# Patient Record
Sex: FEMALE | Race: WHITE | NOT HISPANIC OR LATINO | Employment: FULL TIME | URBAN - METROPOLITAN AREA
[De-identification: names, ages, dates, MRNs, and addresses within clinical notes are randomized per-mention and may not be internally consistent; named-entity substitution may affect disease eponyms.]

---

## 2017-01-23 ENCOUNTER — LAB VISIT (OUTPATIENT)
Dept: LAB | Facility: HOSPITAL | Age: 47
End: 2017-01-23
Payer: COMMERCIAL

## 2017-01-23 ENCOUNTER — OFFICE VISIT (OUTPATIENT)
Dept: FAMILY MEDICINE | Facility: CLINIC | Age: 47
End: 2017-01-23
Payer: COMMERCIAL

## 2017-01-23 VITALS
HEART RATE: 68 BPM | SYSTOLIC BLOOD PRESSURE: 140 MMHG | BODY MASS INDEX: 42.44 KG/M2 | DIASTOLIC BLOOD PRESSURE: 86 MMHG | HEIGHT: 62 IN | OXYGEN SATURATION: 99 % | WEIGHT: 230.63 LBS

## 2017-01-23 DIAGNOSIS — R22.1 NECK MASS: Primary | ICD-10-CM

## 2017-01-23 DIAGNOSIS — R22.1 NECK MASS: ICD-10-CM

## 2017-01-23 LAB
BASOPHILS # BLD AUTO: 0.04 K/UL
BASOPHILS NFR BLD: 0.6 %
DIFFERENTIAL METHOD: NORMAL
EOSINOPHIL # BLD AUTO: 0.2 K/UL
EOSINOPHIL NFR BLD: 3.2 %
ERYTHROCYTE [DISTWIDTH] IN BLOOD BY AUTOMATED COUNT: 12.9 %
HCT VFR BLD AUTO: 40.6 %
HGB BLD-MCNC: 13.6 G/DL
LYMPHOCYTES # BLD AUTO: 1.7 K/UL
LYMPHOCYTES NFR BLD: 25.1 %
MCH RBC QN AUTO: 28.3 PG
MCHC RBC AUTO-ENTMCNC: 33.5 %
MCV RBC AUTO: 84 FL
MONOCYTES # BLD AUTO: 0.5 K/UL
MONOCYTES NFR BLD: 7.6 %
NEUTROPHILS # BLD AUTO: 4.3 K/UL
NEUTROPHILS NFR BLD: 63.4 %
PLATELET # BLD AUTO: 220 K/UL
PMV BLD AUTO: 12.5 FL
RBC # BLD AUTO: 4.81 M/UL
TSH SERPL DL<=0.005 MIU/L-ACNC: 0.8 UIU/ML
WBC # BLD AUTO: 6.8 K/UL

## 2017-01-23 PROCEDURE — 84443 ASSAY THYROID STIM HORMONE: CPT

## 2017-01-23 PROCEDURE — 36415 COLL VENOUS BLD VENIPUNCTURE: CPT | Mod: PO

## 2017-01-23 PROCEDURE — 99213 OFFICE O/P EST LOW 20 MIN: CPT | Mod: S$GLB,,, | Performed by: NURSE PRACTITIONER

## 2017-01-23 PROCEDURE — 99999 PR PBB SHADOW E&M-EST. PATIENT-LVL IV: CPT | Mod: PBBFAC,,, | Performed by: NURSE PRACTITIONER

## 2017-01-23 PROCEDURE — 1159F MED LIST DOCD IN RCRD: CPT | Mod: S$GLB,,, | Performed by: NURSE PRACTITIONER

## 2017-01-23 PROCEDURE — 85025 COMPLETE CBC W/AUTO DIFF WBC: CPT

## 2017-01-23 NOTE — PROGRESS NOTES
Subjective:       Patient ID: Felicia Drew is a 46 y.o. female.    Chief Complaint: Mass (R side of neck) and Establish Care    HPI Comments: Patient has had intermittent mass on the right side of her neck, the last 3 months it has been more constant,  she feels it is getting larger, it is mildly tender.   She is under a lot a stress. She has not had any sinus congestion, fever, or weight changes.  She has been sleeping on a very hard mattress and has noticed some neck muscle pain.  Has not taken anything for symptoms.  TETANUS VACCINE due on 1987  Pap Smear due on 1990  Mammogram due on 2009  Influenza Vaccine due on 2016    Past Medical History:  Past Medical History:    Morbid obesity                                                Varicose veins                                              Past Surgical History:    LAPAROSCOPIC GASTRIC BANDING                                    SECTION                                               CHOLECYSTECTOMY                                                HYSTERECTOMY                                                   TONSILLECTOMY                                                  ABDOMINAL SURGERY                                              VASCULAR SURGERY                                               ZACK FILTER PLACEMENT                                    GASTRIC BYPASS                                                 lap band removal                                             Review of patient's allergies indicates:   -- Erythromycin -- Rash   -- Erythromycin (bulk) -- Rash  Current Outpatient Prescriptions on File Prior to Visit:  acetaminophen (TYLENOL) 500 MG tablet, Take 1 tablet (500 mg total) by mouth every 6 (six) hours as needed for Pain., Disp: , Rfl: 0  ammonium lactate 12 % Crea, daily as needed. , Disp: , Rfl: 5  B complex with C#10-folic acid 900 mcg/5 mL Liqd, Take 1 oz by mouth once daily., Disp: , Rfl:    CALCIUM CITRATE/VITAMIN D3 (CALCIUM CITRATE + D ORAL), Take by mouth 3 (three) times daily. Pt takes 1000mg tid, Disp: , Rfl:   cyanocobalamin, vitamin B-12, 2,500 mcg Subl, Place 1 tablet under the tongue once a week., Disp: , Rfl:   docusate sodium (COLACE) 100 MG capsule, Take 1 capsule (100 mg total) by mouth 2 (two) times daily., Disp: , Rfl: 0  ergocalciferol (VITAMIN D2) 50,000 unit Cap, Take 1 capsule (50,000 Units total) by mouth every 7 days., Disp: 24 capsule, Rfl: 0  metronidazole (FLAGYL) 250 MG tablet, 1 tablet 4 times daily for C diff infection, Disp: 40 tablet, Rfl: 0  omeprazole (PRILOSEC) 40 MG capsule, Take 1 capsule (40 mg total) by mouth 2 (two) times daily before meals., Disp: 180 capsule, Rfl: 1  omeprazole (PRILOSEC) 40 MG capsule, Take 1 capsule (40 mg total) by mouth 2 (two) times daily before meals., Disp: 180 capsule, Rfl: 5  ondansetron (ZOFRAN-ODT) 4 MG TbDL, TAKE 2 TABLETS BY MOUTH EVERY 6 HOURS IF NEEDED FOR NAUSEA, Disp: 60 tablet, Rfl: 1  pediatric multivitamin chewable tablet, Take 1 tablet by mouth 2 (two) times daily., Disp: , Rfl:   polyethylene glycol (GLYCOLAX) 17 gram/dose powder, Take 17 g by mouth once daily., Disp: 1 Bottle, Rfl: 0    No current facility-administered medications on file prior to visit.     Social History    Marital status:              Spouse name:                       Years of education:                 Number of children:               Occupational History    None on file    Social History Main Topics    Smoking status: Former Smoker                                                                Packs/day: 0.00      Years: 0.00           Start date: 9/9/2009    Smokeless status: Never Used                        Alcohol use: Yes                Comment: socially    Drug use: No              Sexual activity: Yes                  Other Topics            Concern    None on file    Social History Narrative    None on file      History reviewed.  No  pertinent family history.          Review of Systems   Constitutional: Negative for chills, fatigue and fever.   HENT: Negative.  Negative for postnasal drip, rhinorrhea, sneezing, sore throat and trouble swallowing.    Respiratory: Negative.  Negative for cough, chest tightness and shortness of breath.    Cardiovascular: Negative.  Negative for chest pain, palpitations and leg swelling.   Gastrointestinal: Negative.  Negative for abdominal pain, diarrhea, nausea and vomiting.   Genitourinary: Negative.  Negative for dysuria, frequency and urgency.   Musculoskeletal: Positive for neck pain and neck stiffness. Negative for arthralgias, back pain, gait problem, joint swelling and myalgias.   Skin: Negative for color change, pallor, rash and wound.   Neurological: Negative.  Negative for dizziness, light-headedness and headaches.   Hematological: Positive for adenopathy. Does not bruise/bleed easily.       Objective:      Physical Exam   Constitutional: She is oriented to person, place, and time. No distress.   HENT:   Head: Normocephalic and atraumatic.   Eyes: Pupils are equal, round, and reactive to light.   Neck: Normal range of motion.       Cardiovascular: Normal rate and regular rhythm.  Exam reveals no friction rub.    No murmur heard.  Pulmonary/Chest: Effort normal and breath sounds normal. No respiratory distress. She has no wheezes.   Abdominal: Soft. Bowel sounds are normal. She exhibits no distension. There is no tenderness.   Musculoskeletal: She exhibits no edema.   Neurological: She is alert and oriented to person, place, and time.   Skin: She is not diaphoretic. No erythema.   Psychiatric: She has a normal mood and affect.   Vitals reviewed.      Assessment:       1. Neck mass        Plan:       1. Neck mass  Likely a normal lymph node, will evaluate.   - CBC auto differential; Future  - TSH; Future  - US Soft Tissue Head Neck Thyroid; Future

## 2017-01-23 NOTE — MR AVS SNAPSHOT
[facility]  [address]  [facility] [address]  Phone: [phone]  Fax: [phone]                  Felicia Drew   2017 5:00 PM   Office Visit    Description:  Female : 1970   Provider:  Kasie Medrano NP   Department:  [facility]           Reason for Visit     Mass     Establish Care           Diagnoses this Visit        Comments    Neck mass    -  Primary            To Do List           Future Appointments        Provider Department Dept Phone    2017 4:45 PM LAB, [place] [facility] [phone]    2017 5:00 PM Kasie Medrano NP [facility] [phone]    2017 3:15 PM [facility] [phone]    2017 2:00 PM Puma Barajas MD [facility] [phone]    2017 5:40 PM Paola Grnada MD [facility] [phone]      Goals (5 Years of Data)     None      [facility]     [facility] -  Assistance  Registered nurses in the [facility] provide clinical advisement, health education, appointment booking, and other advisory services.  Call for this free service at [phone].             Medications           Message regarding Medications     Verify the changes and/or additions to your medication regime listed below are the same as discussed with your clinician today.  If any of these changes or additions are incorrect, please notify your healthcare provider.             Verify that the below list of medications is an accurate representation of the medications you are currently taking.  If none reported, the list may be blank. If incorrect, please contact your healthcare provider. Carry this list with you in case of emergency.           Current Medications     acetaminophen (TYLENOL) 500 MG tablet Take 1 tablet (500 mg total) by mouth every 6 (six) hours as needed for Pain.     "ammonium lactate 12 % Crea daily as needed.     B complex with C#10-folic acid 900 mcg/5 mL Liqd Take 1 oz by mouth once daily.    CALCIUM CITRATE/VITAMIN D3 (CALCIUM CITRATE + D ORAL) Take by mouth 3 (three) times daily. Pt takes 1000mg tid    cyanocobalamin, vitamin B-12, 2,500 mcg Subl Place 1 tablet under the tongue once a week.    docusate sodium (COLACE) 100 MG capsule Take 1 capsule (100 mg total) by mouth 2 (two) times daily.    ergocalciferol (VITAMIN D2) 50,000 unit Cap Take 1 capsule (50,000 Units total) by mouth every 7 days.    metronidazole (FLAGYL) 250 MG tablet 1 tablet 4 times daily for C diff infection    omeprazole (PRILOSEC) 40 MG capsule Take 1 capsule (40 mg total) by mouth 2 (two) times daily before meals.    omeprazole (PRILOSEC) 40 MG capsule Take 1 capsule (40 mg total) by mouth 2 (two) times daily before meals.    ondansetron (ZOFRAN-ODT) 4 MG TbDL TAKE 2 TABLETS BY MOUTH EVERY 6 HOURS IF NEEDED FOR NAUSEA    pediatric multivitamin chewable tablet Take 1 tablet by mouth 2 (two) times daily.    polyethylene glycol (GLYCOLAX) 17 gram/dose powder Take 17 g by mouth once daily.           Clinical Reference Information           Vital Signs - Last Recorded  Most recent update: 1/23/2017  3:54 PM by Brittaney Albert LPN    BP Pulse Ht Wt SpO2 BMI    (!) 140/86 68 5' 2" (1.575 m) 104.6 kg (230 lb 9.6 oz) 99% 42.18 kg/m2      Blood Pressure          Most Recent Value    BP  (!)  140/86      Allergies as of 1/23/2017     Erythromycin    Erythromycin (Bulk)      Immunizations Administered on Date of Encounter - 1/23/2017     None      Orders Placed During Today's Visit     Future Labs/Procedures Expected by Expires    CBC auto differential  1/23/2017 3/24/2018    TSH  1/23/2017 3/24/2018    US Soft Tissue Head Neck Thyroid  1/23/2017 1/23/2018      "

## 2017-01-25 ENCOUNTER — HOSPITAL ENCOUNTER (OUTPATIENT)
Dept: RADIOLOGY | Facility: HOSPITAL | Age: 47
Discharge: HOME | End: 2017-01-25
Attending: NURSE PRACTITIONER
Payer: COMMERCIAL

## 2017-01-25 DIAGNOSIS — R22.1 NECK MASS: ICD-10-CM

## 2017-01-25 PROCEDURE — 76536 US EXAM OF HEAD AND NECK: CPT | Mod: 26,,, | Performed by: RADIOLOGY

## 2017-01-25 PROCEDURE — 76536 US EXAM OF HEAD AND NECK: CPT | Mod: TC,PO

## 2017-01-26 DIAGNOSIS — R22.1 NECK NODULE: Primary | ICD-10-CM

## 2017-01-27 ENCOUNTER — HOSPITAL ENCOUNTER (OUTPATIENT)
Dept: RADIOLOGY | Facility: HOSPITAL | Age: 47
Discharge: HOME | End: 2017-01-27
Attending: NURSE PRACTITIONER
Payer: COMMERCIAL

## 2017-01-27 DIAGNOSIS — R22.1 NECK NODULE: ICD-10-CM

## 2017-01-27 PROCEDURE — 70491 CT SOFT TISSUE NECK W/DYE: CPT | Mod: TC,PO

## 2017-01-27 PROCEDURE — 25500020 PHARM REV CODE 255: Mod: PO | Performed by: NURSE PRACTITIONER

## 2017-01-27 PROCEDURE — 70491 CT SOFT TISSUE NECK W/DYE: CPT | Mod: 26,,, | Performed by: RADIOLOGY

## 2017-01-27 RX ADMIN — IOHEXOL 75 ML: 350 INJECTION, SOLUTION INTRAVENOUS at 02:01

## 2017-02-02 ENCOUNTER — HOSPITAL ENCOUNTER (OUTPATIENT)
Dept: RADIOLOGY | Facility: CLINIC | Age: 47
Discharge: HOME | End: 2017-02-02
Attending: OBSTETRICS & GYNECOLOGY
Payer: COMMERCIAL

## 2017-02-02 ENCOUNTER — OFFICE VISIT (OUTPATIENT)
Dept: OBSTETRICS AND GYNECOLOGY | Facility: CLINIC | Age: 47
End: 2017-02-02
Payer: COMMERCIAL

## 2017-02-02 VITALS
DIASTOLIC BLOOD PRESSURE: 80 MMHG | WEIGHT: 235 LBS | SYSTOLIC BLOOD PRESSURE: 134 MMHG | HEIGHT: 62 IN | BODY MASS INDEX: 43.24 KG/M2

## 2017-02-02 DIAGNOSIS — Z12.72 PAP SMEAR OF VAGINA FOLLOWING GYNECOLOGIC SURGERY: Primary | ICD-10-CM

## 2017-02-02 DIAGNOSIS — Z90.710 H/O HYSTERECTOMY FOR BENIGN DISEASE: ICD-10-CM

## 2017-02-02 DIAGNOSIS — Z12.31 OTHER SCREENING MAMMOGRAM: ICD-10-CM

## 2017-02-02 PROCEDURE — 77063 BREAST TOMOSYNTHESIS BI: CPT | Mod: 26,,, | Performed by: RADIOLOGY

## 2017-02-02 PROCEDURE — 99386 PREV VISIT NEW AGE 40-64: CPT | Mod: S$GLB,,, | Performed by: OBSTETRICS & GYNECOLOGY

## 2017-02-02 PROCEDURE — 77067 SCR MAMMO BI INCL CAD: CPT | Mod: 26,,, | Performed by: RADIOLOGY

## 2017-02-02 PROCEDURE — 99999 PR PBB SHADOW E&M-EST. PATIENT-LVL III: CPT | Mod: PBBFAC,,, | Performed by: OBSTETRICS & GYNECOLOGY

## 2017-02-02 PROCEDURE — 77067 SCR MAMMO BI INCL CAD: CPT | Mod: TC

## 2017-02-02 PROCEDURE — 88175 CYTOPATH C/V AUTO FLUID REDO: CPT

## 2017-02-02 RX ORDER — NYSTATIN 100000 [USP'U]/G
POWDER TOPICAL
Qty: 60 G | Refills: 0 | Status: SHIPPED | OUTPATIENT
Start: 2017-02-02 | End: 2018-01-29 | Stop reason: SDUPTHER

## 2017-02-02 NOTE — PROGRESS NOTES
Chief Complaint   Patient presents with    new patient    Annual Exam       History and Physical:  No LMP recorded. Patient has had a hysterectomy.       Felicia Drew is a 46 y.o.  female who presents today for her routine annual GYN exam. The patient has no Gynecology complaints today. No bowel or bladder complaints. Reports rash with exercise, counseled.       Allergies:   Review of patient's allergies indicates:   Allergen Reactions    Erythromycin Rash    Erythromycin (bulk) Rash       Past Medical History   Diagnosis Date    Morbid obesity     Varicose veins        Past Surgical History   Procedure Laterality Date    Laparoscopic gastric banding       section      Cholecystectomy      Hysterectomy      Tonsillectomy      Abdominal surgery      Vascular surgery      Jorge A filter placement      Gastric bypass      Lap band removal          MEDS:   Current Outpatient Prescriptions on File Prior to Visit   Medication Sig Dispense Refill    acetaminophen (TYLENOL) 500 MG tablet Take 1 tablet (500 mg total) by mouth every 6 (six) hours as needed for Pain.  0    ammonium lactate 12 % Crea daily as needed.   5    B complex with C#10-folic acid 900 mcg/5 mL Liqd Take 1 oz by mouth once daily.      CALCIUM CITRATE/VITAMIN D3 (CALCIUM CITRATE + D ORAL) Take by mouth 3 (three) times daily. Pt takes 1000mg tid      cyanocobalamin, vitamin B-12, 2,500 mcg Subl Place 1 tablet under the tongue once a week.      docusate sodium (COLACE) 100 MG capsule Take 1 capsule (100 mg total) by mouth 2 (two) times daily.  0    ergocalciferol (VITAMIN D2) 50,000 unit Cap Take 1 capsule (50,000 Units total) by mouth every 7 days. 24 capsule 0    ondansetron (ZOFRAN-ODT) 4 MG TbDL TAKE 2 TABLETS BY MOUTH EVERY 6 HOURS IF NEEDED FOR NAUSEA 60 tablet 1    pediatric multivitamin chewable tablet Take 1 tablet by mouth 2 (two) times daily.      polyethylene glycol (GLYCOLAX) 17 gram/dose  powder Take 17 g by mouth once daily. 1 Bottle 0    [DISCONTINUED] omeprazole (PRILOSEC) 40 MG capsule Take 1 capsule (40 mg total) by mouth 2 (two) times daily before meals. 180 capsule 1    [DISCONTINUED] metronidazole (FLAGYL) 250 MG tablet 1 tablet 4 times daily for C diff infection 40 tablet 0    [DISCONTINUED] omeprazole (PRILOSEC) 40 MG capsule Take 1 capsule (40 mg total) by mouth 2 (two) times daily before meals. 180 capsule 5     No current facility-administered medications on file prior to visit.        OB History      Para Term  AB TAB SAB Ectopic Multiple Living    2 2                  Social History     Social History    Marital status:      Spouse name: N/A    Number of children: N/A    Years of education: N/A     Occupational History    Not on file.     Social History Main Topics    Smoking status: Former Smoker     Start date: 2009    Smokeless tobacco: Never Used    Alcohol use Yes      Comment: socially    Drug use: No    Sexual activity: Yes     Other Topics Concern    Not on file     Social History Narrative       History reviewed. No pertinent family history.      Past medical and surgical history reviewed.   I have reviewed the patient's medical history in detail and updated the computerized patient record.        Review of System:   General: no chills, fever, night sweats, weight gain or weight loss  Psychological: no depression or suicidal ideation  Breasts: no new or changing breast lumps, nipple discharge or masses.  Respiratory: no cough, shortness of breath, or wheezing  Cardiovascular: no chest pain or dyspnea on exertion  Gastrointestinal: no abdominal pain, change in bowel habits, or black or bloody stools  Genito-Urinary: no incontinence, urinary frequency/urgency or vulvar/vaginal symptoms, pelvic pain or abnormal vaginal bleeding.  Musculoskeletal: no gait disturbance or muscular weakness      Physical Exam:     Visit Vitals    /80     "Ht 5' 2" (1.575 m)    Wt 106.6 kg (235 lb 0.2 oz)    BMI 42.98 kg/m2     Constitutional: She is oriented to person, place, and time. She appears well-developed and well-nourished. No distress.   HENT:   Head: Normocephalic and atraumatic.   Eyes: Conjunctivae and EOM are normal. No scleral icterus.   Neck: Normal range of motion. Neck supple. No tracheal deviation present.   Cardiovascular: Normal rate.    Pulmonary/Chest: Effort normal. No respiratory distress. She exhibits no tenderness.  Breasts: are symmetrical.   Right breast exhibits no inverted nipple, no mass, no nipple discharge, no skin change and no tenderness.   Left breast exhibits no inverted nipple, no mass, no nipple discharge, no skin change and no tenderness.  Abdominal: Soft. She exhibits no distension and no mass. There is no tenderness. There is no rebound and no guarding.   Genitourinary:    External rectal exam shows no thrombosed external hemorrhoids.    Pelvic exam was performed with patient supine.   No labial fusion.   There is no rash, lesion or injury on the right labia.   There is no rash, lesion or injury on the left labia.   No bleeding and no signs of injury around the vaginal introitus, urethra is without lesions and well supported.    No vaginal discharge found.   No significant Cystocele, Enterocele or rectocele, and cuff well supported.   Bimanual exam:   The urethra and vagina are without palpable masses or tenderness.   Uterus and cervix are surgically absents, vaginal cuff is intact and well supported.   Right adnexum displays no mass and no tenderness.   Left adnexum displays no mass and no tenderness.  Musculoskeletal: Normal range of motion.   Lymphadenopathy: No inguinal adenopathy present.   Neurological: She is alert and oriented to person, place, and time. Coordination normal.   Skin: Skin is warm and dry. She is not diaphoretic.   Psychiatric: She has a normal mood and affect.        Assessment:   Normal annual GYN " exam  1. Pap smear of vagina following gynecologic surgery  Liquid-based pap smear, screening   2. Other screening mammogram  CANCELED: Mammo Digital Screening Bilat With CAD   tinea - use nystatin as needed    Plan:   PAP  Mammogram  Follow up in 1 year.

## 2017-02-02 NOTE — MR AVS SNAPSHOT
[facility]  [address]  [facility] [address]  Phone: [phone]                  Felicia Drew   2017 2:00 PM   Office Visit    Description:  Female : 1970   Provider:  Puma Barajas MD   Department:  [facility]           Reason for Visit     new patient     Annual Exam           Diagnoses this Visit        Comments    Other screening mammogram    -  Primary            To Do List           Future Appointments        Provider Department Dept Phone    2017 5:40 PM Paola Granda MD [facility] [phone]      Goals (5 Years of Data)     None      [facility]     [facility] -  Assistance  Registered nurses in the [facility] Center provide clinical advisement, health education, appointment booking, and other advisory services.  Call for this free service at [phone].             Medications           Message regarding Medications     Verify the changes and/or additions to your medication regime listed below are the same as discussed with your clinician today.  If any of these changes or additions are incorrect, please notify your healthcare provider.        STOP taking these medications     metronidazole (FLAGYL) 250 MG tablet 1 tablet 4 times daily for C diff infection    omeprazole (PRILOSEC) 40 MG capsule Take 1 capsule (40 mg total) by mouth 2 (two) times daily before meals.    omeprazole (PRILOSEC) 40 MG capsule Take 1 capsule (40 mg total) by mouth 2 (two) times daily before meals.           Verify that the below list of medications is an accurate representation of the medications you are currently taking.  If none reported, the list may be blank. If incorrect, please contact your healthcare provider. Carry this list with you in case of emergency.           Current Medications     acetaminophen (TYLENOL) 500 MG tablet Take 1 tablet (500 mg total) by mouth every 6 (six) hours as needed  "for Pain.    ammonium lactate 12 % Crea daily as needed.     B complex with C#10-folic acid 900 mcg/5 mL Liqd Take 1 oz by mouth once daily.    CALCIUM CITRATE/VITAMIN D3 (CALCIUM CITRATE + D ORAL) Take by mouth 3 (three) times daily. Pt takes 1000mg tid    cyanocobalamin, vitamin B-12, 2,500 mcg Subl Place 1 tablet under the tongue once a week.    docusate sodium (COLACE) 100 MG capsule Take 1 capsule (100 mg total) by mouth 2 (two) times daily.    ergocalciferol (VITAMIN D2) 50,000 unit Cap Take 1 capsule (50,000 Units total) by mouth every 7 days.    ondansetron (ZOFRAN-ODT) 4 MG TbDL TAKE 2 TABLETS BY MOUTH EVERY 6 HOURS IF NEEDED FOR NAUSEA    pediatric multivitamin chewable tablet Take 1 tablet by mouth 2 (two) times daily.    polyethylene glycol (GLYCOLAX) 17 gram/dose powder Take 17 g by mouth once daily.           Clinical Reference Information           Your Vitals Were     BP Height Weight BMI       134/80 5' 2" (1.575 m) 106.6 kg (235 lb 0.2 oz) 42.98 kg/m2       Blood Pressure          Most Recent Value    BP  134/80      Allergies as of 2/2/2017     Erythromycin    Erythromycin (Bulk)      Immunizations Administered on Date of Encounter - 2/2/2017     None      Orders Placed During Today's Visit     Future Labs/Procedures Expected by Expires    Mammo Digital Screening Bilat With CAD  2/2/2017 4/2/2018      Language Assistance Services     ATTENTION: Language assistance services are available, free of charge. Please call [phone].      ATENCIÓN: Si dannie jeong, tiene a foster disposición servicios gratuitos de asistencia lingüística. Llame al [phone].     [facility] Ý: N?u b?n nói Ti?ng Vi?t, có các d?ch v? h? tr? ngôn ng? mi?n phí dành cho b?n. G?i s? [phone].         [facility] complies with applicable Federal civil rights laws and does not discriminate on the basis of race, color, national origin, age, disability, or sex.        "

## 2017-04-05 ENCOUNTER — OFFICE VISIT (OUTPATIENT)
Dept: INTERNAL MEDICINE | Facility: CLINIC | Age: 47
End: 2017-04-05
Payer: COMMERCIAL

## 2017-04-05 ENCOUNTER — HOSPITAL ENCOUNTER (OUTPATIENT)
Dept: RADIOLOGY | Facility: HOSPITAL | Age: 47
Discharge: HOME | End: 2017-04-05
Attending: INTERNAL MEDICINE
Payer: COMMERCIAL

## 2017-04-05 VITALS
HEART RATE: 62 BPM | RESPIRATION RATE: 18 BRPM | DIASTOLIC BLOOD PRESSURE: 80 MMHG | BODY MASS INDEX: 43.41 KG/M2 | SYSTOLIC BLOOD PRESSURE: 126 MMHG | OXYGEN SATURATION: 99 % | WEIGHT: 235.88 LBS | HEIGHT: 62 IN

## 2017-04-05 DIAGNOSIS — E55.9 VITAMIN D DEFICIENCY: ICD-10-CM

## 2017-04-05 DIAGNOSIS — E66.01 MORBID OBESITY WITH BMI OF 40.0-44.9, ADULT: ICD-10-CM

## 2017-04-05 DIAGNOSIS — Z82.49 FAMILY HISTORY OF ABDOMINAL AORTIC ANEURYSM (AAA): Primary | ICD-10-CM

## 2017-04-05 DIAGNOSIS — M54.2 CERVICALGIA: ICD-10-CM

## 2017-04-05 DIAGNOSIS — E53.8 VITAMIN B12 DEFICIENCY: ICD-10-CM

## 2017-04-05 DIAGNOSIS — Z00.00 HEALTH CARE MAINTENANCE: ICD-10-CM

## 2017-04-05 DIAGNOSIS — E61.1 IRON DEFICIENCY: ICD-10-CM

## 2017-04-05 PROCEDURE — 99214 OFFICE O/P EST MOD 30 MIN: CPT | Mod: S$GLB,,, | Performed by: INTERNAL MEDICINE

## 2017-04-05 PROCEDURE — 72050 X-RAY EXAM NECK SPINE 4/5VWS: CPT | Mod: TC,PO

## 2017-04-05 PROCEDURE — 72050 X-RAY EXAM NECK SPINE 4/5VWS: CPT | Mod: 26,,, | Performed by: RADIOLOGY

## 2017-04-05 PROCEDURE — 1160F RVW MEDS BY RX/DR IN RCRD: CPT | Mod: S$GLB,,, | Performed by: INTERNAL MEDICINE

## 2017-04-05 PROCEDURE — 99999 PR PBB SHADOW E&M-EST. PATIENT-LVL III: CPT | Mod: PBBFAC,,, | Performed by: INTERNAL MEDICINE

## 2017-04-05 RX ORDER — ERGOCALCIFEROL 1.25 MG/1
CAPSULE ORAL
COMMUNITY
Start: 2016-12-22 | End: 2017-05-31 | Stop reason: SDUPTHER

## 2017-04-05 RX ORDER — DIAPER,BRIEF,INFANT-TODD,DISP
EACH MISCELLANEOUS
COMMUNITY
End: 2017-04-05

## 2017-04-05 RX ORDER — OMEPRAZOLE 40 MG/1
CAPSULE, DELAYED RELEASE ORAL
COMMUNITY
Start: 2016-12-22 | End: 2017-05-31 | Stop reason: SDUPTHER

## 2017-04-05 RX ORDER — ONDANSETRON 4 MG/1
TABLET, ORALLY DISINTEGRATING ORAL
COMMUNITY
Start: 2016-12-22 | End: 2017-04-05

## 2017-04-05 RX ORDER — VITAMIN B COMPLEX
1 TABLET ORAL
COMMUNITY
End: 2017-04-05 | Stop reason: SDUPTHER

## 2017-04-05 NOTE — MR AVS SNAPSHOT
[facility]  [address] [facility] [address]  [facility] [address]  Phone: [phone]  Fax: [phone]                  Felicia Drew   2017 5:40 PM   Office Visit    Description:  Female : 1970   Provider:  Paola Granda MD   Department:  [facility]           Reason for Visit     Establish Care           Diagnoses this Visit        Comments    Family history of abdominal aortic aneurysm (AAA)    -  Primary     Cervicalgia         Health care maintenance         Vitamin D deficiency         Vitamin B12 deficiency         Iron deficiency                To Do List           Future Appointments        Provider Department Dept Phone    2017 9:30 AM HMDC US1 [facility] [phone]    2017 10:55 AM LABORATORY, [place] [facility] [phone]    10/5/2017 4:40 PM Paola Granda MD [facility] [phone]      Goals (5 Years of Data)     None      Follow-Up and Disposition     Return in about 6 months (around 10/5/2017).      [facility]     [facility] Nurse Care [facility] -  Assistance  Unless otherwise directed by your provider, please contact [facility], our nurse care line that is available for  assistance.     Registered nurses in the [facility] provide: appointment scheduling, clinical advisement, health education, and other advisory services.  Call: [phone] (toll free)               Medications           Message regarding Medications     Verify the changes and/or additions to your medication regime listed below are the same as discussed with your clinician today.  If any of these changes or additions are incorrect, please notify your healthcare provider.        STOP taking these medications     ammonium lactate 12 % Crea daily as needed.     calcium citrate-vitamin D3 250 mg calcium- 200 unit Tab Take by mouth.           Verify that the below  "list of medications is an accurate representation of the medications you are currently taking.  If none reported, the list may be blank. If incorrect, please contact your healthcare provider. Carry this list with you in case of emergency.           Current Medications     acetaminophen (TYLENOL) 500 MG tablet Take 1 tablet (500 mg total) by mouth every 6 (six) hours as needed for Pain.    B complex with C#10-folic acid 900 mcg/5 mL Liqd Take 1 oz by mouth once daily.    CALCIUM CITRATE/VITAMIN D3 (CALCIUM CITRATE + D ORAL) Take by mouth 3 (three) times daily. Pt takes 1000mg tid    cyanocobalamin, vitamin B-12, 2,500 mcg Subl Place 1 tablet under the tongue once a week.    docusate sodium (COLACE) 100 MG capsule Take 1 capsule (100 mg total) by mouth 2 (two) times daily.    ergocalciferol (ERGOCALCIFEROL) 50,000 unit Cap TAKE 1 CAPSULE (50,000 UNITS TOTAL) BY MOUTH EVERY 7 DAYS.    omeprazole (PRILOSEC) 40 MG capsule TAKE 1 CAPSULE BY MOUTH 2 TIMES DAILY BEFORE MEALS.    ondansetron (ZOFRAN-ODT) 4 MG TbDL TAKE 2 TABLETS BY MOUTH EVERY 6 HOURS IF NEEDED FOR NAUSEA    pediatric multivitamin chewable tablet Take 1 tablet by mouth 2 (two) times daily.    polyethylene glycol (GLYCOLAX) 17 gram/dose powder Take 17 g by mouth once daily.    nystatin (MYCOSTATIN) powder Apply to affected area 3 times daily           Clinical Reference Information           Your Vitals Were     BP Pulse Resp Height Weight SpO2    126/80 62 18 5' 2" (1.575 m) 107 kg (235 lb 14.3 oz) 99%    BMI                43.15 kg/m2          Blood Pressure          Most Recent Value    BP  126/80      Allergies as of 4/5/2017     Erythromycin    Erythromycin (Bulk)      Immunizations Administered on Date of Encounter - 4/5/2017     None      Orders Placed During Today's Visit     Future Labs/Procedures Expected by Expires    Comprehensive metabolic panel  4/5/2017 7/4/2017    Ferritin  4/5/2017 4/5/2018    Lipid panel  4/5/2017 6/4/2018    US Abdominal " Aorta  4/5/2017 4/5/2018    Vitamin B12  4/5/2017 4/5/2018    Vitamin D  4/5/2017 7/4/2017    X-Ray Cervical Spine Complete 5 view  4/5/2017 4/5/2018      Language Assistance Services     ATTENTION: Language assistance services are available, free of charge. Please call [phone].      ATENCIÓN: Si habla español, tiene a foster disposición servicios gratuitos de asistencia lingüística. Llame al [phone].     CHÚ Ý: N?u b?n nói Ti?ng Vi?t, có các d?ch v? h? tr? ngôn ng? mi?n phí dành cho b?n. G?i s? [phone].         [facility] complies with applicable Federal civil rights laws and does not discriminate on the basis of race, color, national origin, age, disability, or sex.

## 2017-04-05 NOTE — PROGRESS NOTES
"HISTORY OF PRESENT ILLNESS:  Pt. is a 46 y.o. female presents to establish care from Dr. Burciaga, last seen by him in 2015.  In the interim, has had s/p Band to Sleeve with Dr. Manriquez on 4/9/15.  Has been followed by bariatrics since.  She had also seen the NP after "swelling to her neck", CT and U/S performed, felt to be a lymph node.  Is due for lipid, CMP.  She is still having pain to her right posterior neck.  For a year has had problems focusing.  She will ask  is any apnea.  Rare dreaming.  Her aunt just passed away from stomach cancer.  Her mother and brother also had AAAs.     Lab Results   Component Value Date    WBC 6.80 01/23/2017    HGB 13.6 01/23/2017    HCT 40.6 01/23/2017     01/23/2017    CHOL 161 05/06/2016    TRIG 70 05/06/2016    HDL 60 05/06/2016    ALT 16 05/06/2016    AST 15 05/06/2016     05/06/2016    K 3.7 05/06/2016     05/06/2016    CREATININE 0.7 05/06/2016    BUN 17 05/06/2016    CO2 30 (H) 05/06/2016    TSH 0.803 01/23/2017     Lab Results   Component Value Date    LDLCALC 87.0 05/06/2016             ROS:  GENERAL: No fever, chills, occ fatigability; stable weight loss.  SKIN: No rashes, itching or changes in color or texture of skin.  HEAD: No headaches or recent head trauma.  EARS: Denies ear pain, discharge or vertigo.  NOSE: No loss of smell, no epistaxis or postnasal drip.  MOUTH & THROAT: No hoarseness or change in voice. No excessive gum bleeding.  NODES: Denies swollen glands.  CHEST: Denies DONAHUE, cyanosis, wheezing, cough and sputum production.  CARDIOVASCULAR: Denies chest pain, PND, orthopnea or reduced exercise tolerance.  ABDOMEN: Appetite fine. No weight loss. Denies constipation, diarrhea, abdominal pain, hematemesis or blood in stool.  URINARY: No flank pain, dysuria or hematuria.  PERIPHERAL VASCULAR: No claudication or cyanosis. No edema.  MUSCULOSKELETAL: No joint stiffness or swelling. Denies back pain; positive " cervicalgia;  NEUROLOGIC: Denies numbness    PE:   Vitals:   Vitals:    04/05/17 1718   BP: 126/80   Pulse: 62   Resp: 18     GENERAL: no acute distress, A&Ox3, comfortable.  Female with BMI of 43   HEENT: tympanic membranes clear, nasal mucosa pink, no pharyngeal erythema or exudate  NECK: supple, no cervical lymphadenopathy, no thyromegaly; no supraclavicular nodes;   CHEST:  clear to auscultation bilaterally, no crackles or wheeze; no increased work of breathing;  CARDIOVASCULAR: regular rate and rhythm, no rubs, murmurs or gallops.  ABDOMEN: normal bowel sounds, soft non-tender, non-distended; no palpable organomegaly;   EXT: no clubbing, cyanosis or edema.     ASSESSMENT/PLAN:    Family history of abdominal aortic aneurysm (AAA)  -     US Abdominal Aorta; Future; Expected date: 4/5/17    Cervicalgia  -     X-Ray Cervical Spine Complete 5 view; Future; Expected date: 4/5/17    Vitamin D deficiency  -     Vitamin D; Future; Expected date: 4/5/17    Vitamin B12 deficiency  -     Vitamin B12; Future; Expected date: 4/5/17    Iron deficiency  -     Ferritin; Future; Expected date: 4/5/17    Morbid obesity with BMI of 40.0-44.9, adult    Health care maintenance  -     Cancel: Tdap Vaccine  -     Comprehensive metabolic panel; Future; Expected date: 4/5/17  -     Lipid panel; Future; Expected date: 4/5/17    stressed need for weight loss with pt;  Call if condition changes or worsens.

## 2017-04-08 PROBLEM — Z82.49 FAMILY HISTORY OF ABDOMINAL AORTIC ANEURYSM (AAA): Status: ACTIVE | Noted: 2017-04-08

## 2017-04-08 PROBLEM — M54.2 CERVICALGIA: Status: ACTIVE | Noted: 2017-04-08

## 2017-04-08 PROBLEM — E61.1 IRON DEFICIENCY: Status: ACTIVE | Noted: 2017-04-08

## 2017-04-08 PROBLEM — E53.8 VITAMIN B12 DEFICIENCY: Status: ACTIVE | Noted: 2017-04-08

## 2017-04-08 PROBLEM — E55.9 VITAMIN D DEFICIENCY: Status: ACTIVE | Noted: 2017-04-08

## 2017-04-11 ENCOUNTER — HOSPITAL ENCOUNTER (OUTPATIENT)
Dept: RADIOLOGY | Facility: HOSPITAL | Age: 47
Discharge: HOME | End: 2017-04-11
Attending: INTERNAL MEDICINE
Payer: COMMERCIAL

## 2017-04-11 DIAGNOSIS — Z82.49 FAMILY HISTORY OF ABDOMINAL AORTIC ANEURYSM (AAA): ICD-10-CM

## 2017-04-11 PROCEDURE — 76775 US EXAM ABDO BACK WALL LIM: CPT | Mod: 26,,, | Performed by: RADIOLOGY

## 2017-04-11 PROCEDURE — 76775 US EXAM ABDO BACK WALL LIM: CPT | Mod: TC,PO

## 2017-05-10 ENCOUNTER — TELEPHONE (OUTPATIENT)
Dept: FAMILY MEDICINE | Facility: CLINIC | Age: 47
End: 2017-05-10

## 2017-05-10 DIAGNOSIS — M54.2 CERVICALGIA: Primary | ICD-10-CM

## 2017-05-10 NOTE — TELEPHONE ENCOUNTER
----- Message from Kim Peters sent at 5/10/2017 11:06 AM CDT -----  Contact: Jennifer  Returning phone call from last week regarding physical therapy. Please call back [phone]!  Thanks!

## 2017-05-31 ENCOUNTER — PATIENT MESSAGE (OUTPATIENT)
Dept: BARIATRICS | Facility: CLINIC | Age: 47
End: 2017-05-31

## 2017-05-31 DIAGNOSIS — R11.0 NAUSEA: ICD-10-CM

## 2017-05-31 RX ORDER — OMEPRAZOLE 40 MG/1
CAPSULE, DELAYED RELEASE ORAL
Qty: 30 CAPSULE | Refills: 11 | Status: SHIPPED | OUTPATIENT
Start: 2017-05-31 | End: 2020-01-15 | Stop reason: DRUGHIGH

## 2017-05-31 RX ORDER — ONDANSETRON 4 MG/1
TABLET, ORALLY DISINTEGRATING ORAL
Qty: 60 TABLET | Refills: 1 | Status: SHIPPED | OUTPATIENT
Start: 2017-05-31 | End: 2019-12-11 | Stop reason: ALTCHOICE

## 2017-05-31 RX ORDER — ERGOCALCIFEROL 1.25 MG/1
CAPSULE ORAL
Qty: 12 CAPSULE | Refills: 0 | Status: SHIPPED | OUTPATIENT
Start: 2017-05-31 | End: 2017-11-20 | Stop reason: SDUPTHER

## 2017-10-23 ENCOUNTER — OFFICE VISIT (OUTPATIENT)
Dept: FAMILY MEDICINE | Facility: CLINIC | Age: 47
End: 2017-10-23
Payer: COMMERCIAL

## 2017-10-23 VITALS
RESPIRATION RATE: 16 BRPM | WEIGHT: 257.25 LBS | DIASTOLIC BLOOD PRESSURE: 84 MMHG | HEART RATE: 68 BPM | SYSTOLIC BLOOD PRESSURE: 118 MMHG | BODY MASS INDEX: 47.34 KG/M2 | TEMPERATURE: 98 F | OXYGEN SATURATION: 97 % | HEIGHT: 62 IN

## 2017-10-23 DIAGNOSIS — M62.830 LUMBAR PARASPINAL MUSCLE SPASM: Primary | ICD-10-CM

## 2017-10-23 DIAGNOSIS — M54.41 ACUTE MIDLINE LOW BACK PAIN WITH RIGHT-SIDED SCIATICA: ICD-10-CM

## 2017-10-23 DIAGNOSIS — M54.31 SCIATICA OF RIGHT SIDE: ICD-10-CM

## 2017-10-23 PROCEDURE — 99213 OFFICE O/P EST LOW 20 MIN: CPT | Mod: S$GLB,,, | Performed by: PHYSICIAN ASSISTANT

## 2017-10-23 PROCEDURE — 99999 PR PBB SHADOW E&M-EST. PATIENT-LVL III: CPT | Mod: PBBFAC,,, | Performed by: PHYSICIAN ASSISTANT

## 2017-10-23 RX ORDER — TIZANIDINE 2 MG/1
2 TABLET ORAL EVERY 8 HOURS PRN
Qty: 30 TABLET | Refills: 0 | Status: SHIPPED | OUTPATIENT
Start: 2017-10-23 | End: 2017-11-02

## 2017-10-23 NOTE — PROGRESS NOTES
Subjective:       Patient ID: Felicia Drew is a 47 y.o. female.    Chief Complaint: Leg Pain (R leg)    HPI   Pt had recent pain down both legs  Pt now with discomfort behind the R thigh  Stiffness in lower back  Review of Systems   Constitutional: Positive for unexpected weight change. Negative for activity change, appetite change, chills, diaphoresis, fatigue and fever.   HENT: Negative.  Negative for hearing loss, rhinorrhea and trouble swallowing.    Eyes: Negative for discharge and visual disturbance.   Respiratory: Negative.  Negative for chest tightness and wheezing.    Cardiovascular: Negative.  Negative for chest pain and palpitations.   Gastrointestinal: Positive for constipation. Negative for blood in stool, diarrhea and vomiting.   Endocrine: Negative.  Negative for polydipsia and polyuria.   Genitourinary: Negative.  Negative for difficulty urinating, dysuria, hematuria and menstrual problem.   Musculoskeletal: Positive for arthralgias. Negative for joint swelling and neck pain.   Skin: Negative.  Negative for rash.   Neurological: Negative.  Negative for weakness and headaches.   Psychiatric/Behavioral: Negative.  Negative for confusion and dysphoric mood.       Objective:      Physical Exam   Constitutional: She appears well-developed and well-nourished. No distress.   HENT:   Head: Normocephalic and atraumatic.   Eyes: Conjunctivae are normal. No scleral icterus.   Neck: Normal range of motion. Neck supple. No tracheal deviation present. No thyromegaly present.   Cardiovascular: Intact distal pulses.    Musculoskeletal: She exhibits no edema.   Tight non tender lumbar paravertebral muscles  R sciatic tenderness  No vertebral tenderness on percussion     Lymphadenopathy:     She has no cervical adenopathy.   Skin: Skin is warm and dry. No rash noted. No erythema.   Varicose veins both lower ext.  No edema   Vitals reviewed.      Assessment:       1. Lumbar paraspinal muscle spasm    2. Acute  midline low back pain with right-sided sciatica    3. Sciatica of right side        Plan:       Felicia was seen today for leg pain.    Diagnoses and all orders for this visit:    Lumbar paraspinal muscle spasm  -     tizanidine (ZANAFLEX) 2 MG tablet; Take 1 tablet (2 mg total) by mouth every 8 (eight) hours as needed.    Acute midline low back pain with right-sided sciatica  -     tizanidine (ZANAFLEX) 2 MG tablet; Take 1 tablet (2 mg total) by mouth every 8 (eight) hours as needed.    Sciatica of right side    discussed otc's  Home PT  Discussed PT referral

## 2017-11-02 ENCOUNTER — LAB VISIT (OUTPATIENT)
Dept: LAB | Facility: HOSPITAL | Age: 47
End: 2017-11-02
Attending: INTERNAL MEDICINE
Payer: COMMERCIAL

## 2017-11-02 ENCOUNTER — TELEPHONE (OUTPATIENT)
Dept: FAMILY MEDICINE | Facility: CLINIC | Age: 47
End: 2017-11-02

## 2017-11-02 ENCOUNTER — OFFICE VISIT (OUTPATIENT)
Dept: INTERNAL MEDICINE | Facility: CLINIC | Age: 47
End: 2017-11-02
Payer: COMMERCIAL

## 2017-11-02 VITALS
WEIGHT: 256.81 LBS | DIASTOLIC BLOOD PRESSURE: 82 MMHG | SYSTOLIC BLOOD PRESSURE: 110 MMHG | HEART RATE: 66 BPM | BODY MASS INDEX: 47.26 KG/M2 | OXYGEN SATURATION: 98 % | HEIGHT: 62 IN

## 2017-11-02 DIAGNOSIS — M25.551 RIGHT HIP PAIN: Primary | ICD-10-CM

## 2017-11-02 DIAGNOSIS — E66.01 MORBID OBESITY: ICD-10-CM

## 2017-11-02 PROCEDURE — 84443 ASSAY THYROID STIM HORMONE: CPT

## 2017-11-02 PROCEDURE — 99999 PR PBB SHADOW E&M-EST. PATIENT-LVL III: CPT | Mod: PBBFAC,,, | Performed by: INTERNAL MEDICINE

## 2017-11-02 PROCEDURE — 36415 COLL VENOUS BLD VENIPUNCTURE: CPT | Mod: PO

## 2017-11-02 PROCEDURE — 99213 OFFICE O/P EST LOW 20 MIN: CPT | Mod: S$GLB,,, | Performed by: INTERNAL MEDICINE

## 2017-11-02 NOTE — PROGRESS NOTES
HISTORY OF PRESENT ILLNESS:  Pt. is a 47 y.o. female presents a week after seeing the PA for sciatica.  Was given tizanidine and home PT.  She is morbidly obese; is s/p Band to Sleeve with Dr. Manriquez on 4/9/15.  I last saw her 6 months ago.  She has had hysterectomy in the past.  I have reviewed her last labs, and she had elevated Vit B12 levels.  I do not see that she has had imaging of her spine.  She has gained 21 lbs in 6 months.  She states the pain is more to her lateral right leg.  She is quite tender on palpation of her right trochanter.      ROS:  GENERAL: No fever, chills, fatigability or weight loss.  SKIN: No rashes, itching or changes in color or texture of skin.  HEAD: No headaches or recent head trauma.  EARS: Denies ear pain, discharge or vertigo.  NOSE: No loss of smell, no epistaxis or postnasal drip.  MOUTH & THROAT: No hoarseness or change in voice. No excessive gum bleeding.  NODES: Denies swollen glands.  CHEST: Denies DONAHUE, cyanosis, wheezing, cough and sputum production.  CARDIOVASCULAR: Denies chest pain, PND, orthopnea or reduced exercise tolerance.  ABDOMEN: Appetite fine. No weight loss. Denies constipation, diarrhea, abdominal pain, hematemesis or blood in stool.  URINARY: No flank pain, dysuria or hematuria.  PERIPHERAL VASCULAR: No claudication or cyanosis. No edema.  MUSCULOSKELETAL: No joint stiffness or swelling. Denies back pain.  NEUROLOGIC: Denies numbness    PE:   Vitals:   Vitals:    11/02/17 1648   BP: 110/82   Pulse: 66     GENERAL: no acute distress, A&Ox3, comfortable.  Female with BMI of 46   HEENT: tympanic membranes clear, nasal mucosa pink, no pharyngeal erythema or exudate  NECK: supple, no cervical lymphadenopathy, no thyromegaly; no supraclavicular nodes;   CHEST:  clear to auscultation bilaterally, no crackles or wheeze; no increased work of breathing;  CARDIOVASCULAR: regular rate and rhythm, no rubs, murmurs or gallops.  ABDOMEN: normal bowel sounds, soft  non-tender, non-distended; no palpable organomegaly;   EXT: no clubbing, cyanosis or edema; positive pain on palpation of right trochanter;    ASSESSMENT/PLAN:    Right hip pain  -     X-Ray Hip 2 View Right; Future; Expected date: 11/02/2017  -     Ambulatory Referral to Orthopedics    Morbid obesity  -     TSH; Future; Expected date: 11/02/2017    Suggested salon pas with lidocaine for the weekend;      Call if condition changes or worsens.

## 2017-11-02 NOTE — TELEPHONE ENCOUNTER
Pt called in regards to receiving a text instructing her that there was access to an earlier appt. Pt R/S appt. From tomorrow at 9 to today at 4:40 PM. Pt voiced understanding for appt. CLC

## 2017-11-03 LAB — TSH SERPL DL<=0.005 MIU/L-ACNC: 0.86 UIU/ML

## 2017-11-04 ENCOUNTER — OFFICE VISIT (OUTPATIENT)
Dept: ORTHOPEDICS | Facility: CLINIC | Age: 47
End: 2017-11-04
Payer: COMMERCIAL

## 2017-11-04 ENCOUNTER — HOSPITAL ENCOUNTER (OUTPATIENT)
Dept: RADIOLOGY | Facility: HOSPITAL | Age: 47
Discharge: HOME | End: 2017-11-04
Attending: INTERNAL MEDICINE
Payer: COMMERCIAL

## 2017-11-04 ENCOUNTER — HOSPITAL ENCOUNTER (OUTPATIENT)
Dept: RADIOLOGY | Facility: HOSPITAL | Age: 47
Discharge: HOME | End: 2017-11-04
Attending: ORTHOPAEDIC SURGERY
Payer: COMMERCIAL

## 2017-11-04 VITALS — BODY MASS INDEX: 47.11 KG/M2 | HEIGHT: 62 IN | WEIGHT: 256 LBS

## 2017-11-04 DIAGNOSIS — M25.551 RIGHT HIP PAIN: Primary | ICD-10-CM

## 2017-11-04 DIAGNOSIS — M54.17 RADICULITIS, LUMBOSACRAL: ICD-10-CM

## 2017-11-04 DIAGNOSIS — M25.551 RIGHT HIP PAIN: ICD-10-CM

## 2017-11-04 DIAGNOSIS — M47.816 LUMBAR SPONDYLOSIS: ICD-10-CM

## 2017-11-04 PROCEDURE — 72100 X-RAY EXAM L-S SPINE 2/3 VWS: CPT | Mod: TC,PO

## 2017-11-04 PROCEDURE — 99999 PR PBB SHADOW E&M-EST. PATIENT-LVL II: CPT | Mod: PBBFAC,,, | Performed by: ORTHOPAEDIC SURGERY

## 2017-11-04 PROCEDURE — 73502 X-RAY EXAM HIP UNI 2-3 VIEWS: CPT | Mod: TC,PO,RT

## 2017-11-04 PROCEDURE — 73502 X-RAY EXAM HIP UNI 2-3 VIEWS: CPT | Mod: 26,RT,, | Performed by: RADIOLOGY

## 2017-11-04 PROCEDURE — 72100 X-RAY EXAM L-S SPINE 2/3 VWS: CPT | Mod: 26,,, | Performed by: RADIOLOGY

## 2017-11-04 PROCEDURE — 99243 OFF/OP CNSLTJ NEW/EST LOW 30: CPT | Mod: S$GLB,,, | Performed by: ORTHOPAEDIC SURGERY

## 2017-11-04 RX ORDER — METHYLPREDNISOLONE 4 MG/1
TABLET ORAL
Qty: 1 PACKAGE | Refills: 1 | Status: SHIPPED | OUTPATIENT
Start: 2017-11-04 | End: 2017-11-25

## 2017-11-04 NOTE — LETTER
November 4, 2017      Paola Granda MD  [address] [facility] [address] [place] [address]           [facility]  [address] [facility] [place] [address]  Phone: [phone]          Patient: Felicia Drew   MR Number: [id number]   YOB: 1970   Date of Visit: 11/4/2017       Dear Dr. Paola Granda:    Thank you for referring Felicia Drew to me for evaluation. Attached you will find relevant portions of my assessment and plan of care.    If you have questions, please do not hesitate to call me. I look forward to following Felicia Drew along with you.    Sincerely,    Harshad Branch MD    Enclosure  CC:  No Recipients    If you would like to receive this communication electronically, please contact [email or web address] or [phone] to request more information on [employer] access.    For providers and/or their staff who would like to refer a patient to [facility], please contact us through our one-stop-shop provider referral line, [facility], at [phone].    If you feel you have received this communication in error or would no longer like to receive these types of communications, please e-mail [email or web address]

## 2017-11-04 NOTE — PROGRESS NOTES
DATE: 2017  PATIENT: Felicia Drew  REFERRING MD: Paola Granda M.D.  CHIEF COMPLAINT:   Chief Complaint   Patient presents with    Right Hip - Pain       HISTORY:  Felicia Drew is a 47 y.o. female  who presents for initial evaluation of right hip and thigh pain.  She notes a two-week history of discomfort.  The pain started without trauma.  She notes an aching and throbbing pain which is constant.  Denies any significant back pain.  Denies any groin pain.  Pain is reported at 6/10 today.  She saw Dr. Granda's PA who thought it might be sciatica and referred her here for further evaluation and management.  She has been taking a muscle relaxer which does not help.      PAST MEDICAL/SURGICAL HISTORY:  Past Medical History:   Diagnosis Date    Morbid obesity     Varicose veins     Vitamin D deficiency      Past Surgical History:   Procedure Laterality Date    ABDOMINAL SURGERY       SECTION      CHOLECYSTECTOMY      GASTRIC BYPASS      ZACK FILTER PLACEMENT      ZACK FILTER PLACEMENT      removed ;    HYSTERECTOMY      DANNA with BSO;    lap band removal       LAPAROSCOPIC GASTRIC BANDING      TONSILLECTOMY      VASCULAR SURGERY         Current Medications:   Current Outpatient Prescriptions:     acetaminophen (TYLENOL) 500 MG tablet, Take 1 tablet (500 mg total) by mouth every 6 (six) hours as needed for Pain., Disp: , Rfl: 0    B complex with C#10-folic acid 900 mcg/5 mL Liqd, Take 1 oz by mouth once daily., Disp: , Rfl:     CALCIUM CITRATE/VITAMIN D3 (CALCIUM CITRATE + D ORAL), Take by mouth 3 (three) times daily. Pt takes 1000mg tid, Disp: , Rfl:     cyanocobalamin, vitamin B-12, 2,500 mcg Subl, Place 1 tablet under the tongue once a week., Disp: , Rfl:     docusate sodium (COLACE) 100 MG capsule, Take 1 capsule (100 mg total) by mouth 2 (two) times daily., Disp: , Rfl: 0    ergocalciferol (ERGOCALCIFEROL) 50,000 unit Cap, TAKE 1 CAPSULE (50,000  "UNITS TOTAL) BY MOUTH EVERY 7 DAYS., Disp: 12 capsule, Rfl: 0    nystatin (MYCOSTATIN) powder, Apply to affected area 3 times daily, Disp: 60 g, Rfl: 0    omeprazole (PRILOSEC) 40 MG capsule, TAKE 1 CAPSULE BY MOUTH 2 TIMES DAILY BEFORE MEALS., Disp: 30 capsule, Rfl: 11    ondansetron (ZOFRAN-ODT) 4 MG TbDL, TAKE 2 TABLETS BY MOUTH EVERY 6 HOURS IF NEEDED FOR NAUSEA, Disp: 60 tablet, Rfl: 1    pediatric multivitamin chewable tablet, Take 1 tablet by mouth 2 (two) times daily., Disp: , Rfl:     polyethylene glycol (GLYCOLAX) 17 gram/dose powder, Take 17 g by mouth once daily., Disp: 1 Bottle, Rfl: 0    Family History: Noncontributory  Social History:   Social History     Social History    Marital status:      Spouse name: N/A    Number of children: 2    Years of education: N/A     Occupational History    Not on file.     Social History Main Topics    Smoking status: Former Smoker     Start date: 9/9/2009    Smokeless tobacco: Never Used    Alcohol use Yes      Comment: socially    Drug use: No    Sexual activity: Yes     Other Topics Concern    Not on file     Social History Narrative    No narrative on file       ROS:  Constitution: Negative for chills, fever, and sweats. Negative for unexplained weight loss.  HENT: Negative for headaches and blurry vision.   Cardiovascular: Negative for chest pain, irregular heartbeat, leg swelling and palpitations.   Respiratory: Negative for cough and shortness of breath.   Gastrointestinal: Negative for abdominal pain, heartburn, nausea and vomiting.   Genitourinary: Negative for bladder incontinence and dysuria.   Musculoskeletal: Negative for systemic arthritis, muscle weakness and myalgias.   Neurological: Negative for numbness.   Psychiatric/Behavioral: Negative for depression.  Endocrine: Negative for polyuria.   Hematologic/Lymphatic: Negative for bleeding disorders.   Skin: Negative for poor wound healing.        PHYSICAL EXAM:  Ht 5' 2" (1.575 m)  "  Wt 116.1 kg (256 lb)   BMI 46.82 kg/m²   Felicia Drew is a well developed, well nourished female in no acute distress. Physical examination of the right hip and lumbar spine evaluated the following:    Gait and Alignment  Lumbar spine range of motion  Inspection for ecchymosis, swelling, atrophy, or deformity  Tenderness to palpation over the bony and soft tissue structures around the lower back/hip  Inspection for intra-articular and/or bursal effusions  Range of Motion and presence of contractures  Sensation and motor strength to the lower extremity  Pain/pop/click with logrolling or range of motion  Straight leg raise testing  Vascular exam to include skin temperature/color/capillary refill    Remarkable findings included:  Elevated BMI.  Range motion of lumbar spine is within normal limits to flexion, rotation and side bend.  Sits comfortably on exam table.  Sensation intact L2 S1.  Motor exam 5/5 quadriceps, hamstrings, tibialis anterior, gastrocnemius, extensile's on his muscles.  Reflexes symmetric in the knee and ankle jerk.  Straight leg raise testing produces mild thigh pain.              IMAGING:   X-rays of the lumbar spine and right hip are personally reviewed and compared to the radiologist's report.  Moderate degenerative changes and lumbar spine with disc space narrowing at L4-L5 noted.  Minimal degenerative changes of the hip without joint space narrowing identified.    ASSESSMENT:   Lumbar spondylosis with right lower extremity radiculitis    PLAN:  The nature of the diagnosis, using models and diagrams when appropriate, was explained to the patient in detail.Treatment option discussed included observation, physical therapy, Medrol Dosepak, MRI, and referral for lumbar cortisone injections.  As she is only had symptoms now for 2 weeks, I recommended a Medrol Dosepak.  She'll monitor her symptoms over the next 2 weeks.  She'll contact the office should she continue to remain  symptomatic.      This note was dictated using voice recognition software. Please excuse any grammatical or typographical errors.

## 2017-11-05 PROBLEM — M25.551 RIGHT HIP PAIN: Status: ACTIVE | Noted: 2017-11-05

## 2017-11-20 RX ORDER — ERGOCALCIFEROL 1.25 MG/1
CAPSULE ORAL
Qty: 12 CAPSULE | Refills: 0 | Status: SHIPPED | OUTPATIENT
Start: 2017-11-20 | End: 2019-01-02 | Stop reason: SDUPTHER

## 2017-12-04 ENCOUNTER — OFFICE VISIT (OUTPATIENT)
Dept: PAIN MEDICINE | Facility: CLINIC | Age: 47
End: 2017-12-04
Attending: ANESTHESIOLOGY
Payer: COMMERCIAL

## 2017-12-04 VITALS
DIASTOLIC BLOOD PRESSURE: 81 MMHG | SYSTOLIC BLOOD PRESSURE: 137 MMHG | BODY MASS INDEX: 46.61 KG/M2 | HEART RATE: 65 BPM | WEIGHT: 254.88 LBS

## 2017-12-04 DIAGNOSIS — M70.62 GREATER TROCHANTERIC BURSITIS OF BOTH HIPS: Primary | ICD-10-CM

## 2017-12-04 DIAGNOSIS — M70.61 GREATER TROCHANTERIC BURSITIS OF BOTH HIPS: Primary | ICD-10-CM

## 2017-12-04 DIAGNOSIS — M47.816 LUMBAR SPONDYLOSIS: ICD-10-CM

## 2017-12-04 PROCEDURE — 99204 OFFICE O/P NEW MOD 45 MIN: CPT | Mod: 25,S$GLB,, | Performed by: ANESTHESIOLOGY

## 2017-12-04 PROCEDURE — 20610 DRAIN/INJ JOINT/BURSA W/O US: CPT | Mod: RT,S$GLB,, | Performed by: ANESTHESIOLOGY

## 2017-12-04 RX ORDER — TRIAMCINOLONE ACETONIDE 40 MG/ML
40 INJECTION, SUSPENSION INTRA-ARTICULAR; INTRAMUSCULAR
Status: COMPLETED | OUTPATIENT
Start: 2017-12-04 | End: 2017-12-04

## 2017-12-04 RX ORDER — BUPIVACAINE HYDROCHLORIDE 2.5 MG/ML
6 INJECTION, SOLUTION EPIDURAL; INFILTRATION; INTRACAUDAL ONCE
Status: COMPLETED | OUTPATIENT
Start: 2017-12-04 | End: 2017-12-04

## 2017-12-04 RX ORDER — TIZANIDINE HYDROCHLORIDE 2 MG/1
2 CAPSULE, GELATIN COATED ORAL 3 TIMES DAILY PRN
COMMUNITY
Start: 2017-10-23 | End: 2018-12-18

## 2017-12-04 RX ADMIN — TRIAMCINOLONE ACETONIDE 40 MG: 40 INJECTION, SUSPENSION INTRA-ARTICULAR; INTRAMUSCULAR at 10:12

## 2017-12-04 RX ADMIN — BUPIVACAINE HYDROCHLORIDE 15 MG: 2.5 INJECTION, SOLUTION EPIDURAL; INFILTRATION; INTRACAUDAL at 10:12

## 2017-12-04 NOTE — PROGRESS NOTES
Chronic Pain - New Consult    Referring Physician: Jennifer Reynoso    Chief Complaint:   Chief Complaint   Patient presents with    Hip Pain        SUBJECTIVE:    Felicia Drew presents to the clinic for the evaluation of right hip pain. The pain started gradually approximately about 6 weeks ago. The pain was getting better until she fell in [employer] landing on her right hip about 2 weeks ago. The pain is located in the bilateral hip area (R>L) and radiates into the lateral aspect of both legs stopping at the knee.  The pain is described as pulsating and throbbing and is rated as 3/10. The pain is rated with a score of  1/10 on the BEST day and a score of 8/10 on the WORST day.  Symptoms interfere with daily activity and sleeping. The pain is exacerbated by sitting for a long period of time and lying down on the affected side.  The pain is mitigated by heat and standing. She has tried Medrol dose pack, Tizanidine, and Tylenol with no improvement. The patient reports 6-7 hours of uninterrupted sleep per night.    Patient denies urinary incontinence, bowel incontinence, significant weight loss, significant motor weakness and loss of sensations.    Physical Therapy/Home Exercise: no      Pain Disability Index Review:  Last 3 PDI Scores 12/4/2017   Pain Disability Index (PDI) 36       Pain Medications:    - Tylenol 500 mg   - Zanaflex 2 mg TID     report:  Reviewed    Imaging:     Lumbar X-ray (11/4/2017):    There is multilevel degenerative change of the lumbar spine in the form of marginal osteophyte formation and facet arthropathy.  There is disc space narrowing present at L4-L5..  No acute lumbar compression fracture.  No spondylolisthesis.  No pars defects.  The sacroiliac joints are unremarkable.    Right Hip X-ray (11/4/2017):    There is advanced degenerative change of the lower lumbar spine.  The sacroiliac joints are unremarkable.  There is, at most, mild superomedial left hip joint space  narrowing.  No radiographically evident osteonecrosis of the femoral heads on the provided images.  No radiographically evident acute fracture or osseous destructive process involving the bony pelvis or proximal appendicular skeleton.    Past Medical History:   Diagnosis Date    Morbid obesity     Varicose veins     Vitamin D deficiency      Past Surgical History:   Procedure Laterality Date    ABDOMINAL SURGERY       SECTION      CHOLECYSTECTOMY      GASTRIC BYPASS      ZACK FILTER PLACEMENT      ZACK FILTER PLACEMENT      removed ;    HYSTERECTOMY      DANNA with BSO;    lap band removal       LAPAROSCOPIC GASTRIC BANDING      TONSILLECTOMY      VASCULAR SURGERY       Social History     Social History    Marital status:      Spouse name: N/A    Number of children: 2    Years of education: N/A     Occupational History    Not on file.     Social History Main Topics    Smoking status: Former Smoker     Start date: 2009    Smokeless tobacco: Never Used    Alcohol use Yes      Comment: socially    Drug use: No    Sexual activity: Yes     Other Topics Concern    Not on file     Social History Narrative    No narrative on file     Family History   Problem Relation Age of Onset    Cancer Father      lung cancer/quit tobacco earlier;    Cancer Brother      melanoma;    COPD Brother      emphysema; quit tobacco;    Alzheimer's disease Brother     Drug abuse Brother     BAYLEE disease Sister     Hyperlipidemia Sister     Hypertension Sister     Drug abuse Brother        Review of patient's allergies indicates:   Allergen Reactions    Erythromycin Rash    Erythromycin (bulk) Rash       Current Outpatient Prescriptions   Medication Sig    acetaminophen (TYLENOL) 500 MG tablet Take 1 tablet (500 mg total) by mouth every 6 (six) hours as needed for Pain.    B complex with C#10-folic acid 900 mcg/5 mL Liqd Take 1 oz by mouth once daily.    CALCIUM  CITRATE/VITAMIN D3 (CALCIUM CITRATE + D ORAL) Take by mouth 3 (three) times daily. Pt takes 1000mg tid    cyanocobalamin, vitamin B-12, 2,500 mcg Subl Place 1 tablet under the tongue once a week.    docusate sodium (COLACE) 100 MG capsule Take 1 capsule (100 mg total) by mouth 2 (two) times daily.    ergocalciferol (ERGOCALCIFEROL) 50,000 unit Cap TAKE 1 CAPSULE (50,000 UNITS TOTAL) BY MOUTH EVERY 7 DAYS.    nystatin (MYCOSTATIN) powder Apply to affected area 3 times daily    omeprazole (PRILOSEC) 40 MG capsule TAKE 1 CAPSULE BY MOUTH 2 TIMES DAILY BEFORE MEALS.    ondansetron (ZOFRAN-ODT) 4 MG TbDL TAKE 2 TABLETS BY MOUTH EVERY 6 HOURS IF NEEDED FOR NAUSEA    pediatric multivitamin chewable tablet Take 1 tablet by mouth 2 (two) times daily.    polyethylene glycol (GLYCOLAX) 17 gram/dose powder Take 17 g by mouth once daily.    tiZANidine 2 mg Cap Take 2 mg by mouth 3 (three) times daily as needed.     No current facility-administered medications for this visit.        REVIEW OF SYSTEMS:  GENERAL: No weight loss, malaise or fevers.  HEENT: Negative for frequent or significant headaches.  NECK: Negative for lumps or significant neck swelling.  RESPIRATORY: Negative for wheezing or shortness of breath.  CARDIOVASCULAR: Negative for chest pain or palpitations.  GI: No blood in stools or black stools or change in bowel habits.  : Negative for kidney stones, urinary tract infections, or incontinence.  MUSCULOSKELETAL: See HPI  SKIN: Negative for rash or itching.  PSYCH: + sleep disturbance  HEMATOLOGY/LYMPHOLOGY Negative for prolonged bleeding, bruising easily or swollen nodes.  NEURO:  No history of syncope, seizures or tremors.    OBJECTIVE:    /81 (BP Location: Left arm, Patient Position: Sitting, BP Method: Large (Automatic))   Pulse 65   Wt 115.6 kg (254 lb 13.6 oz)   BMI 46.61 kg/m²     PHYSICAL EXAMINATION:  GENERAL: Well appearing, in no acute distress. Obese.  PSYCH:  Mood and affect is  appropriate.  Awake, alert, and oriented x 3.  SKIN: Skin color, texture, turgor normal, no rashes or lesions  HEENT: Normocephalic, atraumatic.  EOM intact.  CV: Radial pulses are 2+.  RESP:  Respirations are unlabored.  GI: Abdomen soft and non-tender.  MSK:  No atrophy or tone abnormalities are noted.      Neck: No pain with neck flexion, extension, or lateral rotation.  No obvious deformity or signs of trauma.  Normal cervical spine range of motion.    Back: Straight leg raising in the sitting and supine positions is negative for  radicular pain. Mild tenderness to palpation over the lower lumbar spine and paraspinous muscles.  Negative for pain with facet loading and back extension/rotation. Normal range of motion without pain reproduction.    Buttocks:  No pain to palpation over the PSIS. Sacroiliac joint maneuvers are negative for pain.    Extremities:  Peripheral joint ROM is full and pain free without obvious instability or laxity in all four extremities. Exquisite tenderness to palpation over the right GT bursa reproducing the patient's pain. Tenderness to palpation over the left GT bursa. No edema or skin discolorations noted.     Gait:  Gait is normal.    NEUR:  Bilateral upper and lower extremity coordination and muscle stretch reflexes are physiologic and symmetric. Strength testing is 5/5 throughout all muscle groups in the upper and lower extremities. No loss of sensation is noted.     ASSESSMENT: 47 y.o. female with bilateral hip and thigh pain (R>L), consistent with    1. Greater trochanteric bursitis of both hips    2. Lumbar spondylosis        PLAN:     - I have stressed the importance of physical activity and a home exercise plan to help with pain and improve health.  - Referral to Physical therapy.  - Rx compound pain cream to apply to painful area.  - Right hip GT bursa injection in clinic today.  - RTC when PT complete.  - Counseled patient regarding the importance of physical  therapy.    Over 25 minutes spent with the patient today with greater than 50% of the time spent in face-to-face counseling.      The above plan and management options were discussed at length with patient. Patient is in agreement with the above and verbalized understanding. It will be communicated with the referring physician via electronic record, fax, or mail.    Naun Melgoza III  12/04/2017       Procedure: Greater trochanteric bursa injection right side  Diagnosis: Greater trochanteric bursitis    Informed consent was obtained after discussing the risks.  The area over the right greater trochanteric bursa was cleaned with betadine.  A skin wheal was made with 2 mL of Lidocaine 1%. A 20-gauge 3.5 inch needle was then inserted until contacting os on the greater trochanter and then withdrawn slightly.  Aspiration was negative for blood.  A mixture of 40 mg of Kenalog and 6 mL of 0.25% Bupivacaine was then injected.  The needle was removed and a bandage was then applied.  The patient tolerated the procedure well.      Naun Melgoza III  12/04/2017

## 2018-01-18 RX ORDER — OMEPRAZOLE 40 MG/1
CAPSULE, DELAYED RELEASE ORAL
Qty: 30 CAPSULE | Refills: 11 | OUTPATIENT
Start: 2018-01-18

## 2018-01-23 RX ORDER — OMEPRAZOLE 40 MG/1
CAPSULE, DELAYED RELEASE ORAL
Qty: 30 CAPSULE | Refills: 11 | OUTPATIENT
Start: 2018-01-23

## 2018-01-29 RX ORDER — NYSTATIN 100000 [USP'U]/G
POWDER TOPICAL
Qty: 60 G | Refills: 0 | Status: SHIPPED | OUTPATIENT
Start: 2018-01-29 | End: 2018-08-10 | Stop reason: SDUPTHER

## 2018-02-20 ENCOUNTER — OFFICE VISIT (OUTPATIENT)
Dept: VASCULAR SURGERY | Facility: CLINIC | Age: 48
End: 2018-02-20
Payer: COMMERCIAL

## 2018-02-20 ENCOUNTER — HOSPITAL ENCOUNTER (OUTPATIENT)
Dept: VASCULAR SURGERY | Facility: CLINIC | Age: 48
Discharge: HOME | End: 2018-02-20
Payer: COMMERCIAL

## 2018-02-20 VITALS
HEIGHT: 62 IN | WEIGHT: 262.38 LBS | BODY MASS INDEX: 48.28 KG/M2 | HEART RATE: 75 BPM | SYSTOLIC BLOOD PRESSURE: 137 MMHG | TEMPERATURE: 99 F | DIASTOLIC BLOOD PRESSURE: 90 MMHG

## 2018-02-20 DIAGNOSIS — M79.89 PAIN AND SWELLING OF LEFT LOWER LEG: ICD-10-CM

## 2018-02-20 DIAGNOSIS — M79.662 PAIN AND SWELLING OF LEFT LOWER LEG: ICD-10-CM

## 2018-02-20 DIAGNOSIS — M79.89 PAIN AND SWELLING OF LEFT LOWER LEG: Primary | ICD-10-CM

## 2018-02-20 DIAGNOSIS — M25.551 RIGHT HIP PAIN: Primary | ICD-10-CM

## 2018-02-20 DIAGNOSIS — I87.2 VENOUS INSUFFICIENCY: ICD-10-CM

## 2018-02-20 DIAGNOSIS — M79.662 PAIN AND SWELLING OF LEFT LOWER LEG: Primary | ICD-10-CM

## 2018-02-20 PROCEDURE — 3008F BODY MASS INDEX DOCD: CPT | Mod: S$GLB,,, | Performed by: SURGERY

## 2018-02-20 PROCEDURE — 99999 PR PBB SHADOW E&M-EST. PATIENT-LVL III: CPT | Mod: PBBFAC,,, | Performed by: SURGERY

## 2018-02-20 PROCEDURE — 99213 OFFICE O/P EST LOW 20 MIN: CPT | Mod: S$GLB,,, | Performed by: SURGERY

## 2018-02-20 PROCEDURE — 93971 EXTREMITY STUDY: CPT | Mod: S$GLB,,, | Performed by: SURGERY

## 2018-02-20 NOTE — PROGRESS NOTES
HISTORY OF PRESENT ILLNESS:  A 47-year-old female who is self-referred to rule   out a DVT in her right leg.  She has a six-month history of pain in the right   leg.  She tells me she was initially diagnosed with sciatica, had an orthopedic   evaluation, which was evidently unrevealing.  She states that at one point she   got a Medrol Dosepak, which did alleviate her symptoms.    Subsequently, she has been seen by Pain Management, who she has had injections   with, which has improved things temporarily.    However, she still has burning pain in her shin and after a Google search was   worried that this could be a DVT.  She has no prior history of DVT.    PAST MEDICAL HISTORY:  1.  Morbid obesity, status post bariatric surgery in 2015.  2.  Prior prophylactic IVC filter placement prior to this surgery in 2015 with   subsequent retrieval.    PAST SURGICAL HISTORY:  1.  As above.  2.  Hysterectomy.    FAMILY HISTORY:  Positive for cancer and Alzheimer disease.    SOCIAL HISTORY:  Former smoker.    MEDICATIONS:  No anticoagulants or antiplatelet agents.    ALLERGIES:  Erythromycin.    REVIEW OF SYSTEMS:  Denies postprandial pain or DVT.  All other systems including eyes, ENT, respiratory, musculoskeletal, breast,   psychiatric, heme, lymph, allergy and immune are negative.    PHYSICAL EXAMINATION:  VITAL SIGNS:  See nursing note.  GENERAL:  She is no acute distress.  RESPIRATORY:  Normal effort.  Clear to auscultation.  CARDIOVASCULAR:  Regular rhythm, nondisplaced PMI, no murmur.  VASCULAR:  2+ radial, brachial and  DP pulses are 2+ bilaterally.  EXTREMITIES:  Without clubbing, cyanosis or edema.  There is no significant leg   swelling noted.  NEUROLOGIC:  Cranial nerves VII through XII are intact, 5/5 motor strength in   all extremities.    ASSESSMENT:  Given the chronicity of her leg symptoms, I think it unlikely that   this is a DVT.  We will rule out with an ultrasound.    She has excellent arterial flow with  easily palpable DP pulses.    I have told her that it is unlikely that this is due to a vascular etiology, but   we will rule out the DVT for sake of completeness.    PLAN:  Obtain a right lower extremity venous duplex to rule out DVT.      LIDIA/ANTHONY  dd: 02/20/2018 16:50:49 (CST)  td: 02/21/2018 13:58:14 (CST)  Doc ID   #[id number]  Job ID #[id number]    CC:

## 2018-08-03 RX ORDER — OMEPRAZOLE 40 MG/1
CAPSULE, DELAYED RELEASE ORAL
Qty: 30 CAPSULE | Refills: 11 | OUTPATIENT
Start: 2018-08-03

## 2018-08-08 RX ORDER — OMEPRAZOLE 40 MG/1
CAPSULE, DELAYED RELEASE ORAL
Qty: 30 CAPSULE | Refills: 11 | OUTPATIENT
Start: 2018-08-08

## 2018-08-10 RX ORDER — NYSTATIN 100000 [USP'U]/G
POWDER TOPICAL
Qty: 60 G | Refills: 0 | Status: SHIPPED | OUTPATIENT
Start: 2018-08-10 | End: 2019-02-26 | Stop reason: SDUPTHER

## 2018-08-14 RX ORDER — OMEPRAZOLE 40 MG/1
CAPSULE, DELAYED RELEASE ORAL
Qty: 30 CAPSULE | Refills: 11 | OUTPATIENT
Start: 2018-08-14

## 2019-01-02 PROBLEM — E61.1 IRON DEFICIENCY: Status: RESOLVED | Noted: 2017-04-08 | Resolved: 2019-01-02

## 2019-01-02 PROBLEM — Z90.710 H/O HYSTERECTOMY FOR BENIGN DISEASE: Status: RESOLVED | Noted: 2017-02-02 | Resolved: 2019-01-02

## 2019-01-03 PROBLEM — M25.551 RIGHT HIP PAIN: Status: RESOLVED | Noted: 2017-11-05 | Resolved: 2019-01-03

## 2019-01-03 PROBLEM — M54.2 CERVICALGIA: Status: RESOLVED | Noted: 2017-04-08 | Resolved: 2019-01-03

## 2019-02-05 ENCOUNTER — TELEPHONE (OUTPATIENT)
Dept: VASCULAR SURGERY | Facility: CLINIC | Age: 49
End: 2019-02-05

## 2019-02-05 NOTE — TELEPHONE ENCOUNTER
----- Message from Atif Moreno sent at 2/5/2019  2:56 PM CST -----  Contact: Pt  She is calling in regards to getting a second opinion after seeing three different doctors within the last 3 weeks in [place], [place] for a vein that is causing her severe pain and turning black & blue please advise the pt as soon as possible,please advise [phone] (home)

## 2019-02-06 ENCOUNTER — TELEPHONE (OUTPATIENT)
Dept: VASCULAR SURGERY | Facility: CLINIC | Age: 49
End: 2019-02-06

## 2019-02-06 DIAGNOSIS — R60.0 LOCALIZED EDEMA: Primary | ICD-10-CM

## 2019-02-06 NOTE — TELEPHONE ENCOUNTER
Informed of Dr Phan's ultrasound guidelines for venous symptoms.  Ultrasound of deep and superficial veins BLE ordered and scheduled per [facility], and consultation also scheduled with Dr Phan.     Voices understanding, will call back as needed.

## 2019-02-06 NOTE — TELEPHONE ENCOUNTER
----- Message from Aliya Forrester sent at 2/6/2019 10:54 AM CST -----    Patients  Is calling to make an corinne l leg  Thigh is  Swollen an red and  Hot   Believed  To  Be  Vascular   Problem  // pt  Was  Ref   From ER   To  See  A  Dermatologist  , dermatologist   Stated  Pt  Needs a  Vascular  Doctor  , for  Any  Questions [phone]

## 2019-02-26 RX ORDER — NYSTATIN 100000 [USP'U]/G
POWDER TOPICAL
Qty: 60 G | Refills: 0 | Status: SHIPPED | OUTPATIENT
Start: 2019-02-26 | End: 2019-08-26 | Stop reason: SDUPTHER

## 2019-04-02 ENCOUNTER — HOSPITAL ENCOUNTER (OUTPATIENT)
Dept: RADIOLOGY | Facility: HOSPITAL | Age: 49
Discharge: HOME | End: 2019-04-02
Attending: FAMILY MEDICINE
Payer: COMMERCIAL

## 2019-04-02 DIAGNOSIS — Z82.49 FAMILY HISTORY OF ABDOMINAL AORTIC ANEURYSM (AAA): ICD-10-CM

## 2019-04-02 PROCEDURE — 76775 US EXAM ABDO BACK WALL LIM: CPT | Mod: 26,,, | Performed by: RADIOLOGY

## 2019-04-02 PROCEDURE — 76775 US ABDOMINAL AORTA: ICD-10-PCS | Mod: 26,,, | Performed by: RADIOLOGY

## 2019-04-02 PROCEDURE — 76775 US EXAM ABDO BACK WALL LIM: CPT | Mod: TC,PO

## 2019-05-15 ENCOUNTER — OFFICE VISIT (OUTPATIENT)
Dept: OTOLARYNGOLOGY | Facility: CLINIC | Age: 49
End: 2019-05-15
Payer: COMMERCIAL

## 2019-05-15 VITALS — BODY MASS INDEX: 50.91 KG/M2 | HEIGHT: 62 IN | WEIGHT: 276.69 LBS

## 2019-05-15 DIAGNOSIS — H92.02 OTALGIA OF LEFT EAR: Primary | ICD-10-CM

## 2019-05-15 DIAGNOSIS — H61.23 HEARING LOSS DUE TO CERUMEN IMPACTION, BILATERAL: ICD-10-CM

## 2019-05-15 DIAGNOSIS — H61.23 BILATERAL IMPACTED CERUMEN: ICD-10-CM

## 2019-05-15 PROCEDURE — 3008F PR BODY MASS INDEX (BMI) DOCUMENTED: ICD-10-PCS | Mod: CPTII,S$GLB,, | Performed by: NURSE PRACTITIONER

## 2019-05-15 PROCEDURE — 3008F BODY MASS INDEX DOCD: CPT | Mod: CPTII,S$GLB,, | Performed by: NURSE PRACTITIONER

## 2019-05-15 PROCEDURE — 99999 PR PBB SHADOW E&M-EST. PATIENT-LVL III: CPT | Mod: PBBFAC,,, | Performed by: NURSE PRACTITIONER

## 2019-05-15 PROCEDURE — 69210 REMOVE IMPACTED EAR WAX UNI: CPT | Mod: S$GLB,,, | Performed by: NURSE PRACTITIONER

## 2019-05-15 PROCEDURE — 99203 PR OFFICE/OUTPT VISIT, NEW, LEVL III, 30-44 MIN: ICD-10-PCS | Mod: 25,S$GLB,, | Performed by: NURSE PRACTITIONER

## 2019-05-15 PROCEDURE — 99203 OFFICE O/P NEW LOW 30 MIN: CPT | Mod: 25,S$GLB,, | Performed by: NURSE PRACTITIONER

## 2019-05-15 PROCEDURE — 69210 PR REMOVAL IMPACTED CERUMEN REQUIRING INSTRUMENTATION, UNILATERAL: ICD-10-PCS | Mod: S$GLB,,, | Performed by: NURSE PRACTITIONER

## 2019-05-15 PROCEDURE — 99999 PR PBB SHADOW E&M-EST. PATIENT-LVL III: ICD-10-PCS | Mod: PBBFAC,,, | Performed by: NURSE PRACTITIONER

## 2019-05-15 NOTE — PROGRESS NOTES
Subjective:       Patient ID: Felicia Drew is a 48 y.o. female.    Chief Complaint: Cerumen Impaction and Ear Fullness (left ear)    HPI   Patient has been having intermittent sharp pains AS which she mentioned to her PCP back in January; given cortisporin gtts. Then in late February water stuck AS after showering, so she used Debrox for approximately one week. Left ear still feels clogged up with muffled hearing.     Review of Systems   Constitutional: Negative.    HENT: Positive for ear pain and hearing loss.    Eyes: Negative.    Respiratory: Negative.    Cardiovascular: Negative.    Gastrointestinal: Negative.    Musculoskeletal: Negative.    Skin: Negative.    Neurological: Negative.    Hematological: Negative.    Psychiatric/Behavioral: Negative.        Objective:      Physical Exam   Constitutional: She is oriented to person, place, and time. Vital signs are normal. She appears well-developed and well-nourished. She is cooperative. She does not appear ill. No distress.   obese   HENT:   Head: Normocephalic and atraumatic.   Right Ear: Hearing, tympanic membrane, external ear and ear canal normal. Tympanic membrane is not erythematous. No middle ear effusion.   Left Ear: Hearing, tympanic membrane, external ear and ear canal normal. Tympanic membrane is not erythematous.  No middle ear effusion.   Nose: Nose normal.   Mouth/Throat: Uvula is midline, oropharynx is clear and moist and mucous membranes are normal.     SEPARATE PROCEDURE IN OFFICE:   Procedure: Removal of impacted cerumen, bilateral   Pre Procedure Diagnosis: Cerumen Impaction   Post Procedure Diagnosis: Cerumen Impaction   Verbal informed consent in regards to risk of trauma to ear canal, ear drum or hearing, discomfort during procedure and/or inability to remove cerumen impaction in one session or unforeseen events or complications.   No anesthesia.     Procedure in detail:   Ear canal visualized bilateral with appropriate size ear  speculum utilizing Operating Head Binocular Otomicroscope   Utilizing the following:  Ring curet, alligator forceps, and/or suction cannula was used. The impacted cerumen of the ear canals was removed atraumatically. The TM and EAC were then inspected and found to be clear of wax. See description of TMs/EACs in PE above.   Complications: No   Condition: Improved/Good     Eyes: EOM and lids are normal. Right eye exhibits no discharge. Left eye exhibits no discharge. No scleral icterus.   Neck: Trachea normal and normal range of motion. Neck supple. No tracheal deviation present.   Cardiovascular: Normal rate.   Pulmonary/Chest: Effort normal. No stridor. No respiratory distress. She has no wheezes.   Musculoskeletal: Normal range of motion.   Neurological: She is alert and oriented to person, place, and time. She has normal strength. Coordination and gait normal.   Skin: Skin is warm, dry and intact. She is not diaphoretic. No cyanosis. No pallor.   Psychiatric: She has a normal mood and affect. Her speech is normal and behavior is normal. Judgment and thought content normal. Cognition and memory are normal.   Nursing note and vitals reviewed.      Assessment:     Bilateral cerumen impactions removed      Plan:     Pt reported immediate resolution of aural symptoms following cerumen impaction removal    Recommend Debrox weekly  Return as needed for further ENT symptoms/concerns

## 2019-08-26 RX ORDER — NYSTATIN 100000 [USP'U]/G
POWDER TOPICAL
Qty: 60 G | Refills: 0 | Status: SHIPPED | OUTPATIENT
Start: 2019-08-26 | End: 2019-11-13 | Stop reason: SDUPTHER

## 2019-11-13 RX ORDER — NYSTATIN 100000 [USP'U]/G
POWDER TOPICAL
Qty: 60 G | Refills: 0 | Status: SHIPPED | OUTPATIENT
Start: 2019-11-13 | End: 2019-12-11 | Stop reason: ALTCHOICE

## 2019-12-11 ENCOUNTER — OFFICE VISIT (OUTPATIENT)
Dept: FAMILY MEDICINE | Facility: CLINIC | Age: 49
End: 2019-12-11
Payer: COMMERCIAL

## 2019-12-11 ENCOUNTER — HOSPITAL ENCOUNTER (OUTPATIENT)
Dept: RADIOLOGY | Facility: HOSPITAL | Age: 49
Discharge: HOME | End: 2019-12-11
Attending: INTERNAL MEDICINE
Payer: COMMERCIAL

## 2019-12-11 VITALS
WEIGHT: 284 LBS | TEMPERATURE: 99 F | HEIGHT: 62 IN | BODY MASS INDEX: 52.26 KG/M2 | SYSTOLIC BLOOD PRESSURE: 127 MMHG | HEART RATE: 64 BPM | DIASTOLIC BLOOD PRESSURE: 77 MMHG

## 2019-12-11 DIAGNOSIS — Z00.00 ENCOUNTER FOR ANNUAL HEALTH EXAMINATION: ICD-10-CM

## 2019-12-11 DIAGNOSIS — Z86.718 HISTORY OF DVT (DEEP VEIN THROMBOSIS): ICD-10-CM

## 2019-12-11 DIAGNOSIS — Z12.11 COLON CANCER SCREENING: Primary | ICD-10-CM

## 2019-12-11 DIAGNOSIS — G89.29 CHRONIC LEFT SHOULDER PAIN: ICD-10-CM

## 2019-12-11 DIAGNOSIS — M25.512 CHRONIC LEFT SHOULDER PAIN: ICD-10-CM

## 2019-12-11 DIAGNOSIS — I87.2 VENOUS INSUFFICIENCY: ICD-10-CM

## 2019-12-11 DIAGNOSIS — Z13.6 ENCOUNTER FOR LIPID SCREENING FOR CARDIOVASCULAR DISEASE: ICD-10-CM

## 2019-12-11 DIAGNOSIS — Z13.220 ENCOUNTER FOR LIPID SCREENING FOR CARDIOVASCULAR DISEASE: ICD-10-CM

## 2019-12-11 DIAGNOSIS — Z98.84 S/P BARIATRIC SURGERY: ICD-10-CM

## 2019-12-11 DIAGNOSIS — J01.00 ACUTE NON-RECURRENT MAXILLARY SINUSITIS: ICD-10-CM

## 2019-12-11 PROCEDURE — 3008F PR BODY MASS INDEX (BMI) DOCUMENTED: ICD-10-PCS | Mod: CPTII,S$GLB,, | Performed by: INTERNAL MEDICINE

## 2019-12-11 PROCEDURE — 99999 PR PBB SHADOW E&M-EST. PATIENT-LVL V: CPT | Mod: PBBFAC,,, | Performed by: INTERNAL MEDICINE

## 2019-12-11 PROCEDURE — 99214 PR OFFICE/OUTPT VISIT, EST, LEVL IV, 30-39 MIN: ICD-10-PCS | Mod: S$GLB,,, | Performed by: INTERNAL MEDICINE

## 2019-12-11 PROCEDURE — 3008F BODY MASS INDEX DOCD: CPT | Mod: CPTII,S$GLB,, | Performed by: INTERNAL MEDICINE

## 2019-12-11 PROCEDURE — 99999 PR PBB SHADOW E&M-EST. PATIENT-LVL V: ICD-10-PCS | Mod: PBBFAC,,, | Performed by: INTERNAL MEDICINE

## 2019-12-11 PROCEDURE — 73030 XR SHOULDER COMPLETE 2 OR MORE VIEWS LEFT: ICD-10-PCS | Mod: 26,LT,, | Performed by: RADIOLOGY

## 2019-12-11 PROCEDURE — 73030 X-RAY EXAM OF SHOULDER: CPT | Mod: TC,PO,LT

## 2019-12-11 PROCEDURE — 73030 X-RAY EXAM OF SHOULDER: CPT | Mod: 26,LT,, | Performed by: RADIOLOGY

## 2019-12-11 PROCEDURE — 99214 OFFICE O/P EST MOD 30 MIN: CPT | Mod: S$GLB,,, | Performed by: INTERNAL MEDICINE

## 2019-12-11 RX ORDER — FLUTICASONE PROPIONATE 50 MCG
SPRAY, SUSPENSION (ML) NASAL
Qty: 16 G | Refills: 12 | Status: SHIPPED | OUTPATIENT
Start: 2019-12-11 | End: 2022-06-06

## 2019-12-11 RX ORDER — PROMETHAZINE HYDROCHLORIDE AND DEXTROMETHORPHAN HYDROBROMIDE 6.25; 15 MG/5ML; MG/5ML
5 SYRUP ORAL EVERY 6 HOURS PRN
Qty: 118 ML | Refills: 0 | Status: SHIPPED | OUTPATIENT
Start: 2019-12-11 | End: 2019-12-21

## 2019-12-11 RX ORDER — AMOXICILLIN 875 MG/1
875 TABLET, FILM COATED ORAL 2 TIMES DAILY
Qty: 20 TABLET | Refills: 0 | Status: SHIPPED | OUTPATIENT
Start: 2019-12-11 | End: 2019-12-21

## 2019-12-11 NOTE — PROGRESS NOTES
Assessment/Plan:    S/P bariatric surgery  S/p tavia en y in 2016. Has lost 170lb but gained 70lb back recently. Reports that she knows its her diet and she has been stressed. Plan to start back on diet. Due for nutritional labs. Does have a history vitamin D and vitamin B12 deficiency.  Only on multivitamin now. On prilosec for ulcer prevention. No GERD symptoms    Venous insufficiency  Chronic history.  Continue to elevate feet as much as possible.  Can use compression stockings as well.    History of DVT (deep vein thrombosis)  Occurred in 30's after a flight. No hx of recurrence. No longer requires anticoagulation    Encounter for annual health examination  Complete history and physical was completed today.  Complete and thorough medication reconciliation was performed.  Discussed risks and benefits of medications.  Advised patient on orders and health maintenance.  We discussed old records and old labs if available.  Will request any records not available through epic.  Continue current medications listed on your summary sheet.    Chronic left shoulder pain  Chronic history of left, anterior shoulder pain. Pain is progressing.  Worse with extension of shoulder.  Denies any trauma.  Plan for x-ray today.  Also refer to Ortho for further evaluation and treatment    Acute non-recurrent maxillary sinusitis  Symptoms consistent with acute bacterial sinusitis.  Symptoms have been present for 1 week.  No relief with over-the-counter medications.  Was started on antibiotic.  Also provided Flonase and cough medicine for symptoms.  Increase fluid intake and get plenty of rest.  Follow-up if no improvement    ______________________________________________________________________________________________________________________________    Orders this visit:    Colon cancer screening  -     Cancel: Case request GI: COLONOSCOPY  -     Ambulatory referral to Gastroenterology    S/P bariatric surgery  -     Copper, serum;  Future; Expected date: 12/11/2019  -     Ferritin; Future; Expected date: 12/11/2019  -     Folate; Future; Expected date: 12/11/2019  -     Iron and TIBC; Future; Expected date: 12/11/2019  -     PTH, intact; Future; Expected date: 12/11/2019  -     Vitamin B1; Future; Expected date: 12/11/2019  -     Vitamin B12; Future; Expected date: 12/11/2019  -     Vitamin D; Future; Expected date: 12/11/2019  -     Zinc; Future; Expected date: 12/11/2019    History of DVT (deep vein thrombosis)    Chronic left shoulder pain  -     Ambulatory referral/consult to Orthopedics; Future; Expected date: 12/11/2019  -     X-Ray Shoulder 2 or More Views Left; Future; Expected date: 12/11/2019    Encounter for annual health examination  -     Comprehensive metabolic panel; Future; Expected date: 12/11/2019  -     CBC auto differential; Future; Expected date: 12/11/2019  -     Hemoglobin A1c; Future; Expected date: 12/11/2019  -     Lipid panel; Future; Expected date: 12/11/2019    Encounter for lipid screening for cardiovascular disease  -     Lipid panel; Future; Expected date: 12/11/2019    Acute non-recurrent maxillary sinusitis  -     amoxicillin (AMOXIL) 875 MG tablet; Take 1 tablet (875 mg total) by mouth 2 (two) times daily. for 10 days  Dispense: 20 tablet; Refill: 0  -     fluticasone propionate (FLONASE) 50 mcg/actuation nasal spray; Use 2 sprays to each nostril daily  Dispense: 16 g; Refill: 12  -     promethazine-dextromethorphan (PROMETHAZINE-DM) 6.25-15 mg/5 mL Syrp; Take 5 mLs by mouth every 6 (six) hours as needed (cough).  Dispense: 118 mL; Refill: 0    Venous insufficiency      Follow up in about 1 year (around 12/11/2020).    Terrie Hartmann MD  ______________________________________________________________________________________________________________________________    HPI:    Patient is a new patient to me, here to establish care.  Patient is a 49-year-old female with a past medical history of bariatric surgery  and venous insufficiency.    Patient does have a history of vitamin B12 and vitamin-D deficiency after bariatric surgery.  She is no longer on supplementation but does take a daily multivitamin.  She is in need of an yearly nutritional labs.  She is no longer followed by bariatric surgery.  She has put on 70 lb over last year.  Reports that this is due to diet noncompliance.  States that she is planning on starting her diet again.    New complaint of left upper arm and shoulder pain. Pain described as a throbbing pain, first started in upper arm, now in shoulder.  Pain is worse with use.  Also notices pain when she carries her purse.  Pain 1st started 6 months ago after having the shingles vaccine.  Denies any alleviating factors.    No other complaints today.  Routine health maintenance discussed.  Routine labs ordered.  Will get flu shot at outside pharmacy.  Referral for colonoscopy placed as patient would like to have this done in [place].    Past Medical History:  Past Medical History:   Diagnosis Date    H/O hysterectomy for benign disease with BSO 2017    Morbid obesity     Varicose veins     Vitamin D deficiency      Past Surgical History:   Procedure Laterality Date     SECTION      CHOLECYSTECTOMY      GASTRIC BYPASS      ZACK FILTER PLACEMENT      zack filter removal      HYSTERECTOMY      DANNA with BSO;    lap band removal       LAPAROSCOPIC GASTRIC BANDING      OOPHORECTOMY      w/hysteretomy @ age 41    TONSILLECTOMY      VASCULAR SURGERY       Review of patient's allergies indicates:   Allergen Reactions    Erythromycin Rash    Erythromycin (bulk) Rash     Social History     Tobacco Use    Smoking status: Former Smoker     Last attempt to quit: 2009     Years since quitting: 10.9    Smokeless tobacco: Never Used   Substance Use Topics    Alcohol use: Yes     Comment: socially    Drug use: No     Family History   Problem Relation Age of Onset    Aortic  aneurysm Mother     Cancer Father         lung cancer/quit tobacco earlier;    Cancer Brother         melanoma;    COPD Brother         emphysema; quit tobacco;    Drug abuse Brother     Aortic aneurysm Brother     Dementia Brother         hx of hypoxia s/p aspiration PNA    Hyperlipidemia Sister     Hypertension Sister     BAYLEE disease Sister     Rheum arthritis Sister     Aortic aneurysm Sister     Drug abuse Brother      Current Outpatient Medications on File Prior to Visit   Medication Sig Dispense Refill    acetaminophen (TYLENOL) 500 MG tablet Take 1 tablet (500 mg total) by mouth every 6 (six) hours as needed for Pain.  0    cyanocobalamin, vitamin B-12, 2,500 mcg Subl Place 1 tablet under the tongue once a week.      docusate sodium (COLACE) 100 MG capsule Take 1 capsule (100 mg total) by mouth 2 (two) times daily.  0    omeprazole (PRILOSEC) 40 MG capsule TAKE 1 CAPSULE BY MOUTH 2 TIMES DAILY BEFORE MEALS. 30 capsule 11    CALCIUM CITRATE/VITAMIN D3 (CALCIUM CITRATE + D ORAL) Take by mouth 3 (three) times daily. Pt takes 1000mg tid      ergocalciferol (ERGOCALCIFEROL) 50,000 unit Cap Take 1 capsule (50,000 Units total) by mouth twice a week. TAKE 1 CAPSULE (50,000 UNITS TOTAL) BY MOUTH EVERY 7 DAYS. (Patient not taking: Reported on 12/11/2019) 24 capsule 0    [DISCONTINUED] NYSTOP powder APPLY TO AFFECTED AREA 3 TIMES A DAY (Patient not taking: Reported on 12/11/2019) 60 g 0    [DISCONTINUED] ondansetron (ZOFRAN-ODT) 4 MG TbDL TAKE 2 TABLETS BY MOUTH EVERY 6 HOURS IF NEEDED FOR NAUSEA (Patient not taking: Reported on 12/11/2019) 60 tablet 1    [DISCONTINUED] traMADol (ULTRAM) 50 mg tablet Take 1 tablet (50 mg total) by mouth every 6 (six) hours as needed for Pain. (Patient not taking: Reported on 12/11/2019) 12 tablet 0     No current facility-administered medications on file prior to visit.        Review of Systems   Constitutional: Negative for fever.   HENT: Positive for congestion  "and sore throat. Negative for drooling, ear discharge, ear pain and trouble swallowing.    Respiratory: Positive for cough and shortness of breath. Negative for stridor.    Cardiovascular: Negative for chest pain.   Gastrointestinal: Negative for abdominal pain, diarrhea and vomiting.   Musculoskeletal: Positive for arthralgias. Negative for joint swelling and neck pain.   Neurological: Positive for headaches.       Vitals:    12/11/19 0745   BP: 127/77   Pulse: 64   Temp: 98.8 °F (37.1 °C)   Weight: 128.8 kg (284 lb)   Height: 5' 2" (1.575 m)       Wt Readings from Last 3 Encounters:   12/11/19 128.8 kg (284 lb)   05/15/19 125.5 kg (276 lb 10.8 oz)   01/24/19 122.6 kg (270 lb 4.5 oz)       Physical Exam   Constitutional: She is oriented to person, place, and time. She appears well-developed and well-nourished. No distress.   HENT:   Head: Normocephalic and atraumatic.   Mouth/Throat: No oropharyngeal exudate.   Inflamed nasal mucosa  Mild oral pharyngeal erythema   Eyes: Conjunctivae and EOM are normal.   Neck: Normal range of motion. Neck supple.   Cardiovascular: Normal rate, regular rhythm, normal heart sounds and intact distal pulses.   No murmur heard.  Pulmonary/Chest: Effort normal and breath sounds normal. No respiratory distress.   Abdominal: Soft. Bowel sounds are normal. She exhibits no distension. There is no tenderness.   Musculoskeletal: Normal range of motion. She exhibits no edema or deformity.   Pain at anterior shoulder, worse with extension, pronation, cross-body maneuver.   Lymphadenopathy:     She has no cervical adenopathy.   Neurological: She is alert and oriented to person, place, and time.   Skin: Skin is warm and dry. No rash noted.   Psychiatric: She has a normal mood and affect.       Answers for HPI/ROS submitted by the patient on 12/10/2019   Sore throat  Chronicity: recurrent  Onset: 1 to 4 weeks ago  Progression since onset: gradually worsening  Pain worse on: neither  Fever: no " fever  Fever duration: less than 1 day  Pain - numeric: 7/10  hoarse voice: Yes  plugged ear sensation: No  swollen glands: Yes  strep: No  mono: No  Treatments tried: acetaminophen, cool liquids  Improvement on treatment: mild  Pain severity: moderate

## 2019-12-11 NOTE — ASSESSMENT & PLAN NOTE
Chronic history of left, anterior shoulder pain. Pain is progressing.  Worse with extension of shoulder.  Denies any trauma.  Plan for x-ray today.  Also refer to Ortho for further evaluation and treatment

## 2019-12-11 NOTE — ASSESSMENT & PLAN NOTE
Chronic history.  Continue to elevate feet as much as possible.  Can use compression stockings as well.

## 2019-12-11 NOTE — ASSESSMENT & PLAN NOTE
S/p tavia en y in 2016. Has lost 170lb but gained 70lb back recently. Reports that she knows its her diet and she has been stressed. Plan to start back on diet. Due for nutritional labs. Does have a history vitamin D and vitamin B12 deficiency.  Only on multivitamin now. On prilosec for ulcer prevention. No GERD symptoms

## 2019-12-11 NOTE — ASSESSMENT & PLAN NOTE
Symptoms consistent with acute bacterial sinusitis.  Symptoms have been present for 1 week.  No relief with over-the-counter medications.  Was started on antibiotic.  Also provided Flonase and cough medicine for symptoms.  Increase fluid intake and get plenty of rest.  Follow-up if no improvement

## 2019-12-12 ENCOUNTER — LAB VISIT (OUTPATIENT)
Dept: LAB | Facility: HOSPITAL | Age: 49
End: 2019-12-12
Attending: INTERNAL MEDICINE
Payer: COMMERCIAL

## 2019-12-12 DIAGNOSIS — Z00.00 ENCOUNTER FOR ANNUAL HEALTH EXAMINATION: ICD-10-CM

## 2019-12-12 DIAGNOSIS — Z98.84 S/P BARIATRIC SURGERY: ICD-10-CM

## 2019-12-12 DIAGNOSIS — Z13.6 ENCOUNTER FOR LIPID SCREENING FOR CARDIOVASCULAR DISEASE: ICD-10-CM

## 2019-12-12 DIAGNOSIS — Z13.220 ENCOUNTER FOR LIPID SCREENING FOR CARDIOVASCULAR DISEASE: ICD-10-CM

## 2019-12-12 LAB
25(OH)D3+25(OH)D2 SERPL-MCNC: 18 NG/ML (ref 30–96)
ALBUMIN SERPL BCP-MCNC: 3.8 G/DL (ref 3.5–5.2)
ALP SERPL-CCNC: 94 U/L (ref 55–135)
ALT SERPL W/O P-5'-P-CCNC: 13 U/L (ref 10–44)
ANION GAP SERPL CALC-SCNC: 8 MMOL/L (ref 8–16)
AST SERPL-CCNC: 15 U/L (ref 10–40)
BASOPHILS # BLD AUTO: 0.04 K/UL (ref 0–0.2)
BASOPHILS NFR BLD: 0.7 % (ref 0–1.9)
BILIRUB SERPL-MCNC: 0.6 MG/DL (ref 0.1–1)
BUN SERPL-MCNC: 11 MG/DL (ref 6–20)
CALCIUM SERPL-MCNC: 9.1 MG/DL (ref 8.7–10.5)
CHLORIDE SERPL-SCNC: 107 MMOL/L (ref 95–110)
CHOLEST SERPL-MCNC: 160 MG/DL (ref 120–199)
CHOLEST/HDLC SERPL: 2.7 {RATIO} (ref 2–5)
CO2 SERPL-SCNC: 28 MMOL/L (ref 23–29)
CREAT SERPL-MCNC: 0.7 MG/DL (ref 0.5–1.4)
DIFFERENTIAL METHOD: ABNORMAL
EOSINOPHIL # BLD AUTO: 0.1 K/UL (ref 0–0.5)
EOSINOPHIL NFR BLD: 1.6 % (ref 0–8)
ERYTHROCYTE [DISTWIDTH] IN BLOOD BY AUTOMATED COUNT: 13.2 % (ref 11.5–14.5)
EST. GFR  (AFRICAN AMERICAN): >60 ML/MIN/1.73 M^2
EST. GFR  (NON AFRICAN AMERICAN): >60 ML/MIN/1.73 M^2
ESTIMATED AVG GLUCOSE: 103 MG/DL (ref 68–131)
FERRITIN SERPL-MCNC: 40 NG/ML (ref 20–300)
FOLATE SERPL-MCNC: 8.7 NG/ML (ref 4–24)
GLUCOSE SERPL-MCNC: 85 MG/DL (ref 70–110)
HBA1C MFR BLD HPLC: 5.2 % (ref 4–5.6)
HCT VFR BLD AUTO: 44.1 % (ref 37–48.5)
HDLC SERPL-MCNC: 60 MG/DL (ref 40–75)
HDLC SERPL: 37.5 % (ref 20–50)
HGB BLD-MCNC: 13.8 G/DL (ref 12–16)
IMM GRANULOCYTES # BLD AUTO: 0.01 K/UL (ref 0–0.04)
IMM GRANULOCYTES NFR BLD AUTO: 0.2 % (ref 0–0.5)
IRON SERPL-MCNC: 102 UG/DL (ref 30–160)
LDLC SERPL CALC-MCNC: 78 MG/DL (ref 63–159)
LYMPHOCYTES # BLD AUTO: 0.9 K/UL (ref 1–4.8)
LYMPHOCYTES NFR BLD: 15 % (ref 18–48)
MCH RBC QN AUTO: 27.5 PG (ref 27–31)
MCHC RBC AUTO-ENTMCNC: 31.3 G/DL (ref 32–36)
MCV RBC AUTO: 88 FL (ref 82–98)
MONOCYTES # BLD AUTO: 0.4 K/UL (ref 0.3–1)
MONOCYTES NFR BLD: 6.4 % (ref 4–15)
NEUTROPHILS # BLD AUTO: 4.4 K/UL (ref 1.8–7.7)
NEUTROPHILS NFR BLD: 76.1 % (ref 38–73)
NONHDLC SERPL-MCNC: 100 MG/DL
NRBC BLD-RTO: 0 /100 WBC
PLATELET # BLD AUTO: 224 K/UL (ref 150–350)
PMV BLD AUTO: 12.6 FL (ref 9.2–12.9)
POTASSIUM SERPL-SCNC: 3.8 MMOL/L (ref 3.5–5.1)
PROT SERPL-MCNC: 7.1 G/DL (ref 6–8.4)
PTH-INTACT SERPL-MCNC: 113 PG/ML (ref 9–77)
RBC # BLD AUTO: 5.02 M/UL (ref 4–5.4)
SATURATED IRON: 23 % (ref 20–50)
SODIUM SERPL-SCNC: 143 MMOL/L (ref 136–145)
TOTAL IRON BINDING CAPACITY: 435 UG/DL (ref 250–450)
TRANSFERRIN SERPL-MCNC: 294 MG/DL (ref 200–375)
TRIGL SERPL-MCNC: 110 MG/DL (ref 30–150)
VIT B12 SERPL-MCNC: 602 PG/ML (ref 210–950)
WBC # BLD AUTO: 5.74 K/UL (ref 3.9–12.7)

## 2019-12-12 PROCEDURE — 83970 ASSAY OF PARATHORMONE: CPT

## 2019-12-12 PROCEDURE — 83540 ASSAY OF IRON: CPT

## 2019-12-12 PROCEDURE — 84630 ASSAY OF ZINC: CPT

## 2019-12-12 PROCEDURE — 82306 VITAMIN D 25 HYDROXY: CPT

## 2019-12-12 PROCEDURE — 80061 LIPID PANEL: CPT

## 2019-12-12 PROCEDURE — 80053 COMPREHEN METABOLIC PANEL: CPT

## 2019-12-12 PROCEDURE — 82746 ASSAY OF FOLIC ACID SERUM: CPT

## 2019-12-12 PROCEDURE — 82525 ASSAY OF COPPER: CPT

## 2019-12-12 PROCEDURE — 84425 ASSAY OF VITAMIN B-1: CPT

## 2019-12-12 PROCEDURE — 82607 VITAMIN B-12: CPT

## 2019-12-12 PROCEDURE — 36415 COLL VENOUS BLD VENIPUNCTURE: CPT | Mod: PO

## 2019-12-12 PROCEDURE — 83036 HEMOGLOBIN GLYCOSYLATED A1C: CPT

## 2019-12-12 PROCEDURE — 82728 ASSAY OF FERRITIN: CPT

## 2019-12-12 PROCEDURE — 85025 COMPLETE CBC W/AUTO DIFF WBC: CPT

## 2019-12-13 ENCOUNTER — TELEPHONE (OUTPATIENT)
Dept: FAMILY MEDICINE | Facility: CLINIC | Age: 49
End: 2019-12-13

## 2019-12-13 DIAGNOSIS — E55.9 VITAMIN D INSUFFICIENCY: Primary | ICD-10-CM

## 2019-12-13 NOTE — TELEPHONE ENCOUNTER
Vitamin D level low. Start daily Vit D3 800 units daily. Remainder of vitamin levels are normal. Normal blood counts, chemistry, liver and kidney function.     Will need to recheck vitamin D level in 3 months    I have signed for the following orders AND/OR meds.  Please call the patient and ask the patient to schedule the testing AND/OR inform about any medications that were sent.      Orders Placed This Encounter   Procedures    Vitamin D     Standing Status:   Future     Standing Expiration Date:   2/10/2021

## 2019-12-16 ENCOUNTER — OFFICE VISIT (OUTPATIENT)
Dept: ORTHOPEDICS | Facility: CLINIC | Age: 49
End: 2019-12-16
Payer: COMMERCIAL

## 2019-12-16 VITALS — BODY MASS INDEX: 52.26 KG/M2 | WEIGHT: 284 LBS | HEIGHT: 62 IN

## 2019-12-16 DIAGNOSIS — G89.29 CHRONIC LEFT SHOULDER PAIN: ICD-10-CM

## 2019-12-16 DIAGNOSIS — Z86.718 HISTORY OF DVT (DEEP VEIN THROMBOSIS): Primary | ICD-10-CM

## 2019-12-16 DIAGNOSIS — M25.512 CHRONIC LEFT SHOULDER PAIN: ICD-10-CM

## 2019-12-16 LAB
COPPER SERPL-MCNC: 1410 UG/L (ref 810–1990)
ZINC SERPL-MCNC: 65 UG/DL (ref 60–130)

## 2019-12-16 PROCEDURE — 99214 PR OFFICE/OUTPT VISIT, EST, LEVL IV, 30-39 MIN: ICD-10-PCS | Mod: 25,S$GLB,, | Performed by: ORTHOPAEDIC SURGERY

## 2019-12-16 PROCEDURE — 99214 OFFICE O/P EST MOD 30 MIN: CPT | Mod: 25,S$GLB,, | Performed by: ORTHOPAEDIC SURGERY

## 2019-12-16 PROCEDURE — 3008F BODY MASS INDEX DOCD: CPT | Mod: CPTII,S$GLB,, | Performed by: ORTHOPAEDIC SURGERY

## 2019-12-16 PROCEDURE — 99999 PR PBB SHADOW E&M-EST. PATIENT-LVL III: ICD-10-PCS | Mod: PBBFAC,,, | Performed by: ORTHOPAEDIC SURGERY

## 2019-12-16 PROCEDURE — 20610 LARGE JOINT ASPIRATION/INJECTION: L SUBACROMIAL BURSA: ICD-10-PCS | Mod: LT,S$GLB,, | Performed by: ORTHOPAEDIC SURGERY

## 2019-12-16 PROCEDURE — 20610 DRAIN/INJ JOINT/BURSA W/O US: CPT | Mod: LT,S$GLB,, | Performed by: ORTHOPAEDIC SURGERY

## 2019-12-16 PROCEDURE — 3008F PR BODY MASS INDEX (BMI) DOCUMENTED: ICD-10-PCS | Mod: CPTII,S$GLB,, | Performed by: ORTHOPAEDIC SURGERY

## 2019-12-16 PROCEDURE — 99999 PR PBB SHADOW E&M-EST. PATIENT-LVL III: CPT | Mod: PBBFAC,,, | Performed by: ORTHOPAEDIC SURGERY

## 2019-12-16 RX ORDER — TRIAMCINOLONE ACETONIDE 40 MG/ML
80 INJECTION, SUSPENSION INTRA-ARTICULAR; INTRAMUSCULAR
Status: DISCONTINUED | OUTPATIENT
Start: 2019-12-16 | End: 2019-12-16 | Stop reason: HOSPADM

## 2019-12-16 RX ADMIN — TRIAMCINOLONE ACETONIDE 80 MG: 40 INJECTION, SUSPENSION INTRA-ARTICULAR; INTRAMUSCULAR at 09:12

## 2019-12-16 NOTE — Clinical Note
December 16, 2019      Terrie Hartmann MD  [address] [facility] [address]           [facility]  [address] [place] [address]  Phone: [phone]          Patient: Felicia Drew   MR Number: [id number]   YOB: 1970   Date of Visit: 12/16/2019       Dear Dr. Terrie Hartmann:    Thank you for referring Felicia Drew to me for evaluation. Attached you will find relevant portions of my assessment and plan of care.    If you have questions, please do not hesitate to call me. I look forward to following Felicia Drew along with you.    Sincerely,    Christiano Belle MD    Enclosure  CC:  No Recipients    If you would like to receive this communication electronically, please contact [email or web address] or [phone] to request more information on [employer] Link access.    For providers and/or their staff who would like to refer a patient to [facility], please contact us through our one-stop-shop provider referral line, [facility], at [phone].    If you feel you have received this communication in error or would no longer like to receive these types of communications, please e-mail [email or web address]

## 2019-12-16 NOTE — PROCEDURES
Large Joint Aspiration/Injection: L subacromial bursa  Date/Time: 12/16/2019 9:00 AM  Performed by: Christiano Belle MD  Authorized by: Christiano Belle MD     Consent Done?:  Yes (Verbal)  Indications:  Pain  Procedure site marked: Yes    Timeout: Prior to procedure the correct patient, procedure, and site was verified      Location:  Shoulder  Site:  L subacromial bursa  Prep: Patient was prepped and draped in usual sterile fashion    Needle size:  21 G  Ultrasonic Guidance for needle placement: No  Approach:  Posterior  Medications:  80 mg triamcinolone acetonide 40 mg/mL  Patient tolerance:  Patient tolerated the procedure well with no immediate complications

## 2019-12-16 NOTE — PROGRESS NOTES
HISTORY OF PRESENT ILLNESS:  A 49 year old, left shoulder pain for six months'   time.  Feels that it started after she had an injection for shingles.  Pain   putting her arm up over her head, reaching behind her back.  She has waited for   several months, has not gotten better.  The injection was six months ago.    PHYSICAL EXAMINATION:  Today shows cervical motion does not reproduce her   symptoms.  She has positive Neer and Frias impingement sign.  Cuff strength is   intact, but weak and painful.  Skin is intact, well perfused distally.    X-rays show AC arthrosis.    ASSESSMENT:  Cuff tendinitis.    PLAN:  Kenalog injection into the subacromial space of the left shoulder, home   exercise program.  Follow up as needed.        PBB/HN  dd: 2019 10:41:53 (CST)  td: 2019 06:59:01 (CST)  Doc ID   #[id number]  Job ID #[id number]    CC:     Further History  Aching pain  Worse with activity  Relieved with rest  No other associated symptoms  No other radiation    Further Exam  Alert and oriented  Pleasant  Contralateral limb has appropriate range of motion for age and condition  Contralateral limb has appropriate strength for age and condition  Contralateral limb has appropriate stability  for age and condition  No adenopathy  Pulses are appropriate for current condition  Skin is intact        Chief Complaint    Chief Complaint   Patient presents with    Left Shoulder - Pain       HPI  Felicia Drew is a 49 y.o.  female who presents with       Past Medical History  Past Medical History:   Diagnosis Date    H/O hysterectomy for benign disease with BSO 2017    Morbid obesity     Varicose veins     Vitamin D deficiency        Past Surgical History  Past Surgical History:   Procedure Laterality Date     SECTION      CHOLECYSTECTOMY      GASTRIC BYPASS      ZACK FILTER PLACEMENT      zack filter removal      HYSTERECTOMY      DANNA with BSO;    lap band removal        LAPAROSCOPIC GASTRIC BANDING      OOPHORECTOMY      w/hysteretomy @ age 41    TONSILLECTOMY      VASCULAR SURGERY         Medications  Current Outpatient Medications   Medication Sig    acetaminophen (TYLENOL) 500 MG tablet Take 1 tablet (500 mg total) by mouth every 6 (six) hours as needed for Pain.    amoxicillin (AMOXIL) 875 MG tablet Take 1 tablet (875 mg total) by mouth 2 (two) times daily. for 10 days    CALCIUM CITRATE/VITAMIN D3 (CALCIUM CITRATE + D ORAL) Take by mouth 3 (three) times daily. Pt takes 1000mg tid    cyanocobalamin, vitamin B-12, 2,500 mcg Subl Place 1 tablet under the tongue once a week.    docusate sodium (COLACE) 100 MG capsule Take 1 capsule (100 mg total) by mouth 2 (two) times daily.    ergocalciferol (ERGOCALCIFEROL) 50,000 unit Cap Take 1 capsule (50,000 Units total) by mouth twice a week. TAKE 1 CAPSULE (50,000 UNITS TOTAL) BY MOUTH EVERY 7 DAYS. (Patient not taking: Reported on 12/11/2019)    fluticasone propionate (FLONASE) 50 mcg/actuation nasal spray Use 2 sprays to each nostril daily    omeprazole (PRILOSEC) 40 MG capsule TAKE 1 CAPSULE BY MOUTH 2 TIMES DAILY BEFORE MEALS.    promethazine-dextromethorphan (PROMETHAZINE-DM) 6.25-15 mg/5 mL Syrp Take 5 mLs by mouth every 6 (six) hours as needed (cough).     No current facility-administered medications for this visit.        Allergies  Review of patient's allergies indicates:   Allergen Reactions    Erythromycin Rash    Erythromycin (bulk) Rash       Family History  Family History   Problem Relation Age of Onset    Aortic aneurysm Mother     Cancer Father         lung cancer/quit tobacco earlier;    Cancer Brother         melanoma;    COPD Brother         emphysema; quit tobacco;    Drug abuse Brother     Aortic aneurysm Brother     Dementia Brother         hx of hypoxia s/p aspiration PNA    Hyperlipidemia Sister     Hypertension Sister     BAYLEE disease Sister     Rheum arthritis Sister     Aortic aneurysm  Sister     Drug abuse Brother        Social History  Social History     Socioeconomic History    Marital status:      Spouse name: Not on file    Number of children: 2    Years of education: Not on file    Highest education level: Not on file   Occupational History    Not on file   Social Needs    Financial resource strain: Not on file    Food insecurity:     Worry: Not on file     Inability: Not on file    Transportation needs:     Medical: Not on file     Non-medical: Not on file   Tobacco Use    Smoking status: Former Smoker     Last attempt to quit: 2009     Years since quitting: 10.9    Smokeless tobacco: Never Used   Substance and Sexual Activity    Alcohol use: Yes     Comment: socially    Drug use: No    Sexual activity: Yes   Lifestyle    Physical activity:     Days per week: Not on file     Minutes per session: Not on file    Stress: Not on file   Relationships    Social connections:     Talks on phone: Not on file     Gets together: Not on file     Attends Worship service: Not on file     Active member of club or organization: Not on file     Attends meetings of clubs or organizations: Not on file     Relationship status: Not on file   Other Topics Concern    Not on file   Social History Narrative    Not on file               Review of Systems     Constitutional: Negative    HENT: Negative  Eyes: Negative  Respiratory: Negative  Cardiovascular: Negative  Musculoskeletal: HPI  Skin: Negative  Neurological: Negative  Hematological: Negative  Endocrine: Negative                 Physical Exam    There were no vitals filed for this visit.  Body mass index is 51.94 kg/m².  Physical Examination:     General appearance -  well appearing, and in no distress  Mental status - awake  Neck - supple  Chest -  symmetric air entry  Heart - normal rate   Abdomen - soft      Assessment     1. Chronic left shoulder pain          Plan

## 2019-12-19 LAB — VIT B1 BLD-MCNC: 40 UG/L (ref 38–122)

## 2020-01-15 ENCOUNTER — INITIAL CONSULT (OUTPATIENT)
Dept: BARIATRICS | Facility: CLINIC | Age: 50
End: 2020-01-15
Payer: COMMERCIAL

## 2020-01-15 ENCOUNTER — TELEPHONE (OUTPATIENT)
Dept: BARIATRICS | Facility: CLINIC | Age: 50
End: 2020-01-15

## 2020-01-15 VITALS
HEIGHT: 62 IN | HEART RATE: 82 BPM | BODY MASS INDEX: 52.09 KG/M2 | SYSTOLIC BLOOD PRESSURE: 124 MMHG | WEIGHT: 283.06 LBS | DIASTOLIC BLOOD PRESSURE: 82 MMHG

## 2020-01-15 DIAGNOSIS — K91.0 VOMITING (BILIOUS) FOLLOWING GASTROINTESTINAL SURGERY: Primary | ICD-10-CM

## 2020-01-15 DIAGNOSIS — K21.9 GASTROESOPHAGEAL REFLUX DISEASE, ESOPHAGITIS PRESENCE NOT SPECIFIED: ICD-10-CM

## 2020-01-15 DIAGNOSIS — Z83.719 FAMILY HISTORY OF POLYPS IN THE COLON: ICD-10-CM

## 2020-01-15 DIAGNOSIS — Z98.84 HISTORY OF ROUX-EN-Y GASTRIC BYPASS: ICD-10-CM

## 2020-01-15 PROCEDURE — 99204 PR OFFICE/OUTPT VISIT, NEW, LEVL IV, 45-59 MIN: ICD-10-PCS | Mod: S$GLB,,, | Performed by: PHYSICIAN ASSISTANT

## 2020-01-15 PROCEDURE — 99999 PR PBB SHADOW E&M-EST. PATIENT-LVL III: CPT | Mod: PBBFAC,,, | Performed by: PHYSICIAN ASSISTANT

## 2020-01-15 PROCEDURE — 3008F PR BODY MASS INDEX (BMI) DOCUMENTED: ICD-10-PCS | Mod: CPTII,S$GLB,, | Performed by: PHYSICIAN ASSISTANT

## 2020-01-15 PROCEDURE — 99999 PR PBB SHADOW E&M-EST. PATIENT-LVL III: ICD-10-PCS | Mod: PBBFAC,,, | Performed by: PHYSICIAN ASSISTANT

## 2020-01-15 PROCEDURE — 3008F BODY MASS INDEX DOCD: CPT | Mod: CPTII,S$GLB,, | Performed by: PHYSICIAN ASSISTANT

## 2020-01-15 PROCEDURE — 99204 OFFICE O/P NEW MOD 45 MIN: CPT | Mod: S$GLB,,, | Performed by: PHYSICIAN ASSISTANT

## 2020-01-15 RX ORDER — FAMOTIDINE 20 MG/1
20 TABLET, FILM COATED ORAL 2 TIMES DAILY
Qty: 60 TABLET | Refills: 6 | Status: SHIPPED | OUTPATIENT
Start: 2020-01-15 | End: 2021-02-24 | Stop reason: ALTCHOICE

## 2020-01-15 RX ORDER — OMEPRAZOLE 40 MG/1
40 CAPSULE, DELAYED RELEASE ORAL
Qty: 180 CAPSULE | Refills: 0 | Status: SHIPPED | OUTPATIENT
Start: 2020-01-15 | End: 2020-04-21

## 2020-01-15 RX ORDER — SUCRALFATE 1 G/10ML
1 SUSPENSION ORAL
Qty: 3600 ML | Refills: 0 | Status: SHIPPED | OUTPATIENT
Start: 2020-01-15 | End: 2020-04-14

## 2020-01-15 NOTE — PROGRESS NOTES
BARIATRIC NEW PATIENT VISIT:    HISTORY OF PRESENT ILLNESS:  49 y.o. female, Body mass index is 51.77 kg/m².,  presents following a phone call this morning complaining of vomiting yellow bile at night.  She is s/p Band to Audra en Y gastric bypass with Dr. Manriquez on 4/9/15.  She has not been seen since 1 year after her revision on May 6, 2016.   Today she complains of vomiting yellow mucus, projectile in nature this morning. Coming out of the nose and throat.  Prior to this morning, no symptoms for the past 2 weeks.    Has been waking up with acid in the middle of the night for the past month.  Taking Prilosec 20 mg at night for the past month, symptoms have not improved with this regimen.      Reports some intermittent abdominal pain that does not stay in 1 place, never lasts long, never in the same place.      Sleeping on multiple pillows, however does not always stay on her pillows while sleeping.     Last meal of the day typically 6- 7pm.  Bedtime around 10-10:30pm.     States that her Vit D is low, PCP treating with 1000 daily.      She has only had 2 protein shakes and water today without any further difficulty.      She has some throat irritation and nasal symptoms with clear discharge.      Denies: NSAID use, nausea, abdominal pain, changes in bowel movement pattern, black stool, maroon stool, bloody stool, fever, chills, dysphagia, and chest pain.    Review of Systems   Constitutional: Negative for chills, fever and malaise/fatigue.   Respiratory: Negative for cough, hemoptysis and shortness of breath.    Cardiovascular: Negative for chest pain, palpitations and leg swelling.   Gastrointestinal: Negative for abdominal pain, blood in stool, constipation, diarrhea, heartburn, melena, nausea and vomiting.   Genitourinary: Negative for dysuria and hematuria.   Skin: Negative for rash.   Neurological: Negative for weakness and headaches.   Psychiatric/Behavioral: Negative for depression.     EXERCISE &  VITAMINS:  See Bariatric Assessment    MEDICATIONS/ALLERGIES:  Have been reviewed.    DIET:  Regular Bariatric Diet.  ~50-60 grams daily protein.      Physical Exam   Constitutional: She is oriented to person, place, and time. She appears well-developed and well-nourished.   Cardiovascular: Normal rate and regular rhythm.   Pulmonary/Chest: Effort normal and breath sounds normal. No respiratory distress. She has no wheezes. She has no rales. She exhibits no tenderness.   Abdominal: Soft. Bowel sounds are normal. She exhibits no distension and no mass. There is no tenderness. There is no rebound and no guarding. No hernia.   WHSS   Musculoskeletal: She exhibits no edema.   Neurological: She is alert and oriented to person, place, and time.   Skin: Skin is warm and dry. No rash noted. No erythema. No pallor.   Psychiatric: She has a normal mood and affect.   Nursing note and vitals reviewed.      ASSESSMENT:  - Morbid obesity, Body mass index is 51.77 kg/m²., s/p band to laparoscopic Audra-en-Y on 4/9/15.  - Co-morbidities: none  - Poor Weight loss, 24% EWL (48 lbs)  - No Exercise regimen  - Good Vitamin Regimen  - Fair Diet, low daily protein intake.  - Reviewed 12/2019 labs by Dr Hartmann: continue current Vit D daily 1000 IU.   - Family history of colon polyps.     PLAN:  - PPI BID, H2 QHS, and Carafate QID for at least 6-8 weeks, then may start to taper down to Omeprazole 40 QD only: Rx sent to pharmacy.   - EGD.  - Screening colonoscopy, brothers with hx of colon polyps.  - Continue daily vitamins and medications.  - Miralax daily for constipation, no fiber.    - No NSAIDs, Tylenol for pain.  - Patient to keep food diary until next appointment, she opts to use her phone to complete this.   - Advised her to ensure that her last meal before bedtime is at least 3 hours before lying flat.  - RTC after above to review results or sooner if needed.  - RD consult at next appointment with food diary.    - Call the office for  any issues.    40 minute visit, over 50% of time spent counseling patient face to face on diet, exercise, and weight loss.

## 2020-01-15 NOTE — PATIENT INSTRUCTIONS
Menu Plan: 800-1000 Calories;  grams of Protein    DAY 1     Breakfast  ½ cup 2% cottage cheese  ¼ cup fruit (no sugar added)    Snack  2% mozzarella string cheese  10 grapes    Lunch  2oz Lean hamburger or turkey israel  1 slice low-fat cheese  ¼ cup green beans    Snack  200 calorie low-carb protein drink (4 grams sugar or less)    Dinner  2oz chicken thigh  ¼ cup cooked spinach     Snack  Atkins bar (15g protein)      DAY 2    Breakfast  1 egg with 1oz shredded cheddar cheese and 2T salsa    Snack  200 calorie low-carb protein drink (4 grams sugar or less)    Lunch  Lettuce Wraps: 2oz sliced turkey, 1 slice low-fat Swiss cheese, tomato, and mustard wrapped in a Shankar lettuce leaf    Snack  ½ cup low-fat cottage cheese  Pear cup (no sugar added)    Dinner  2oz baked fish  ½ cup cooked broccoli    Snack  Sugar-free pudding cup      DAY 3    Breakfast   ½ cup low-fat ricotta cheese w/ Splenda to elgin  ½ scoop Vanilla protein powder   ¼ cup fresh fruit    Snack  2% string cheese  6 unsalted almonds    Lunch  Tuna/Chicken Salad: 2oz canned tuna/chicken, 1 egg white, and 1 tsp light callahan  Pineapple cup (no sugar added)    Snack  200 calorie low-carb protein drink (4 grams sugar or less)    Dinner  ½ baked pork chop   ¼ cup beans      DAY 4    Breakfast  200 calorie low-carb protein drink (4 grams sugar or less)    Snack  Boiled egg    Lunch  ½ cup grilled shrimp  Salad w/ 2 tbsp crumbled fat-free feta  1 tbsp light vinaigrette    Snack  200 calorie low-carb protein drink (4 grams sugar or less)    Dinner  ¾ cup red beans    Snack  Mini Babybell light      DAY 5    Breakfast  Key Lime pie: 3oz Greek yogurt, 1 tbsp Splenda, ½ individual pack Crystal Light lemonade. Top with ¼ cup chopped walnuts     Snack  3-4 lean ham or turkey slices, ¼ - ½ cup fruit    Lunch  Fiesta Chicken: 2oz canned chicken, 1oz shredded cheddar cheese, ¼ cup black beans  Top with 2 tbsp salsa and a small dollop light sour  cream    Snack  200 calorie low-carb protein drink (4 grams sugar or less)    Dinner  Omelette: ¼ cup Egg Beaters, 4 large (1oz) shrimp, 1oz shredded low-fat cheese. Add bell pepper, onion, mushrooms, green onions, or salsa, optional.      DAY 6    Breakfast  1 ashley or 2 links turkey sausage  ½ cup fruit    Snack  200 calorie low-carb protein drink (4 grams sugar or less)    Lunch  Grilled tilapia  Salad of baby spinach leaves with light dressing    Snack  200 calorie low-carb protein drink (4 grams sugar or less)    Dinner  Chicken thigh simmered in 98% fat free cream of mushroom soup  ½ cup cooked green beans

## 2020-01-15 NOTE — TELEPHONE ENCOUNTER
----- Message from Ayanna Taylor sent at 1/15/2020  9:05 AM CST -----  Contact: self  Pt is calling stating that she's having some problems going on with her stomach and would like for someone from the offices to contact her cause she is try to see if she need to talk to  or some one else pt is asking for a call back at [phone]

## 2020-01-15 NOTE — TELEPHONE ENCOUNTER
Phoned patient. Patient states she has been waking up at night for the past 1-2 weeks, approximately every other night due to yellow bile in her throat. She reports throwing up or spitting it out. Patient states it happened last night. She states she was taking prilosec in the morning, but changed it to night to try to help. She reports bright yellow projectile vomit this morning. Appointment scheduled with JOANIE this afternoon.

## 2020-01-20 DIAGNOSIS — Z12.11 SPECIAL SCREENING FOR MALIGNANT NEOPLASMS, COLON: Primary | ICD-10-CM

## 2020-01-20 RX ORDER — POLYETHYLENE GLYCOL 3350, SODIUM SULFATE ANHYDROUS, SODIUM BICARBONATE, SODIUM CHLORIDE, POTASSIUM CHLORIDE 236; 22.74; 6.74; 5.86; 2.97 G/4L; G/4L; G/4L; G/4L; G/4L
4 POWDER, FOR SOLUTION ORAL ONCE
Qty: 4000 ML | Refills: 0 | Status: SHIPPED | OUTPATIENT
Start: 2020-01-20 | End: 2020-01-20

## 2020-02-24 RX ORDER — NYSTATIN 100000 [USP'U]/G
POWDER TOPICAL
Qty: 60 G | Refills: 0 | Status: SHIPPED | OUTPATIENT
Start: 2020-02-24 | End: 2020-04-13

## 2020-03-16 PROBLEM — J01.00 ACUTE NON-RECURRENT MAXILLARY SINUSITIS: Status: RESOLVED | Noted: 2019-12-11 | Resolved: 2020-03-16

## 2020-03-16 PROBLEM — Z00.00 ENCOUNTER FOR ANNUAL HEALTH EXAMINATION: Status: RESOLVED | Noted: 2019-12-11 | Resolved: 2020-03-16

## 2020-04-09 DIAGNOSIS — K91.0 VOMITING (BILIOUS) FOLLOWING GASTROINTESTINAL SURGERY: ICD-10-CM

## 2020-04-09 DIAGNOSIS — K21.9 GASTROESOPHAGEAL REFLUX DISEASE, ESOPHAGITIS PRESENCE NOT SPECIFIED: ICD-10-CM

## 2020-04-09 DIAGNOSIS — Z98.84 HISTORY OF ROUX-EN-Y GASTRIC BYPASS: ICD-10-CM

## 2020-04-13 RX ORDER — NYSTATIN 100000 [USP'U]/G
POWDER TOPICAL
Qty: 60 G | Refills: 0 | Status: SHIPPED | OUTPATIENT
Start: 2020-04-13 | End: 2021-02-24 | Stop reason: SDUPTHER

## 2020-04-21 RX ORDER — OMEPRAZOLE 40 MG/1
CAPSULE, DELAYED RELEASE ORAL
Qty: 180 CAPSULE | Refills: 0 | Status: SHIPPED | OUTPATIENT
Start: 2020-04-21 | End: 2020-07-14

## 2020-05-06 DIAGNOSIS — Z83.719 FAMILY HISTORY OF POLYPS IN THE COLON: Primary | ICD-10-CM

## 2020-05-08 ENCOUNTER — TELEPHONE (OUTPATIENT)
Dept: ENDOSCOPY | Facility: HOSPITAL | Age: 50
End: 2020-05-08

## 2020-05-20 ENCOUNTER — TELEPHONE (OUTPATIENT)
Dept: ENDOSCOPY | Facility: HOSPITAL | Age: 50
End: 2020-05-20

## 2020-05-20 RX ORDER — NYSTATIN 100000 [USP'U]/G
POWDER TOPICAL
Qty: 60 G | Refills: 0 | OUTPATIENT
Start: 2020-05-20

## 2020-06-16 ENCOUNTER — LAB VISIT (OUTPATIENT)
Dept: PRIMARY CARE CLINIC | Facility: CLINIC | Age: 50
End: 2020-06-16
Payer: COMMERCIAL

## 2020-06-16 DIAGNOSIS — Z83.719 FAMILY HISTORY OF POLYPS IN THE COLON: ICD-10-CM

## 2020-06-16 PROCEDURE — U0003 INFECTIOUS AGENT DETECTION BY NUCLEIC ACID (DNA OR RNA); SEVERE ACUTE RESPIRATORY SYNDROME CORONAVIRUS 2 (SARS-COV-2) (CORONAVIRUS DISEASE [COVID-19]), AMPLIFIED PROBE TECHNIQUE, MAKING USE OF HIGH THROUGHPUT TECHNOLOGIES AS DESCRIBED BY CMS-2020-01-R: HCPCS

## 2020-06-17 LAB — SARS-COV-2 RNA RESP QL NAA+PROBE: NOT DETECTED

## 2020-06-18 ENCOUNTER — ANESTHESIA EVENT (OUTPATIENT)
Dept: ENDOSCOPY | Facility: HOSPITAL | Age: 50
End: 2020-06-18
Payer: COMMERCIAL

## 2020-06-18 ENCOUNTER — ANESTHESIA (OUTPATIENT)
Dept: ENDOSCOPY | Facility: HOSPITAL | Age: 50
End: 2020-06-18
Payer: COMMERCIAL

## 2020-06-18 ENCOUNTER — HOSPITAL ENCOUNTER (OUTPATIENT)
Facility: HOSPITAL | Age: 50
Discharge: HOME | End: 2020-06-18
Attending: INTERNAL MEDICINE | Admitting: INTERNAL MEDICINE
Payer: COMMERCIAL

## 2020-06-18 VITALS
SYSTOLIC BLOOD PRESSURE: 130 MMHG | OXYGEN SATURATION: 100 % | BODY MASS INDEX: 51.34 KG/M2 | HEART RATE: 64 BPM | RESPIRATION RATE: 17 BRPM | TEMPERATURE: 98 F | HEIGHT: 62 IN | WEIGHT: 279 LBS | DIASTOLIC BLOOD PRESSURE: 71 MMHG

## 2020-06-18 DIAGNOSIS — R10.10 UPPER ABDOMINAL PAIN: ICD-10-CM

## 2020-06-18 DIAGNOSIS — Z98.84 S/P BARIATRIC SURGERY: ICD-10-CM

## 2020-06-18 DIAGNOSIS — Z12.11 COLON CANCER SCREENING: Primary | ICD-10-CM

## 2020-06-18 DIAGNOSIS — K21.9 GERD (GASTROESOPHAGEAL REFLUX DISEASE): ICD-10-CM

## 2020-06-18 PROCEDURE — D9220A PRA ANESTHESIA: Mod: ANES,,, | Performed by: ANESTHESIOLOGY

## 2020-06-18 PROCEDURE — 88305 TISSUE EXAM BY PATHOLOGIST: CPT | Performed by: PATHOLOGY

## 2020-06-18 PROCEDURE — D9220A PRA ANESTHESIA: ICD-10-PCS | Mod: ANES,,, | Performed by: ANESTHESIOLOGY

## 2020-06-18 PROCEDURE — 25000003 PHARM REV CODE 250: Performed by: INTERNAL MEDICINE

## 2020-06-18 PROCEDURE — 43235 EGD DIAGNOSTIC BRUSH WASH: CPT | Performed by: INTERNAL MEDICINE

## 2020-06-18 PROCEDURE — 43235 PR EGD, FLEX, DIAGNOSTIC: ICD-10-PCS | Mod: 51,,, | Performed by: INTERNAL MEDICINE

## 2020-06-18 PROCEDURE — 45385 COLONOSCOPY W/LESION REMOVAL: CPT | Performed by: INTERNAL MEDICINE

## 2020-06-18 PROCEDURE — 37000008 HC ANESTHESIA 1ST 15 MINUTES: Performed by: INTERNAL MEDICINE

## 2020-06-18 PROCEDURE — D9220A PRA ANESTHESIA: Mod: CRNA,,, | Performed by: NURSE ANESTHETIST, CERTIFIED REGISTERED

## 2020-06-18 PROCEDURE — 43235 EGD DIAGNOSTIC BRUSH WASH: CPT | Mod: 51,,, | Performed by: INTERNAL MEDICINE

## 2020-06-18 PROCEDURE — 88305 TISSUE EXAM BY PATHOLOGIST: CPT | Mod: 26,,, | Performed by: PATHOLOGY

## 2020-06-18 PROCEDURE — 94761 N-INVAS EAR/PLS OXIMETRY MLT: CPT

## 2020-06-18 PROCEDURE — 45385 PR COLONOSCOPY,REMV LESN,SNARE: ICD-10-PCS | Mod: 33,,, | Performed by: INTERNAL MEDICINE

## 2020-06-18 PROCEDURE — 27000221 HC OXYGEN, UP TO 24 HOURS

## 2020-06-18 PROCEDURE — 37000009 HC ANESTHESIA EA ADD 15 MINS: Performed by: INTERNAL MEDICINE

## 2020-06-18 PROCEDURE — D9220A PRA ANESTHESIA: ICD-10-PCS | Mod: CRNA,,, | Performed by: NURSE ANESTHETIST, CERTIFIED REGISTERED

## 2020-06-18 PROCEDURE — 27201089 HC SNARE, DISP (ANY): Performed by: INTERNAL MEDICINE

## 2020-06-18 PROCEDURE — 45385 COLONOSCOPY W/LESION REMOVAL: CPT | Mod: 33,,, | Performed by: INTERNAL MEDICINE

## 2020-06-18 PROCEDURE — 63600175 PHARM REV CODE 636 W HCPCS: Performed by: NURSE ANESTHETIST, CERTIFIED REGISTERED

## 2020-06-18 PROCEDURE — 88305 TISSUE EXAM BY PATHOLOGIST: ICD-10-PCS | Mod: 26,,, | Performed by: PATHOLOGY

## 2020-06-18 RX ORDER — LIDOCAINE HYDROCHLORIDE 20 MG/ML
INJECTION INTRAVENOUS
Status: DISCONTINUED | OUTPATIENT
Start: 2020-06-18 | End: 2020-06-18

## 2020-06-18 RX ORDER — PROPOFOL 10 MG/ML
VIAL (ML) INTRAVENOUS CONTINUOUS PRN
Status: DISCONTINUED | OUTPATIENT
Start: 2020-06-18 | End: 2020-06-18

## 2020-06-18 RX ORDER — SODIUM CHLORIDE 9 MG/ML
INJECTION, SOLUTION INTRAVENOUS CONTINUOUS
Status: DISCONTINUED | OUTPATIENT
Start: 2020-06-18 | End: 2020-06-18 | Stop reason: HOSPADM

## 2020-06-18 RX ORDER — ONDANSETRON 2 MG/ML
INJECTION INTRAMUSCULAR; INTRAVENOUS
Status: DISCONTINUED | OUTPATIENT
Start: 2020-06-18 | End: 2020-06-18

## 2020-06-18 RX ORDER — MIDAZOLAM HYDROCHLORIDE 1 MG/ML
INJECTION, SOLUTION INTRAMUSCULAR; INTRAVENOUS
Status: DISCONTINUED | OUTPATIENT
Start: 2020-06-18 | End: 2020-06-18

## 2020-06-18 RX ORDER — FENTANYL CITRATE 50 UG/ML
INJECTION, SOLUTION INTRAMUSCULAR; INTRAVENOUS
Status: DISCONTINUED | OUTPATIENT
Start: 2020-06-18 | End: 2020-06-18

## 2020-06-18 RX ORDER — PROPOFOL 10 MG/ML
VIAL (ML) INTRAVENOUS
Status: DISCONTINUED | OUTPATIENT
Start: 2020-06-18 | End: 2020-06-18

## 2020-06-18 RX ADMIN — FENTANYL CITRATE 25 MCG: 50 INJECTION, SOLUTION INTRAMUSCULAR; INTRAVENOUS at 08:06

## 2020-06-18 RX ADMIN — SODIUM CHLORIDE: 0.9 INJECTION, SOLUTION INTRAVENOUS at 07:06

## 2020-06-18 RX ADMIN — PROPOFOL 50 MG: 10 INJECTION, EMULSION INTRAVENOUS at 08:06

## 2020-06-18 RX ADMIN — PROPOFOL 10 MG: 10 INJECTION, EMULSION INTRAVENOUS at 08:06

## 2020-06-18 RX ADMIN — ONDANSETRON 4 MG: 2 INJECTION INTRAMUSCULAR; INTRAVENOUS at 07:06

## 2020-06-18 RX ADMIN — PROPOFOL 20 MG: 10 INJECTION, EMULSION INTRAVENOUS at 07:06

## 2020-06-18 RX ADMIN — MIDAZOLAM HYDROCHLORIDE 2 MG: 1 INJECTION, SOLUTION INTRAMUSCULAR; INTRAVENOUS at 07:06

## 2020-06-18 RX ADMIN — PROPOFOL 70 MG: 10 INJECTION, EMULSION INTRAVENOUS at 07:06

## 2020-06-18 RX ADMIN — FENTANYL CITRATE 50 MCG: 50 INJECTION, SOLUTION INTRAMUSCULAR; INTRAVENOUS at 07:06

## 2020-06-18 RX ADMIN — PROPOFOL 175 MCG/KG/MIN: 10 INJECTION, EMULSION INTRAVENOUS at 07:06

## 2020-06-18 RX ADMIN — LIDOCAINE HYDROCHLORIDE 100 MG: 20 INJECTION, SOLUTION INTRAVENOUS at 07:06

## 2020-06-18 NOTE — PROVATION PATIENT INSTRUCTIONS
Discharge Summary/Instructions after an Endoscopic Procedure  Patient Name: Felicia Drew  Patient MRN: [id number]  Patient YOB: 1970 Thursday, June 18, 2020  Sean Ward MD  RESTRICTIONS:  During your procedure today, you received medications for sedation.  These   medications may affect your judgment, balance and coordination.  Therefore,   for 24 hours, you have the following restrictions:   - DO NOT drive a car, operate machinery, make legal/financial decisions,   sign important papers or drink alcohol.    ACTIVITY:  Today: no heavy lifting, straining or running due to procedural   sedation/anesthesia.  The following day: return to full activity including work.  DIET:  Eat and drink normally unless instructed otherwise.     TREATMENT FOR COMMON SIDE EFFECTS:  - Mild abdominal pain, nausea, belching, bloating or excessive gas:  rest,   eat lightly and use a heating pad.  - Sore Throat: treat with throat lozenges and/or gargle with warm salt   water.  - Because air was used during the procedure, expelling large amounts of air   from your rectum or belching is normal.  - If a bowel prep was taken, you may not have a bowel movement for 1-3 days.    This is normal.  SYMPTOMS TO WATCH FOR AND REPORT TO YOUR PHYSICIAN:  1. Abdominal pain or bloating, other than gas cramps.  2. Chest pain.  3. Back pain.  4. Signs of infection such as: chills or fever occurring within 24 hours   after the procedure.  5. Rectal bleeding, which would show as bright red, maroon, or black stools.   (A tablespoon of blood from the rectum is not serious, especially if   hemorrhoids are present.)  6. Vomiting.  7. Weakness or dizziness.  GO DIRECTLY TO THE NEAREST EMERGENCY ROOM IF YOU HAVE ANY OF THE FOLLOWING:      Difficulty breathing              Chills and/or fever over 101 F   Persistent vomiting and/or vomiting blood   Severe abdominal pain   Severe chest pain   Black, tarry stools   Bleeding- more than one  tablespoon   Any other symptom or condition that you feel may need urgent attention  Your doctor recommends these additional instructions:  If any biopsies were taken, your doctors clinic will contact you in 1 to 2   weeks with any results.  - Patient has a contact number available for emergencies.  The signs and   symptoms of potential delayed complications were discussed with the   patient.  Return to normal activities tomorrow.  Written discharge   instructions were provided to the patient.   - Discharge patient to home.   - Resume previous diet.   - Continue present medications.   For questions, problems or results please call your physician - Sean Ward MD at Work:  [phone].  [facility], EMERGENCY ROOM PHONE NUMBER: [phone]  IF A COMPLICATION OR EMERGENCY SITUATION ARISES AND YOU ARE UNABLE TO REACH   YOUR PHYSICIAN - GO DIRECTLY TO THE EMERGENCY ROOM.  Sean Ward MD  6/18/2020 8:08:44 AM  This report has been verified and signed electronically.  [employer]

## 2020-06-18 NOTE — ANESTHESIA PREPROCEDURE EVALUATION
06/18/2020  Felicia Drew is a 50 y.o., female.    Anesthesia Evaluation          Review of Systems  Anesthesia Hx:  No problems with previous Anesthesia   Social:  Former Smoker, Alcohol Use    Hematology/Oncology:        Hematology Comments: History of DVT   Cardiovascular:   Venous insufficiency   Hepatic/GI:   S/p Gastric bypass,  Family history of polyps in the colon        Physical Exam  General:  Morbid Obesity    Airway/Jaw/Neck:  Airway Findings: Mouth Opening: Normal Tongue: Normal  General Airway Assessment: Adult            Mental Status:  Mental Status Findings:  Alert and Oriented, Cooperative         Anesthesia Plan  Type of Anesthesia, risks & benefits discussed:  Anesthesia Type:  general  Patient's Preference:   Intra-op Monitoring Plan: standard ASA monitors  Intra-op Monitoring Plan Comments:   Post Op Pain Control Plan:   Post Op Pain Control Plan Comments:   Induction:   IV  Beta Blocker:  Patient is not currently on a Beta-Blocker (No further documentation required).       Informed Consent: Patient understands risks and agrees with Anesthesia plan.  Questions answered. Anesthesia consent signed with patient.  ASA Score: 3     Day of Surgery Review of History & Physical:    H&P update referred to the provider.         Ready For Surgery From Anesthesia Perspective.

## 2020-06-18 NOTE — ANESTHESIA POSTPROCEDURE EVALUATION
Anesthesia Post Evaluation    Patient: Felicia Drew    Procedure(s) Performed: Procedure(s) (LRB):  ESOPHAGOGASTRODUODENOSCOPY (EGD) (N/A)  COLONOSCOPY (N/A)    Final Anesthesia Type: general    Patient location during evaluation: PACU  Patient participation: Yes- Able to Participate  Level of consciousness: awake and alert  Post-procedure vital signs: reviewed and stable  Pain management: adequate  Airway patency: patent    PONV status at discharge: No PONV  Anesthetic complications: no      Cardiovascular status: blood pressure returned to baseline  Respiratory status: unassisted  Hydration status: euvolemic  Follow-up not needed.          Vitals Value Taken Time   /71 06/18/20 0916   Temp 36.7 °C (98.1 °F) 06/18/20 0900   Pulse 65 06/18/20 0920   Resp 53 06/18/20 0920   SpO2 100 % 06/18/20 0920   Vitals shown include unvalidated device data.      No case tracking events are documented in the log.      Pain/Prua Score: Pura Score: 10 (6/18/2020  9:15 AM)

## 2020-06-18 NOTE — PLAN OF CARE
Pt discharged to home.  Discharge instructions given, pt stated understanding.  IV removed.  Pt left via wheelchair with  to home.

## 2020-06-18 NOTE — H&P
Short Stay Endoscopy History and Physical    PCP - Terrie Hartmann MD    Procedure - EGD/Colonoscopy  Sedation: GA  ASA - per anesthesia  Mallampati - per anesthesia  History of Anesthesia problems - no  Family history Anesthesia problems -  no     HPI:  This is a 50 y.o. female here for evaluation of : s/p RYGB, GERD, Upper abdominal pain, Screening for CRC    Reflux - yes  Dysphagia - no  Abdominal pain - yes  Diarrhea - no    ROS:  Constitutional: No fevers, chills, No weight loss  ENT: No allergies  CV: No chest pain  Pulm: No cough, No shortness of breath  Ophtho: No vision changes  GI: see HPI  Medical History:  has a past medical history of H/O hysterectomy for benign disease with BSO (2017), Morbid obesity, Varicose veins, and Vitamin D deficiency.    Surgical History:  has a past surgical history that includes Laparoscopic gastric banding;  section; Cholecystectomy; Tonsillectomy; Vascular surgery; victoriano filter placement; Gastric bypass; lap band removal ; Hysterectomy; Oophorectomy; and victoriano filter removal.    Family History: family history includes Aortic aneurysm in her brother, mother, and sister; COPD in her brother; Cancer in her brother and father; Dementia in her brother; Drug abuse in her brother and brother; BAYLEE disease in her sister; Hyperlipidemia in her sister; Hypertension in her sister; Rheum arthritis in her sister.. Otherwise no colon cancer, inflammatory bowel disease, or GI malignancies.    Social History:  reports that she quit smoking about 11 years ago. She has never used smokeless tobacco. She reports current alcohol use. She reports that she does not use drugs.    Review of patient's allergies indicates:   Allergen Reactions    Erythromycin Rash    Erythromycin (bulk) Rash       Medications:   Medications Prior to Admission   Medication Sig Dispense Refill Last Dose    acetaminophen (TYLENOL) 500 MG tablet Take 1 tablet (500 mg total) by mouth every 6 (six)  hours as needed for Pain.  0     cyanocobalamin, vitamin B-12, 2,500 mcg Subl Place 1 tablet under the tongue once a week.       docusate sodium (COLACE) 100 MG capsule Take 1 capsule (100 mg total) by mouth 2 (two) times daily.  0     famotidine (PEPCID AC) 20 MG tablet Take 1 tablet (20 mg total) by mouth 2 (two) times daily. 60 tablet 6     fluticasone propionate (FLONASE) 50 mcg/actuation nasal spray Use 2 sprays to each nostril daily 16 g 12     multivitamin capsule Take 3 capsules by mouth 3 (three) times daily.       NYSTOP powder APPLY TO AFFECTED AREA 3 TIMES A DAY 60 g 0     omeprazole (PRILOSEC) 40 MG capsule TAKE 1 CAPSULE BY MOUTH 2 TIMES DAILY. (30 MINUTES BEFORE BREAKFAST& 30 MINUTES BEFORE EVENING MEAL) 180 capsule 0        Objective Findings:    Vital Signs: Per nursing notes.    Physical Exam:  General Appearance: Well appearing in no acute distress  Head:   Normocephalic, without obvious abnormality  Eyes:    No scleral icterus  Airway: Open  Neck: No restriction in mobility  Lungs: CTA bilaterally in anterior and posterior fields, no wheezes, no crackles.  Heart:  Regular rate and rhythm, S1, S2 normal, no murmurs heard  Abdomen: Soft, non tender, non distended      Labs:  Lab Results   Component Value Date    WBC 5.74 12/12/2019    HGB 13.8 12/12/2019    HCT 44.1 12/12/2019     12/12/2019    CHOL 160 12/12/2019    TRIG 110 12/12/2019    HDL 60 12/12/2019    ALT 13 12/12/2019    AST 15 12/12/2019     12/12/2019    K 3.8 12/12/2019     12/12/2019    CREATININE 0.7 12/12/2019    BUN 11 12/12/2019    CO2 28 12/12/2019    TSH 1.480 12/29/2018    HGBA1C 5.2 12/12/2019         I have explained the risks and benefits of endoscopy procedures to the patient including but not limited to bleeding, perforation, infection, and death.    Thank you so much for allowing me to participate in the care of Felicia ChayoSadi Ward MD

## 2020-06-18 NOTE — TRANSFER OF CARE
"Anesthesia Transfer of Care Note    Patient: Felicia Drew    Procedure(s) Performed: Procedure(s) (LRB):  ESOPHAGOGASTRODUODENOSCOPY (EGD) (N/A)  COLONOSCOPY (N/A)    Patient location: PACU    Anesthesia Type: general    Transport from OR: Transported from OR on 2-3 L/min O2 by NC with adequate spontaneous ventilation    Post pain: adequate analgesia    Post assessment: no apparent anesthetic complications and tolerated procedure well    Post vital signs: stable    Level of consciousness: awake    Nausea/Vomiting: no nausea/vomiting    Complications: none    Transfer of care protocol was followed      Last vitals:   Visit Vitals  /73 (BP Location: Left arm, Patient Position: Lying)   Pulse 70   Temp 36.6 °C (97.9 °F) (Temporal)   Resp 16   Ht 5' 2" (1.575 m)   Wt 126.6 kg (279 lb)   SpO2 98%   Breastfeeding No   BMI 51.03 kg/m²     "

## 2020-06-18 NOTE — PROVATION PATIENT INSTRUCTIONS
Discharge Summary/Instructions after an Endoscopic Procedure  Patient Name: Felicia Drew  Patient MRN: [id number]  Patient YOB: 1970 Thursday, June 18, 2020  Sean Ward MD  RESTRICTIONS:  During your procedure today, you received medications for sedation.  These   medications may affect your judgment, balance and coordination.  Therefore,   for 24 hours, you have the following restrictions:   - DO NOT drive a car, operate machinery, make legal/financial decisions,   sign important papers or drink alcohol.    ACTIVITY:  Today: no heavy lifting, straining or running due to procedural   sedation/anesthesia.  The following day: return to full activity including work.  DIET:  Eat and drink normally unless instructed otherwise.     TREATMENT FOR COMMON SIDE EFFECTS:  - Mild abdominal pain, nausea, belching, bloating or excessive gas:  rest,   eat lightly and use a heating pad.  - Sore Throat: treat with throat lozenges and/or gargle with warm salt   water.  - Because air was used during the procedure, expelling large amounts of air   from your rectum or belching is normal.  - If a bowel prep was taken, you may not have a bowel movement for 1-3 days.    This is normal.  SYMPTOMS TO WATCH FOR AND REPORT TO YOUR PHYSICIAN:  1. Abdominal pain or bloating, other than gas cramps.  2. Chest pain.  3. Back pain.  4. Signs of infection such as: chills or fever occurring within 24 hours   after the procedure.  5. Rectal bleeding, which would show as bright red, maroon, or black stools.   (A tablespoon of blood from the rectum is not serious, especially if   hemorrhoids are present.)  6. Vomiting.  7. Weakness or dizziness.  GO DIRECTLY TO THE NEAREST EMERGENCY ROOM IF YOU HAVE ANY OF THE FOLLOWING:      Difficulty breathing              Chills and/or fever over 101 F   Persistent vomiting and/or vomiting blood   Severe abdominal pain   Severe chest pain   Black, tarry stools   Bleeding- more than one  tablespoon   Any other symptom or condition that you feel may need urgent attention  Your doctor recommends these additional instructions:  If any biopsies were taken, your doctors clinic will contact you in 1 to 2   weeks with any results.  - Patient has a contact number available for emergencies.  The signs and   symptoms of potential delayed complications were discussed with the   patient.  Return to normal activities tomorrow.  Written discharge   instructions were provided to the patient.   - Discharge patient to home.   - Resume previous diet.   - Continue present medications.   - Await pathology results.   - Repeat colonoscopy in 5 years for surveillance.   For questions, problems or results please call your physician - Sean Ward MD at Work:  [phone].  [facility], EMERGENCY ROOM PHONE NUMBER: [phone]  IF A COMPLICATION OR EMERGENCY SITUATION ARISES AND YOU ARE UNABLE TO REACH   YOUR PHYSICIAN - GO DIRECTLY TO THE EMERGENCY ROOM.  Sean Ward MD  6/18/2020 8:53:12 AM  This report has been verified and signed electronically.  [employer]

## 2020-06-22 LAB
FINAL PATHOLOGIC DIAGNOSIS: NORMAL
GROSS: NORMAL

## 2020-09-04 DIAGNOSIS — Z12.39 BREAST CANCER SCREENING: ICD-10-CM

## 2020-10-05 ENCOUNTER — HOSPITAL ENCOUNTER (OUTPATIENT)
Dept: RADIOLOGY | Facility: HOSPITAL | Age: 50
Discharge: HOME | End: 2020-10-05
Attending: INTERNAL MEDICINE
Payer: COMMERCIAL

## 2020-10-05 VITALS — HEIGHT: 62 IN | BODY MASS INDEX: 51.37 KG/M2 | WEIGHT: 279.13 LBS

## 2020-10-05 DIAGNOSIS — Z12.39 BREAST CANCER SCREENING: ICD-10-CM

## 2020-10-05 PROCEDURE — 77063 MAMMO DIGITAL SCREENING BILAT WITH TOMOSYNTHESIS_CAD: ICD-10-PCS | Mod: 26,,, | Performed by: RADIOLOGY

## 2020-10-05 PROCEDURE — 77067 MAMMO DIGITAL SCREENING BILAT WITH TOMOSYNTHESIS_CAD: ICD-10-PCS | Mod: 26,,, | Performed by: RADIOLOGY

## 2020-10-05 PROCEDURE — 77067 SCR MAMMO BI INCL CAD: CPT | Mod: 26,,, | Performed by: RADIOLOGY

## 2020-10-05 PROCEDURE — 77063 BREAST TOMOSYNTHESIS BI: CPT | Mod: 26,,, | Performed by: RADIOLOGY

## 2020-10-05 PROCEDURE — 77067 SCR MAMMO BI INCL CAD: CPT | Mod: TC,PO

## 2020-10-06 NOTE — PROGRESS NOTES
Normal mammogram, repeat in 1 year, results released through [facility]. Please verify that patient has viewed results. If not, please call patient with interpretation below:    I have reviewed the results of your mammogram and it appears that everything was read as normal.  Based on this, the radiologist has recommended that you recheck a mammogram in 1 year.     Also please see below health maintenance items that are due:    Hepatitis C Screening due on 1970  HIV Screening due on 05/24/1984  Shingles Vaccine(2 of 2) due on 08/13/2019  Mammogram due on 01/03/2020  Influenza Vaccine(1) due on 08/01/2020

## 2020-11-03 ENCOUNTER — PATIENT MESSAGE (OUTPATIENT)
Dept: FAMILY MEDICINE | Facility: CLINIC | Age: 50
End: 2020-11-03

## 2020-11-03 DIAGNOSIS — Z82.49 FAMILY HISTORY OF ABDOMINAL AORTIC ANEURYSM (AAA): Primary | ICD-10-CM

## 2020-11-04 NOTE — TELEPHONE ENCOUNTER
I have signed for the following orders AND/OR meds.  Please call the patient and ask the patient to schedule the testing AND/OR inform about any medications that were sent. Medications have been sent to pharmacy listed below      Orders Placed This Encounter   Procedures    US Abdominal Aorta     Standing Status:   Future     Standing Expiration Date:   11/4/2021     Order Specific Question:   May the Radiologist modify the order per protocol to meet the clinical needs of the patient?     Answer:   Yes              [id number]  [place] [address] [place] [address]  Phone: [phone] Fax: [phone]    [id number] - [place] - [address]  [address]  [place] [address]  Phone: [phone] Fax: [phone]

## 2020-11-11 ENCOUNTER — PATIENT MESSAGE (OUTPATIENT)
Dept: FAMILY MEDICINE | Facility: CLINIC | Age: 50
End: 2020-11-11

## 2020-11-30 ENCOUNTER — HOSPITAL ENCOUNTER (OUTPATIENT)
Dept: RADIOLOGY | Facility: HOSPITAL | Age: 50
Discharge: HOME | End: 2020-11-30
Attending: INTERNAL MEDICINE
Payer: COMMERCIAL

## 2020-11-30 DIAGNOSIS — Z82.49 FAMILY HISTORY OF ABDOMINAL AORTIC ANEURYSM (AAA): ICD-10-CM

## 2020-11-30 PROCEDURE — 76775 US EXAM ABDO BACK WALL LIM: CPT | Mod: TC,PO

## 2020-11-30 PROCEDURE — 76775 US ABDOMINAL AORTA: ICD-10-PCS | Mod: 26,,, | Performed by: RADIOLOGY

## 2020-11-30 PROCEDURE — 76775 US EXAM ABDO BACK WALL LIM: CPT | Mod: 26,,, | Performed by: RADIOLOGY

## 2020-11-30 NOTE — PROGRESS NOTES
The following results have been released to patient's "[employer]. Please verify that they have reviewed results. If not, please call patient with below:    I have reviewed your recent abdominal aortic ultrasound.    Ultrasound showed no evidence of an an aneurysm.     Please do not hesitate to call or message with any additional questions or concerns.    Terrie Hartmann MD

## 2021-02-02 DIAGNOSIS — G89.29 CHRONIC LEFT SHOULDER PAIN: Primary | ICD-10-CM

## 2021-02-02 DIAGNOSIS — M25.512 CHRONIC LEFT SHOULDER PAIN: Primary | ICD-10-CM

## 2021-02-04 ENCOUNTER — OFFICE VISIT (OUTPATIENT)
Dept: ORTHOPEDICS | Facility: CLINIC | Age: 51
End: 2021-02-04
Payer: COMMERCIAL

## 2021-02-04 ENCOUNTER — HOSPITAL ENCOUNTER (OUTPATIENT)
Dept: RADIOLOGY | Facility: HOSPITAL | Age: 51
Discharge: HOME | End: 2021-02-04
Attending: ORTHOPAEDIC SURGERY
Payer: COMMERCIAL

## 2021-02-04 VITALS — HEIGHT: 62 IN | WEIGHT: 279 LBS | BODY MASS INDEX: 51.34 KG/M2

## 2021-02-04 DIAGNOSIS — M25.519 SHOULDER PAIN, UNSPECIFIED CHRONICITY, UNSPECIFIED LATERALITY: ICD-10-CM

## 2021-02-04 DIAGNOSIS — M25.512 CHRONIC LEFT SHOULDER PAIN: ICD-10-CM

## 2021-02-04 DIAGNOSIS — G89.29 CHRONIC LEFT SHOULDER PAIN: ICD-10-CM

## 2021-02-04 DIAGNOSIS — M25.512 CHRONIC LEFT SHOULDER PAIN: Primary | ICD-10-CM

## 2021-02-04 DIAGNOSIS — G89.29 CHRONIC LEFT SHOULDER PAIN: Primary | ICD-10-CM

## 2021-02-04 PROCEDURE — 99999 PR PBB SHADOW E&M-EST. PATIENT-LVL III: ICD-10-PCS | Mod: PBBFAC,,, | Performed by: ORTHOPAEDIC SURGERY

## 2021-02-04 PROCEDURE — 99999 PR PBB SHADOW E&M-EST. PATIENT-LVL III: CPT | Mod: PBBFAC,,, | Performed by: ORTHOPAEDIC SURGERY

## 2021-02-04 PROCEDURE — 99213 PR OFFICE/OUTPT VISIT, EST, LEVL III, 20-29 MIN: ICD-10-PCS | Mod: S$GLB,,, | Performed by: ORTHOPAEDIC SURGERY

## 2021-02-04 PROCEDURE — 73030 X-RAY EXAM OF SHOULDER: CPT | Mod: TC,PO,LT

## 2021-02-04 PROCEDURE — 73030 XR SHOULDER TRAUMA 3 VIEW LEFT: ICD-10-PCS | Mod: 26,LT,, | Performed by: RADIOLOGY

## 2021-02-04 PROCEDURE — 99213 OFFICE O/P EST LOW 20 MIN: CPT | Mod: S$GLB,,, | Performed by: ORTHOPAEDIC SURGERY

## 2021-02-04 PROCEDURE — 73030 X-RAY EXAM OF SHOULDER: CPT | Mod: 26,LT,, | Performed by: RADIOLOGY

## 2021-02-12 ENCOUNTER — TELEPHONE (OUTPATIENT)
Dept: BARIATRICS | Facility: CLINIC | Age: 51
End: 2021-02-12

## 2021-02-17 ENCOUNTER — HOSPITAL ENCOUNTER (OUTPATIENT)
Dept: RADIOLOGY | Facility: HOSPITAL | Age: 51
Discharge: HOME | End: 2021-02-17
Attending: ORTHOPAEDIC SURGERY
Payer: COMMERCIAL

## 2021-02-17 DIAGNOSIS — G89.29 CHRONIC LEFT SHOULDER PAIN: ICD-10-CM

## 2021-02-17 DIAGNOSIS — M25.512 CHRONIC LEFT SHOULDER PAIN: ICD-10-CM

## 2021-02-17 DIAGNOSIS — M25.519 SHOULDER PAIN, UNSPECIFIED CHRONICITY, UNSPECIFIED LATERALITY: ICD-10-CM

## 2021-02-17 PROCEDURE — 73221 MRI JOINT UPR EXTREM W/O DYE: CPT | Mod: TC,PO,LT

## 2021-02-17 PROCEDURE — 73221 MRI JOINT UPR EXTREM W/O DYE: CPT | Mod: 26,LT,, | Performed by: RADIOLOGY

## 2021-02-17 PROCEDURE — 73221 MRI SHOULDER WITHOUT CONTRAST LEFT: ICD-10-PCS | Mod: 26,LT,, | Performed by: RADIOLOGY

## 2021-02-22 ENCOUNTER — OFFICE VISIT (OUTPATIENT)
Dept: ORTHOPEDICS | Facility: CLINIC | Age: 51
End: 2021-02-22
Payer: COMMERCIAL

## 2021-02-22 VITALS — WEIGHT: 279.13 LBS | HEIGHT: 62 IN | BODY MASS INDEX: 51.37 KG/M2

## 2021-02-22 DIAGNOSIS — M25.512 CHRONIC LEFT SHOULDER PAIN: Primary | ICD-10-CM

## 2021-02-22 DIAGNOSIS — G89.29 CHRONIC LEFT SHOULDER PAIN: Primary | ICD-10-CM

## 2021-02-22 PROCEDURE — 99999 PR PBB SHADOW E&M-EST. PATIENT-LVL III: CPT | Mod: PBBFAC,,, | Performed by: ORTHOPAEDIC SURGERY

## 2021-02-22 PROCEDURE — 99214 OFFICE O/P EST MOD 30 MIN: CPT | Mod: 25,S$GLB,, | Performed by: ORTHOPAEDIC SURGERY

## 2021-02-22 PROCEDURE — 99999 PR PBB SHADOW E&M-EST. PATIENT-LVL III: ICD-10-PCS | Mod: PBBFAC,,, | Performed by: ORTHOPAEDIC SURGERY

## 2021-02-22 PROCEDURE — 20610 LARGE JOINT ASPIRATION/INJECTION: L SUBACROMIAL BURSA: ICD-10-PCS | Mod: LT,S$GLB,, | Performed by: ORTHOPAEDIC SURGERY

## 2021-02-22 PROCEDURE — 99214 PR OFFICE/OUTPT VISIT, EST, LEVL IV, 30-39 MIN: ICD-10-PCS | Mod: 25,S$GLB,, | Performed by: ORTHOPAEDIC SURGERY

## 2021-02-22 PROCEDURE — 20610 DRAIN/INJ JOINT/BURSA W/O US: CPT | Mod: LT,S$GLB,, | Performed by: ORTHOPAEDIC SURGERY

## 2021-02-22 RX ORDER — TRIAMCINOLONE ACETONIDE 40 MG/ML
80 INJECTION, SUSPENSION INTRA-ARTICULAR; INTRAMUSCULAR
Status: DISCONTINUED | OUTPATIENT
Start: 2021-02-22 | End: 2021-02-22 | Stop reason: HOSPADM

## 2021-02-22 RX ADMIN — TRIAMCINOLONE ACETONIDE 80 MG: 40 INJECTION, SUSPENSION INTRA-ARTICULAR; INTRAMUSCULAR at 08:02

## 2021-02-24 ENCOUNTER — OFFICE VISIT (OUTPATIENT)
Dept: BARIATRICS | Facility: CLINIC | Age: 51
End: 2021-02-24
Payer: COMMERCIAL

## 2021-02-24 VITALS
HEART RATE: 60 BPM | HEIGHT: 62 IN | DIASTOLIC BLOOD PRESSURE: 78 MMHG | WEIGHT: 277.13 LBS | BODY MASS INDEX: 51 KG/M2 | SYSTOLIC BLOOD PRESSURE: 116 MMHG | OXYGEN SATURATION: 97 %

## 2021-02-24 DIAGNOSIS — K21.9 GASTROESOPHAGEAL REFLUX DISEASE WITHOUT ESOPHAGITIS: ICD-10-CM

## 2021-02-24 DIAGNOSIS — K91.0 VOMITING (BILIOUS) FOLLOWING GASTROINTESTINAL SURGERY: ICD-10-CM

## 2021-02-24 DIAGNOSIS — R21 RASH: ICD-10-CM

## 2021-02-24 DIAGNOSIS — R63.5 WEIGHT GAIN: Primary | ICD-10-CM

## 2021-02-24 DIAGNOSIS — Z98.84 HISTORY OF ROUX-EN-Y GASTRIC BYPASS: ICD-10-CM

## 2021-02-24 PROCEDURE — 99999 PR PBB SHADOW E&M-EST. PATIENT-LVL III: CPT | Mod: PBBFAC,,, | Performed by: PHYSICIAN ASSISTANT

## 2021-02-24 PROCEDURE — 99999 PR PBB SHADOW E&M-EST. PATIENT-LVL III: ICD-10-PCS | Mod: PBBFAC,,, | Performed by: PHYSICIAN ASSISTANT

## 2021-02-24 PROCEDURE — 99214 PR OFFICE/OUTPT VISIT, EST, LEVL IV, 30-39 MIN: ICD-10-PCS | Mod: S$GLB,,, | Performed by: PHYSICIAN ASSISTANT

## 2021-02-24 PROCEDURE — 99214 OFFICE O/P EST MOD 30 MIN: CPT | Mod: S$GLB,,, | Performed by: PHYSICIAN ASSISTANT

## 2021-02-24 RX ORDER — OMEPRAZOLE 40 MG/1
40 CAPSULE, DELAYED RELEASE ORAL EVERY MORNING
Qty: 180 CAPSULE | Refills: 0 | Status: SHIPPED | OUTPATIENT
Start: 2021-02-24

## 2021-02-24 RX ORDER — NYSTATIN 100000 [USP'U]/G
POWDER TOPICAL 3 TIMES DAILY
Qty: 1 BOTTLE | Refills: 3 | Status: SHIPPED | OUTPATIENT
Start: 2021-02-24 | End: 2024-02-08

## 2021-02-26 ENCOUNTER — CLINICAL SUPPORT (OUTPATIENT)
Dept: REHABILITATION | Facility: HOSPITAL | Age: 51
End: 2021-02-26
Payer: COMMERCIAL

## 2021-02-26 DIAGNOSIS — R29.3 POSTURE ABNORMALITY: ICD-10-CM

## 2021-02-26 DIAGNOSIS — R29.898 WEAKNESS OF LEFT UPPER EXTREMITY: ICD-10-CM

## 2021-02-26 DIAGNOSIS — G89.29 CHRONIC LEFT SHOULDER PAIN: ICD-10-CM

## 2021-02-26 DIAGNOSIS — M25.512 CHRONIC LEFT SHOULDER PAIN: ICD-10-CM

## 2021-02-26 DIAGNOSIS — M25.612 DECREASED ROM OF LEFT SHOULDER: ICD-10-CM

## 2021-02-26 PROCEDURE — 97161 PT EVAL LOW COMPLEX 20 MIN: CPT | Mod: PN

## 2021-02-26 PROCEDURE — 97110 THERAPEUTIC EXERCISES: CPT | Mod: PN

## 2021-03-01 ENCOUNTER — LAB VISIT (OUTPATIENT)
Dept: LAB | Facility: HOSPITAL | Age: 51
End: 2021-03-01
Attending: INTERNAL MEDICINE
Payer: COMMERCIAL

## 2021-03-01 DIAGNOSIS — Z98.84 HISTORY OF ROUX-EN-Y GASTRIC BYPASS: ICD-10-CM

## 2021-03-01 DIAGNOSIS — R63.5 WEIGHT GAIN: ICD-10-CM

## 2021-03-01 LAB
25(OH)D3+25(OH)D2 SERPL-MCNC: 11 NG/ML (ref 30–96)
ALBUMIN SERPL BCP-MCNC: 3.6 G/DL (ref 3.5–5.2)
ALP SERPL-CCNC: 87 U/L (ref 55–135)
ALT SERPL W/O P-5'-P-CCNC: 11 U/L (ref 10–44)
ANION GAP SERPL CALC-SCNC: 8 MMOL/L (ref 8–16)
AST SERPL-CCNC: 11 U/L (ref 10–40)
BASOPHILS # BLD AUTO: 0.03 K/UL (ref 0–0.2)
BASOPHILS NFR BLD: 0.5 % (ref 0–1.9)
BILIRUB SERPL-MCNC: 0.6 MG/DL (ref 0.1–1)
BUN SERPL-MCNC: 15 MG/DL (ref 6–20)
CALCIUM SERPL-MCNC: 8.9 MG/DL (ref 8.7–10.5)
CHLORIDE SERPL-SCNC: 104 MMOL/L (ref 95–110)
CHOLEST SERPL-MCNC: 179 MG/DL (ref 120–199)
CHOLEST/HDLC SERPL: 2.6 {RATIO} (ref 2–5)
CO2 SERPL-SCNC: 28 MMOL/L (ref 23–29)
CREAT SERPL-MCNC: 0.7 MG/DL (ref 0.5–1.4)
DIFFERENTIAL METHOD: ABNORMAL
EOSINOPHIL # BLD AUTO: 0 K/UL (ref 0–0.5)
EOSINOPHIL NFR BLD: 0.2 % (ref 0–8)
ERYTHROCYTE [DISTWIDTH] IN BLOOD BY AUTOMATED COUNT: 13.2 % (ref 11.5–14.5)
EST. GFR  (AFRICAN AMERICAN): >60 ML/MIN/1.73 M^2
EST. GFR  (NON AFRICAN AMERICAN): >60 ML/MIN/1.73 M^2
GLUCOSE SERPL-MCNC: 83 MG/DL (ref 70–110)
HCT VFR BLD AUTO: 41.3 % (ref 37–48.5)
HDLC SERPL-MCNC: 68 MG/DL (ref 40–75)
HDLC SERPL: 38 % (ref 20–50)
HGB BLD-MCNC: 13.3 G/DL (ref 12–16)
IMM GRANULOCYTES # BLD AUTO: 0.02 K/UL (ref 0–0.04)
IMM GRANULOCYTES NFR BLD AUTO: 0.3 % (ref 0–0.5)
IRON SERPL-MCNC: 91 UG/DL (ref 30–160)
LDLC SERPL CALC-MCNC: 100.4 MG/DL (ref 63–159)
LYMPHOCYTES # BLD AUTO: 0.8 K/UL (ref 1–4.8)
LYMPHOCYTES NFR BLD: 12.5 % (ref 18–48)
MCH RBC QN AUTO: 28.2 PG (ref 27–31)
MCHC RBC AUTO-ENTMCNC: 32.2 G/DL (ref 32–36)
MCV RBC AUTO: 88 FL (ref 82–98)
MONOCYTES # BLD AUTO: 0.5 K/UL (ref 0.3–1)
MONOCYTES NFR BLD: 7.2 % (ref 4–15)
NEUTROPHILS # BLD AUTO: 5.1 K/UL (ref 1.8–7.7)
NEUTROPHILS NFR BLD: 79.3 % (ref 38–73)
NONHDLC SERPL-MCNC: 111 MG/DL
NRBC BLD-RTO: 0 /100 WBC
PLATELET # BLD AUTO: 265 K/UL (ref 150–350)
PMV BLD AUTO: 12 FL (ref 9.2–12.9)
POTASSIUM SERPL-SCNC: 3.8 MMOL/L (ref 3.5–5.1)
PROT SERPL-MCNC: 6.7 G/DL (ref 6–8.4)
RBC # BLD AUTO: 4.71 M/UL (ref 4–5.4)
SATURATED IRON: 19 % (ref 20–50)
SODIUM SERPL-SCNC: 140 MMOL/L (ref 136–145)
TOTAL IRON BINDING CAPACITY: 471 UG/DL (ref 250–450)
TRANSFERRIN SERPL-MCNC: 318 MG/DL (ref 200–375)
TRIGL SERPL-MCNC: 53 MG/DL (ref 30–150)
VIT B12 SERPL-MCNC: 272 PG/ML (ref 210–950)
WBC # BLD AUTO: 6.41 K/UL (ref 3.9–12.7)

## 2021-03-01 PROCEDURE — 82607 VITAMIN B-12: CPT

## 2021-03-01 PROCEDURE — 84425 ASSAY OF VITAMIN B-1: CPT

## 2021-03-01 PROCEDURE — 80053 COMPREHEN METABOLIC PANEL: CPT

## 2021-03-01 PROCEDURE — 82306 VITAMIN D 25 HYDROXY: CPT

## 2021-03-01 PROCEDURE — 83540 ASSAY OF IRON: CPT

## 2021-03-01 PROCEDURE — 80061 LIPID PANEL: CPT

## 2021-03-01 PROCEDURE — 85025 COMPLETE CBC W/AUTO DIFF WBC: CPT

## 2021-03-01 PROCEDURE — 36415 COLL VENOUS BLD VENIPUNCTURE: CPT | Mod: PO

## 2021-03-02 DIAGNOSIS — R79.89 LOW VITAMIN D LEVEL: Primary | ICD-10-CM

## 2021-03-02 RX ORDER — ERGOCALCIFEROL 1.25 MG/1
50000 CAPSULE ORAL
Qty: 24 CAPSULE | Refills: 0 | Status: SHIPPED | OUTPATIENT
Start: 2021-03-04 | End: 2023-05-22

## 2021-03-05 LAB — VIT B1 BLD-MCNC: 40 UG/L (ref 38–122)

## 2021-03-08 ENCOUNTER — CLINICAL SUPPORT (OUTPATIENT)
Dept: REHABILITATION | Facility: HOSPITAL | Age: 51
End: 2021-03-08
Payer: COMMERCIAL

## 2021-03-08 DIAGNOSIS — R29.898 WEAKNESS OF LEFT UPPER EXTREMITY: ICD-10-CM

## 2021-03-08 DIAGNOSIS — M25.612 DECREASED ROM OF LEFT SHOULDER: ICD-10-CM

## 2021-03-08 DIAGNOSIS — R29.3 POSTURE ABNORMALITY: ICD-10-CM

## 2021-03-08 PROCEDURE — 97140 MANUAL THERAPY 1/> REGIONS: CPT | Mod: PN

## 2021-03-08 PROCEDURE — 97110 THERAPEUTIC EXERCISES: CPT | Mod: PN

## 2021-03-10 ENCOUNTER — CLINICAL SUPPORT (OUTPATIENT)
Dept: REHABILITATION | Facility: HOSPITAL | Age: 51
End: 2021-03-10
Payer: COMMERCIAL

## 2021-03-10 DIAGNOSIS — R29.898 WEAKNESS OF LEFT UPPER EXTREMITY: ICD-10-CM

## 2021-03-10 DIAGNOSIS — M25.612 DECREASED ROM OF LEFT SHOULDER: ICD-10-CM

## 2021-03-10 DIAGNOSIS — R29.3 POSTURE ABNORMALITY: ICD-10-CM

## 2021-03-10 PROCEDURE — 97110 THERAPEUTIC EXERCISES: CPT | Mod: PN

## 2021-03-10 PROCEDURE — 97140 MANUAL THERAPY 1/> REGIONS: CPT | Mod: PN

## 2021-03-15 ENCOUNTER — CLINICAL SUPPORT (OUTPATIENT)
Dept: REHABILITATION | Facility: HOSPITAL | Age: 51
End: 2021-03-15
Payer: COMMERCIAL

## 2021-03-15 DIAGNOSIS — R29.3 POSTURE ABNORMALITY: ICD-10-CM

## 2021-03-15 DIAGNOSIS — M25.612 DECREASED ROM OF LEFT SHOULDER: ICD-10-CM

## 2021-03-15 DIAGNOSIS — R29.898 WEAKNESS OF LEFT UPPER EXTREMITY: ICD-10-CM

## 2021-03-15 PROCEDURE — 97110 THERAPEUTIC EXERCISES: CPT | Mod: PN

## 2021-03-15 PROCEDURE — 97140 MANUAL THERAPY 1/> REGIONS: CPT | Mod: PN

## 2021-03-17 ENCOUNTER — PATIENT MESSAGE (OUTPATIENT)
Dept: BARIATRICS | Facility: CLINIC | Age: 51
End: 2021-03-17

## 2021-03-17 ENCOUNTER — OFFICE VISIT (OUTPATIENT)
Dept: BARIATRICS | Facility: CLINIC | Age: 51
End: 2021-03-17
Payer: COMMERCIAL

## 2021-03-17 VITALS — HEIGHT: 62 IN | WEIGHT: 283.38 LBS | BODY MASS INDEX: 52.15 KG/M2

## 2021-03-17 DIAGNOSIS — E66.01 CLASS 3 SEVERE OBESITY DUE TO EXCESS CALORIES WITH SERIOUS COMORBIDITY AND BODY MASS INDEX (BMI) OF 50.0 TO 59.9 IN ADULT: ICD-10-CM

## 2021-03-17 DIAGNOSIS — R63.5 WEIGHT GAIN: ICD-10-CM

## 2021-03-17 DIAGNOSIS — Z98.84 HISTORY OF ROUX-EN-Y GASTRIC BYPASS: ICD-10-CM

## 2021-03-17 DIAGNOSIS — Z98.84 HISTORY OF GASTRIC BYPASS: Primary | ICD-10-CM

## 2021-03-17 PROCEDURE — 99999 PR PBB SHADOW E&M-EST. PATIENT-LVL III: ICD-10-PCS | Mod: PBBFAC,,, | Performed by: STUDENT IN AN ORGANIZED HEALTH CARE EDUCATION/TRAINING PROGRAM

## 2021-03-17 PROCEDURE — 99999 PR PBB SHADOW E&M-EST. PATIENT-LVL III: CPT | Mod: PBBFAC,,, | Performed by: STUDENT IN AN ORGANIZED HEALTH CARE EDUCATION/TRAINING PROGRAM

## 2021-03-17 PROCEDURE — 99214 PR OFFICE/OUTPT VISIT, EST, LEVL IV, 30-39 MIN: ICD-10-PCS | Mod: S$GLB,,, | Performed by: STUDENT IN AN ORGANIZED HEALTH CARE EDUCATION/TRAINING PROGRAM

## 2021-03-17 PROCEDURE — 99214 OFFICE O/P EST MOD 30 MIN: CPT | Mod: S$GLB,,, | Performed by: STUDENT IN AN ORGANIZED HEALTH CARE EDUCATION/TRAINING PROGRAM

## 2021-03-17 RX ORDER — SEMAGLUTIDE 1.34 MG/ML
INJECTION, SOLUTION SUBCUTANEOUS
Qty: 1.5 ML | Refills: 0 | Status: SHIPPED | OUTPATIENT
Start: 2021-03-17 | End: 2021-04-28

## 2021-03-25 ENCOUNTER — CLINICAL SUPPORT (OUTPATIENT)
Dept: REHABILITATION | Facility: HOSPITAL | Age: 51
End: 2021-03-25
Payer: COMMERCIAL

## 2021-03-25 ENCOUNTER — PATIENT MESSAGE (OUTPATIENT)
Dept: BARIATRICS | Facility: CLINIC | Age: 51
End: 2021-03-25

## 2021-03-25 ENCOUNTER — PATIENT MESSAGE (OUTPATIENT)
Dept: ADMINISTRATIVE | Facility: HOSPITAL | Age: 51
End: 2021-03-25

## 2021-03-25 DIAGNOSIS — R29.898 WEAKNESS OF LEFT UPPER EXTREMITY: ICD-10-CM

## 2021-03-25 DIAGNOSIS — M25.612 DECREASED ROM OF LEFT SHOULDER: ICD-10-CM

## 2021-03-25 DIAGNOSIS — R29.3 POSTURE ABNORMALITY: ICD-10-CM

## 2021-03-25 PROCEDURE — 97110 THERAPEUTIC EXERCISES: CPT | Mod: PN

## 2021-03-26 ENCOUNTER — PATIENT MESSAGE (OUTPATIENT)
Dept: BARIATRICS | Facility: CLINIC | Age: 51
End: 2021-03-26

## 2021-03-29 RX ORDER — TOPIRAMATE 25 MG/1
25 TABLET ORAL 2 TIMES DAILY
Qty: 60 TABLET | Refills: 11 | Status: SHIPPED | OUTPATIENT
Start: 2021-03-29 | End: 2021-05-10

## 2021-03-31 ENCOUNTER — CLINICAL SUPPORT (OUTPATIENT)
Dept: REHABILITATION | Facility: HOSPITAL | Age: 51
End: 2021-03-31
Payer: COMMERCIAL

## 2021-03-31 DIAGNOSIS — M25.612 DECREASED ROM OF LEFT SHOULDER: ICD-10-CM

## 2021-03-31 DIAGNOSIS — R29.3 POSTURE ABNORMALITY: ICD-10-CM

## 2021-03-31 DIAGNOSIS — R29.898 WEAKNESS OF LEFT UPPER EXTREMITY: ICD-10-CM

## 2021-03-31 PROCEDURE — 97140 MANUAL THERAPY 1/> REGIONS: CPT | Mod: PN,CQ

## 2021-03-31 PROCEDURE — 97110 THERAPEUTIC EXERCISES: CPT | Mod: PN,CQ

## 2021-04-05 ENCOUNTER — CLINICAL SUPPORT (OUTPATIENT)
Dept: REHABILITATION | Facility: HOSPITAL | Age: 51
End: 2021-04-05
Payer: COMMERCIAL

## 2021-04-05 DIAGNOSIS — R29.898 WEAKNESS OF LEFT UPPER EXTREMITY: ICD-10-CM

## 2021-04-05 DIAGNOSIS — M25.612 DECREASED ROM OF LEFT SHOULDER: ICD-10-CM

## 2021-04-05 DIAGNOSIS — R29.3 POSTURE ABNORMALITY: ICD-10-CM

## 2021-04-05 PROCEDURE — 97110 THERAPEUTIC EXERCISES: CPT | Mod: PN

## 2021-04-05 PROCEDURE — 97140 MANUAL THERAPY 1/> REGIONS: CPT | Mod: PN

## 2021-04-19 ENCOUNTER — CLINICAL SUPPORT (OUTPATIENT)
Dept: REHABILITATION | Facility: HOSPITAL | Age: 51
End: 2021-04-19
Payer: COMMERCIAL

## 2021-04-19 DIAGNOSIS — M25.612 DECREASED ROM OF LEFT SHOULDER: ICD-10-CM

## 2021-04-19 DIAGNOSIS — R29.3 POSTURE ABNORMALITY: ICD-10-CM

## 2021-04-19 DIAGNOSIS — R29.898 WEAKNESS OF LEFT UPPER EXTREMITY: ICD-10-CM

## 2021-04-19 PROCEDURE — 97140 MANUAL THERAPY 1/> REGIONS: CPT | Mod: PN

## 2021-04-19 PROCEDURE — 97110 THERAPEUTIC EXERCISES: CPT | Mod: PN

## 2021-04-26 ENCOUNTER — CLINICAL SUPPORT (OUTPATIENT)
Dept: REHABILITATION | Facility: HOSPITAL | Age: 51
End: 2021-04-26
Payer: COMMERCIAL

## 2021-04-26 DIAGNOSIS — R29.3 POSTURE ABNORMALITY: ICD-10-CM

## 2021-04-26 DIAGNOSIS — R29.898 WEAKNESS OF LEFT UPPER EXTREMITY: ICD-10-CM

## 2021-04-26 DIAGNOSIS — M25.612 DECREASED ROM OF LEFT SHOULDER: ICD-10-CM

## 2021-04-26 PROCEDURE — 97110 THERAPEUTIC EXERCISES: CPT | Mod: PN

## 2021-04-26 PROCEDURE — 97140 MANUAL THERAPY 1/> REGIONS: CPT | Mod: PN

## 2021-05-10 ENCOUNTER — OFFICE VISIT (OUTPATIENT)
Dept: BARIATRICS | Facility: CLINIC | Age: 51
End: 2021-05-10
Payer: COMMERCIAL

## 2021-05-10 VITALS
HEART RATE: 67 BPM | SYSTOLIC BLOOD PRESSURE: 139 MMHG | BODY MASS INDEX: 51.41 KG/M2 | DIASTOLIC BLOOD PRESSURE: 80 MMHG | OXYGEN SATURATION: 98 % | WEIGHT: 279.38 LBS | HEIGHT: 62 IN

## 2021-05-10 DIAGNOSIS — E66.01 CLASS 3 SEVERE OBESITY WITHOUT SERIOUS COMORBIDITY WITH BODY MASS INDEX (BMI) OF 50.0 TO 59.9 IN ADULT, UNSPECIFIED OBESITY TYPE: Primary | ICD-10-CM

## 2021-05-10 DIAGNOSIS — Z98.84 S/P BARIATRIC SURGERY: ICD-10-CM

## 2021-05-10 PROCEDURE — 99213 PR OFFICE/OUTPT VISIT, EST, LEVL III, 20-29 MIN: ICD-10-PCS | Mod: S$GLB,,, | Performed by: STUDENT IN AN ORGANIZED HEALTH CARE EDUCATION/TRAINING PROGRAM

## 2021-05-10 PROCEDURE — 99213 OFFICE O/P EST LOW 20 MIN: CPT | Mod: S$GLB,,, | Performed by: STUDENT IN AN ORGANIZED HEALTH CARE EDUCATION/TRAINING PROGRAM

## 2021-05-10 PROCEDURE — 99999 PR PBB SHADOW E&M-EST. PATIENT-LVL III: ICD-10-PCS | Mod: PBBFAC,,, | Performed by: STUDENT IN AN ORGANIZED HEALTH CARE EDUCATION/TRAINING PROGRAM

## 2021-05-10 PROCEDURE — 99999 PR PBB SHADOW E&M-EST. PATIENT-LVL III: CPT | Mod: PBBFAC,,, | Performed by: STUDENT IN AN ORGANIZED HEALTH CARE EDUCATION/TRAINING PROGRAM

## 2021-05-10 RX ORDER — TOPIRAMATE 50 MG/1
50 TABLET, FILM COATED ORAL 2 TIMES DAILY
Qty: 180 TABLET | Refills: 0 | Status: SHIPPED | OUTPATIENT
Start: 2021-05-10 | End: 2021-11-29

## 2021-06-15 ENCOUNTER — OFFICE VISIT (OUTPATIENT)
Dept: BARIATRICS | Facility: CLINIC | Age: 51
End: 2021-06-15
Payer: COMMERCIAL

## 2021-06-15 VITALS
HEART RATE: 80 BPM | WEIGHT: 277.69 LBS | OXYGEN SATURATION: 98 % | BODY MASS INDEX: 50.79 KG/M2 | SYSTOLIC BLOOD PRESSURE: 124 MMHG | DIASTOLIC BLOOD PRESSURE: 76 MMHG

## 2021-06-15 DIAGNOSIS — E66.01 CLASS 3 SEVERE OBESITY WITH SERIOUS COMORBIDITY AND BODY MASS INDEX (BMI) OF 50.0 TO 59.9 IN ADULT, UNSPECIFIED OBESITY TYPE: Primary | ICD-10-CM

## 2021-06-15 DIAGNOSIS — Z98.84 S/P BARIATRIC SURGERY: ICD-10-CM

## 2021-06-15 PROCEDURE — 99999 PR PBB SHADOW E&M-EST. PATIENT-LVL III: ICD-10-PCS | Mod: PBBFAC,,, | Performed by: STUDENT IN AN ORGANIZED HEALTH CARE EDUCATION/TRAINING PROGRAM

## 2021-06-15 PROCEDURE — 99213 OFFICE O/P EST LOW 20 MIN: CPT | Mod: S$GLB,,, | Performed by: STUDENT IN AN ORGANIZED HEALTH CARE EDUCATION/TRAINING PROGRAM

## 2021-06-15 PROCEDURE — 99213 PR OFFICE/OUTPT VISIT, EST, LEVL III, 20-29 MIN: ICD-10-PCS | Mod: S$GLB,,, | Performed by: STUDENT IN AN ORGANIZED HEALTH CARE EDUCATION/TRAINING PROGRAM

## 2021-06-15 PROCEDURE — 99999 PR PBB SHADOW E&M-EST. PATIENT-LVL III: CPT | Mod: PBBFAC,,, | Performed by: STUDENT IN AN ORGANIZED HEALTH CARE EDUCATION/TRAINING PROGRAM

## 2021-06-15 RX ORDER — PHENTERMINE HYDROCHLORIDE 37.5 MG/1
37.5 TABLET ORAL
Qty: 30 TABLET | Refills: 0 | Status: SHIPPED | OUTPATIENT
Start: 2021-06-15 | End: 2021-07-15

## 2021-07-09 DIAGNOSIS — M25.562 LEFT KNEE PAIN, UNSPECIFIED CHRONICITY: Primary | ICD-10-CM

## 2021-07-12 ENCOUNTER — HOSPITAL ENCOUNTER (OUTPATIENT)
Dept: RADIOLOGY | Facility: HOSPITAL | Age: 51
Discharge: HOME | End: 2021-07-12
Attending: ORTHOPAEDIC SURGERY
Payer: COMMERCIAL

## 2021-07-12 ENCOUNTER — OFFICE VISIT (OUTPATIENT)
Dept: ORTHOPEDICS | Facility: CLINIC | Age: 51
End: 2021-07-12
Payer: COMMERCIAL

## 2021-07-12 VITALS — HEIGHT: 62 IN | WEIGHT: 277.75 LBS | BODY MASS INDEX: 51.11 KG/M2

## 2021-07-12 DIAGNOSIS — M17.12 OSTEOARTHRITIS OF LEFT KNEE, UNSPECIFIED OSTEOARTHRITIS TYPE: Primary | ICD-10-CM

## 2021-07-12 DIAGNOSIS — M25.562 LEFT KNEE PAIN, UNSPECIFIED CHRONICITY: ICD-10-CM

## 2021-07-12 DIAGNOSIS — Z86.718 HISTORY OF DVT (DEEP VEIN THROMBOSIS): ICD-10-CM

## 2021-07-12 PROCEDURE — 99214 OFFICE O/P EST MOD 30 MIN: CPT | Mod: 25,S$GLB,, | Performed by: ORTHOPAEDIC SURGERY

## 2021-07-12 PROCEDURE — 20610 LARGE JOINT ASPIRATION/INJECTION: L KNEE: ICD-10-PCS | Mod: LT,S$GLB,, | Performed by: ORTHOPAEDIC SURGERY

## 2021-07-12 PROCEDURE — 20610 DRAIN/INJ JOINT/BURSA W/O US: CPT | Mod: LT,S$GLB,, | Performed by: ORTHOPAEDIC SURGERY

## 2021-07-12 PROCEDURE — 99999 PR PBB SHADOW E&M-EST. PATIENT-LVL III: ICD-10-PCS | Mod: PBBFAC,,, | Performed by: ORTHOPAEDIC SURGERY

## 2021-07-12 PROCEDURE — 73560 X-RAY EXAM OF KNEE 1 OR 2: CPT | Mod: 26,RT,, | Performed by: RADIOLOGY

## 2021-07-12 PROCEDURE — 99214 PR OFFICE/OUTPT VISIT, EST, LEVL IV, 30-39 MIN: ICD-10-PCS | Mod: 25,S$GLB,, | Performed by: ORTHOPAEDIC SURGERY

## 2021-07-12 PROCEDURE — 73562 X-RAY EXAM OF KNEE 3: CPT | Mod: 26,LT,, | Performed by: RADIOLOGY

## 2021-07-12 PROCEDURE — 73562 XR KNEE ORTHO LEFT: ICD-10-PCS | Mod: 26,LT,, | Performed by: RADIOLOGY

## 2021-07-12 PROCEDURE — 73560 XR KNEE ORTHO LEFT: ICD-10-PCS | Mod: 26,RT,, | Performed by: RADIOLOGY

## 2021-07-12 PROCEDURE — 73560 X-RAY EXAM OF KNEE 1 OR 2: CPT | Mod: TC,PO,RT

## 2021-07-12 PROCEDURE — 99999 PR PBB SHADOW E&M-EST. PATIENT-LVL III: CPT | Mod: PBBFAC,,, | Performed by: ORTHOPAEDIC SURGERY

## 2021-07-12 RX ORDER — TRIAMCINOLONE ACETONIDE 40 MG/ML
40 INJECTION, SUSPENSION INTRA-ARTICULAR; INTRAMUSCULAR
Status: DISCONTINUED | OUTPATIENT
Start: 2021-07-12 | End: 2021-07-12 | Stop reason: HOSPADM

## 2021-07-12 RX ADMIN — TRIAMCINOLONE ACETONIDE 40 MG: 40 INJECTION, SUSPENSION INTRA-ARTICULAR; INTRAMUSCULAR at 08:07

## 2021-07-26 ENCOUNTER — DOCUMENTATION ONLY (OUTPATIENT)
Dept: REHABILITATION | Facility: HOSPITAL | Age: 51
End: 2021-07-26

## 2021-07-26 PROBLEM — M25.612 DECREASED ROM OF LEFT SHOULDER: Status: RESOLVED | Noted: 2021-02-26 | Resolved: 2021-07-26

## 2021-07-26 PROBLEM — R29.3 POSTURE ABNORMALITY: Status: RESOLVED | Noted: 2021-02-26 | Resolved: 2021-07-26

## 2021-07-26 PROBLEM — R29.898 WEAKNESS OF LEFT UPPER EXTREMITY: Status: RESOLVED | Noted: 2021-02-26 | Resolved: 2021-07-26

## 2021-07-28 ENCOUNTER — PATIENT MESSAGE (OUTPATIENT)
Dept: FAMILY MEDICINE | Facility: CLINIC | Age: 51
End: 2021-07-28

## 2021-07-28 ENCOUNTER — PATIENT MESSAGE (OUTPATIENT)
Dept: ADMINISTRATIVE | Facility: HOSPITAL | Age: 51
End: 2021-07-28

## 2021-07-28 RX ORDER — PROMETHAZINE HYDROCHLORIDE AND DEXTROMETHORPHAN HYDROBROMIDE 6.25; 15 MG/5ML; MG/5ML
5 SYRUP ORAL EVERY 6 HOURS PRN
Qty: 118 ML | Refills: 0 | Status: SHIPPED | OUTPATIENT
Start: 2021-07-28 | End: 2021-08-07

## 2021-07-28 RX ORDER — GUAIFENESIN 600 MG/1
600 TABLET, EXTENDED RELEASE ORAL 2 TIMES DAILY
Qty: 20 TABLET | Refills: 0 | COMMUNITY
Start: 2021-07-28 | End: 2021-08-12

## 2021-07-29 ENCOUNTER — PATIENT MESSAGE (OUTPATIENT)
Dept: FAMILY MEDICINE | Facility: CLINIC | Age: 51
End: 2021-07-29

## 2021-07-30 ENCOUNTER — NURSE TRIAGE (OUTPATIENT)
Dept: ADMINISTRATIVE | Facility: CLINIC | Age: 51
End: 2021-07-30

## 2021-08-12 ENCOUNTER — OFFICE VISIT (OUTPATIENT)
Dept: FAMILY MEDICINE | Facility: CLINIC | Age: 51
End: 2021-08-12
Payer: COMMERCIAL

## 2021-08-12 DIAGNOSIS — U07.1 PNEUMONIA DUE TO COVID-19 VIRUS: ICD-10-CM

## 2021-08-12 DIAGNOSIS — Z09 HOSPITAL DISCHARGE FOLLOW-UP: Primary | ICD-10-CM

## 2021-08-12 DIAGNOSIS — J12.82 PNEUMONIA DUE TO COVID-19 VIRUS: ICD-10-CM

## 2021-08-12 PROCEDURE — 99214 OFFICE O/P EST MOD 30 MIN: CPT | Mod: 95,,, | Performed by: NURSE PRACTITIONER

## 2021-08-12 PROCEDURE — 99214 PR OFFICE/OUTPT VISIT, EST, LEVL IV, 30-39 MIN: ICD-10-PCS | Mod: 95,,, | Performed by: NURSE PRACTITIONER

## 2021-08-12 RX ORDER — ONDANSETRON 4 MG/1
8 TABLET, ORALLY DISINTEGRATING ORAL EVERY 8 HOURS PRN
Qty: 30 TABLET | Refills: 0 | Status: SHIPPED | OUTPATIENT
Start: 2021-08-12

## 2021-08-17 ENCOUNTER — PATIENT MESSAGE (OUTPATIENT)
Dept: FAMILY MEDICINE | Facility: CLINIC | Age: 51
End: 2021-08-17

## 2021-08-25 ENCOUNTER — PATIENT OUTREACH (OUTPATIENT)
Dept: ADMINISTRATIVE | Facility: OTHER | Age: 51
End: 2021-08-25

## 2021-08-27 ENCOUNTER — TELEPHONE (OUTPATIENT)
Dept: BARIATRICS | Facility: CLINIC | Age: 51
End: 2021-08-27

## 2021-09-13 ENCOUNTER — HOSPITAL ENCOUNTER (OUTPATIENT)
Dept: RADIOLOGY | Facility: HOSPITAL | Age: 51
Discharge: HOME | End: 2021-09-13
Attending: NURSE PRACTITIONER
Payer: COMMERCIAL

## 2021-09-13 DIAGNOSIS — U07.1 PNEUMONIA DUE TO COVID-19 VIRUS: ICD-10-CM

## 2021-09-13 DIAGNOSIS — Z09 HOSPITAL DISCHARGE FOLLOW-UP: ICD-10-CM

## 2021-09-13 DIAGNOSIS — J12.82 PNEUMONIA DUE TO COVID-19 VIRUS: ICD-10-CM

## 2021-09-13 PROCEDURE — 71046 XR CHEST PA AND LATERAL: ICD-10-PCS | Mod: 26,,, | Performed by: RADIOLOGY

## 2021-09-13 PROCEDURE — 71046 X-RAY EXAM CHEST 2 VIEWS: CPT | Mod: 26,,, | Performed by: RADIOLOGY

## 2021-09-13 PROCEDURE — 71046 X-RAY EXAM CHEST 2 VIEWS: CPT | Mod: TC,PO

## 2021-09-17 ENCOUNTER — TELEPHONE (OUTPATIENT)
Dept: BARIATRICS | Facility: CLINIC | Age: 51
End: 2021-09-17

## 2021-09-20 ENCOUNTER — TELEPHONE (OUTPATIENT)
Dept: BARIATRICS | Facility: CLINIC | Age: 51
End: 2021-09-20

## 2021-10-13 DIAGNOSIS — Z12.31 OTHER SCREENING MAMMOGRAM: ICD-10-CM

## 2021-10-19 ENCOUNTER — PATIENT MESSAGE (OUTPATIENT)
Dept: FAMILY MEDICINE | Facility: CLINIC | Age: 51
End: 2021-10-19

## 2021-10-26 ENCOUNTER — HOSPITAL ENCOUNTER (OUTPATIENT)
Dept: RADIOLOGY | Facility: HOSPITAL | Age: 51
Discharge: HOME | End: 2021-10-26
Attending: INTERNAL MEDICINE
Payer: COMMERCIAL

## 2021-10-26 VITALS — BODY MASS INDEX: 51.11 KG/M2 | WEIGHT: 277.75 LBS | HEIGHT: 62 IN

## 2021-10-26 DIAGNOSIS — Z12.31 OTHER SCREENING MAMMOGRAM: ICD-10-CM

## 2021-10-26 PROCEDURE — 77063 MAMMO DIGITAL SCREENING BILAT WITH TOMO: ICD-10-PCS | Mod: 26,,, | Performed by: RADIOLOGY

## 2021-10-26 PROCEDURE — 77067 MAMMO DIGITAL SCREENING BILAT WITH TOMO: ICD-10-PCS | Mod: 26,,, | Performed by: RADIOLOGY

## 2021-10-26 PROCEDURE — 77067 SCR MAMMO BI INCL CAD: CPT | Mod: 26,,, | Performed by: RADIOLOGY

## 2021-10-26 PROCEDURE — 77063 BREAST TOMOSYNTHESIS BI: CPT | Mod: 26,,, | Performed by: RADIOLOGY

## 2021-10-26 PROCEDURE — 77067 SCR MAMMO BI INCL CAD: CPT | Mod: TC,PO

## 2021-10-27 ENCOUNTER — OFFICE VISIT (OUTPATIENT)
Dept: FAMILY MEDICINE | Facility: CLINIC | Age: 51
End: 2021-10-27
Payer: COMMERCIAL

## 2021-10-27 VITALS
TEMPERATURE: 98 F | BODY MASS INDEX: 52.48 KG/M2 | HEART RATE: 69 BPM | WEIGHT: 285.19 LBS | DIASTOLIC BLOOD PRESSURE: 86 MMHG | SYSTOLIC BLOOD PRESSURE: 138 MMHG | HEIGHT: 62 IN | OXYGEN SATURATION: 97 %

## 2021-10-27 DIAGNOSIS — L03.116 CELLULITIS OF LEFT LOWER EXTREMITY WITHOUT FOOT: Primary | ICD-10-CM

## 2021-10-27 DIAGNOSIS — R60.0 BILATERAL LOWER EXTREMITY EDEMA: ICD-10-CM

## 2021-10-27 PROCEDURE — 99999 PR PBB SHADOW E&M-EST. PATIENT-LVL IV: CPT | Mod: PBBFAC,,, | Performed by: NURSE PRACTITIONER

## 2021-10-27 PROCEDURE — 99999 PR PBB SHADOW E&M-EST. PATIENT-LVL IV: ICD-10-PCS | Mod: PBBFAC,,, | Performed by: NURSE PRACTITIONER

## 2021-10-27 PROCEDURE — 99214 PR OFFICE/OUTPT VISIT, EST, LEVL IV, 30-39 MIN: ICD-10-PCS | Mod: S$GLB,,, | Performed by: NURSE PRACTITIONER

## 2021-10-27 PROCEDURE — 99214 OFFICE O/P EST MOD 30 MIN: CPT | Mod: S$GLB,,, | Performed by: NURSE PRACTITIONER

## 2021-10-27 RX ORDER — HYDROCHLOROTHIAZIDE 12.5 MG/1
12.5 TABLET ORAL DAILY PRN
Qty: 30 TABLET | Refills: 2 | Status: SHIPPED | OUTPATIENT
Start: 2021-10-27 | End: 2022-02-14

## 2021-10-27 RX ORDER — AMOXICILLIN AND CLAVULANATE POTASSIUM 875; 125 MG/1; MG/1
1 TABLET, FILM COATED ORAL EVERY 12 HOURS
Qty: 20 TABLET | Refills: 0 | Status: SHIPPED | OUTPATIENT
Start: 2021-10-27 | End: 2021-11-11

## 2021-11-11 ENCOUNTER — LAB VISIT (OUTPATIENT)
Dept: LAB | Facility: HOSPITAL | Age: 51
End: 2021-11-11
Attending: NURSE PRACTITIONER
Payer: COMMERCIAL

## 2021-11-11 ENCOUNTER — OFFICE VISIT (OUTPATIENT)
Dept: FAMILY MEDICINE | Facility: CLINIC | Age: 51
End: 2021-11-11
Payer: COMMERCIAL

## 2021-11-11 VITALS
BODY MASS INDEX: 52.85 KG/M2 | HEIGHT: 62 IN | DIASTOLIC BLOOD PRESSURE: 74 MMHG | HEART RATE: 69 BPM | WEIGHT: 287.19 LBS | SYSTOLIC BLOOD PRESSURE: 132 MMHG | OXYGEN SATURATION: 97 % | TEMPERATURE: 99 F

## 2021-11-11 DIAGNOSIS — Z11.9 SCREENING EXAMINATION FOR UNSPECIFIED INFECTIOUS DISEASE: ICD-10-CM

## 2021-11-11 DIAGNOSIS — R60.0 BILATERAL EDEMA OF LOWER EXTREMITY: Primary | ICD-10-CM

## 2021-11-11 DIAGNOSIS — R60.0 BILATERAL EDEMA OF LOWER EXTREMITY: ICD-10-CM

## 2021-11-11 DIAGNOSIS — B37.31 VAGINAL YEAST INFECTION: ICD-10-CM

## 2021-11-11 LAB
ANION GAP SERPL CALC-SCNC: 9 MMOL/L (ref 8–16)
BNP SERPL-MCNC: 28 PG/ML (ref 0–99)
BUN SERPL-MCNC: 10 MG/DL (ref 6–20)
CALCIUM SERPL-MCNC: 9.3 MG/DL (ref 8.7–10.5)
CHLORIDE SERPL-SCNC: 108 MMOL/L (ref 95–110)
CO2 SERPL-SCNC: 26 MMOL/L (ref 23–29)
CREAT SERPL-MCNC: 0.6 MG/DL (ref 0.5–1.4)
EST. GFR  (AFRICAN AMERICAN): >60 ML/MIN/1.73 M^2
EST. GFR  (NON AFRICAN AMERICAN): >60 ML/MIN/1.73 M^2
GLUCOSE SERPL-MCNC: 77 MG/DL (ref 70–110)
POTASSIUM SERPL-SCNC: 4.2 MMOL/L (ref 3.5–5.1)
SODIUM SERPL-SCNC: 143 MMOL/L (ref 136–145)

## 2021-11-11 PROCEDURE — 99999 PR PBB SHADOW E&M-EST. PATIENT-LVL III: CPT | Mod: PBBFAC,,, | Performed by: NURSE PRACTITIONER

## 2021-11-11 PROCEDURE — 99214 OFFICE O/P EST MOD 30 MIN: CPT | Mod: S$GLB,,, | Performed by: NURSE PRACTITIONER

## 2021-11-11 PROCEDURE — 99214 PR OFFICE/OUTPT VISIT, EST, LEVL IV, 30-39 MIN: ICD-10-PCS | Mod: S$GLB,,, | Performed by: NURSE PRACTITIONER

## 2021-11-11 PROCEDURE — 83880 ASSAY OF NATRIURETIC PEPTIDE: CPT | Performed by: NURSE PRACTITIONER

## 2021-11-11 PROCEDURE — 80048 BASIC METABOLIC PNL TOTAL CA: CPT | Performed by: NURSE PRACTITIONER

## 2021-11-11 PROCEDURE — 99999 PR PBB SHADOW E&M-EST. PATIENT-LVL III: ICD-10-PCS | Mod: PBBFAC,,, | Performed by: NURSE PRACTITIONER

## 2021-11-11 PROCEDURE — 87389 HIV-1 AG W/HIV-1&-2 AB AG IA: CPT | Performed by: NURSE PRACTITIONER

## 2021-11-11 PROCEDURE — 86803 HEPATITIS C AB TEST: CPT | Performed by: NURSE PRACTITIONER

## 2021-11-11 PROCEDURE — 36415 COLL VENOUS BLD VENIPUNCTURE: CPT | Mod: PO | Performed by: NURSE PRACTITIONER

## 2021-11-11 RX ORDER — FUROSEMIDE 40 MG/1
40 TABLET ORAL EVERY MORNING
Qty: 10 TABLET | Refills: 0 | Status: SHIPPED | OUTPATIENT
Start: 2021-11-11 | End: 2022-02-17 | Stop reason: SDUPTHER

## 2021-11-11 RX ORDER — FLUCONAZOLE 200 MG/1
TABLET ORAL
Qty: 2 TABLET | Refills: 0 | Status: SHIPPED | OUTPATIENT
Start: 2021-11-11 | End: 2022-02-17

## 2021-11-12 LAB
HCV AB SERPL QL IA: NEGATIVE
HIV 1+2 AB+HIV1 P24 AG SERPL QL IA: NEGATIVE

## 2021-11-29 ENCOUNTER — OFFICE VISIT (OUTPATIENT)
Dept: FAMILY MEDICINE | Facility: CLINIC | Age: 51
End: 2021-11-29
Payer: COMMERCIAL

## 2021-11-29 VITALS
BODY MASS INDEX: 40.23 KG/M2 | DIASTOLIC BLOOD PRESSURE: 78 MMHG | HEIGHT: 70 IN | TEMPERATURE: 98 F | HEART RATE: 67 BPM | WEIGHT: 281 LBS | SYSTOLIC BLOOD PRESSURE: 127 MMHG

## 2021-11-29 DIAGNOSIS — R60.0 BILATERAL EDEMA OF LOWER EXTREMITY: Primary | ICD-10-CM

## 2021-11-29 PROCEDURE — 99214 PR OFFICE/OUTPT VISIT, EST, LEVL IV, 30-39 MIN: ICD-10-PCS | Mod: S$GLB,,, | Performed by: NURSE PRACTITIONER

## 2021-11-29 PROCEDURE — 99999 PR PBB SHADOW E&M-EST. PATIENT-LVL IV: CPT | Mod: PBBFAC,,, | Performed by: NURSE PRACTITIONER

## 2021-11-29 PROCEDURE — 99214 OFFICE O/P EST MOD 30 MIN: CPT | Mod: S$GLB,,, | Performed by: NURSE PRACTITIONER

## 2021-11-29 PROCEDURE — 99999 PR PBB SHADOW E&M-EST. PATIENT-LVL IV: ICD-10-PCS | Mod: PBBFAC,,, | Performed by: NURSE PRACTITIONER

## 2021-11-30 ENCOUNTER — HOSPITAL ENCOUNTER (OUTPATIENT)
Dept: RADIOLOGY | Facility: HOSPITAL | Age: 51
Discharge: HOME | End: 2021-11-30
Attending: NURSE PRACTITIONER
Payer: COMMERCIAL

## 2021-11-30 DIAGNOSIS — R60.0 BILATERAL EDEMA OF LOWER EXTREMITY: ICD-10-CM

## 2021-11-30 PROCEDURE — 93970 EXTREMITY STUDY: CPT | Mod: TC,PO

## 2021-11-30 PROCEDURE — 93970 US LOWER EXTREMITY VEINS BILATERAL INSUFFICIENCY: ICD-10-PCS | Mod: 26,,, | Performed by: RADIOLOGY

## 2021-11-30 PROCEDURE — 93970 EXTREMITY STUDY: CPT | Mod: 26,,, | Performed by: RADIOLOGY

## 2022-02-12 ENCOUNTER — PATIENT MESSAGE (OUTPATIENT)
Dept: FAMILY MEDICINE | Facility: CLINIC | Age: 52
End: 2022-02-12
Payer: COMMERCIAL

## 2022-02-17 ENCOUNTER — OFFICE VISIT (OUTPATIENT)
Dept: FAMILY MEDICINE | Facility: CLINIC | Age: 52
End: 2022-02-17
Payer: COMMERCIAL

## 2022-02-17 VITALS
SYSTOLIC BLOOD PRESSURE: 128 MMHG | BODY MASS INDEX: 41.39 KG/M2 | DIASTOLIC BLOOD PRESSURE: 88 MMHG | HEIGHT: 70 IN | HEART RATE: 82 BPM | WEIGHT: 289.13 LBS | OXYGEN SATURATION: 98 %

## 2022-02-17 DIAGNOSIS — Z01.89 ENCOUNTER FOR LABORATORY EXAMINATION: ICD-10-CM

## 2022-02-17 DIAGNOSIS — R60.0 BILATERAL EDEMA OF LOWER EXTREMITY: Primary | ICD-10-CM

## 2022-02-17 PROCEDURE — 99999 PR PBB SHADOW E&M-EST. PATIENT-LVL IV: ICD-10-PCS | Mod: PBBFAC,,, | Performed by: NURSE PRACTITIONER

## 2022-02-17 PROCEDURE — 99214 PR OFFICE/OUTPT VISIT, EST, LEVL IV, 30-39 MIN: ICD-10-PCS | Mod: S$GLB,,, | Performed by: NURSE PRACTITIONER

## 2022-02-17 PROCEDURE — 99214 OFFICE O/P EST MOD 30 MIN: CPT | Mod: S$GLB,,, | Performed by: NURSE PRACTITIONER

## 2022-02-17 PROCEDURE — 99999 PR PBB SHADOW E&M-EST. PATIENT-LVL IV: CPT | Mod: PBBFAC,,, | Performed by: NURSE PRACTITIONER

## 2022-02-17 RX ORDER — FUROSEMIDE 40 MG/1
40 TABLET ORAL EVERY MORNING
Qty: 10 TABLET | Refills: 0 | Status: SHIPPED | OUTPATIENT
Start: 2022-02-17 | End: 2022-06-06

## 2022-02-17 NOTE — PROGRESS NOTES
Assessment/Plan:  Problem List Items Addressed This Visit        Other    Bilateral edema of lower extremity - Primary    Overview      Patient is a 51-year-old female who presents in clinic today as an established patient here for bilateral lower extremity swelling. She reports this is a chronic problem. Progressively worsening over the last 4 months. She reports that she has been taking HCTZ, wearing compression socks, and eating low sodium diet. She has had no chest pain, dizziness, shortness of breath, palpitations. She has had a BLE ultrasound 11/2021. Reviewed results with patient. Suspect venous insufficiency. Will rule out cardiac origin.      US Lower Extremity Veins Bilateral Insufficiency    Dx: Bilateral edema of lower extremity    Narrative & Impression  EXAMINATION:  US LOWER EXTREMITY VEINS BILATERAL INSUFFICIENCY     CLINICAL HISTORY:  Localized edema     TECHNIQUE:  Bilateral lower extremity venous Doppler exam including Valsalva or standing maneuvers for evaluation of venous insufficiency of the superficial systems bilaterally.  Reflux times greater than 500 ms were considered indicative of superficial system reflux.     COMPARISON:  January 24, 2019.     FINDINGS:  There is normal compressibility to both common femoral, femoral and popliteal deep veins.     Superficial varicosities are seen in the right thigh.  Motor likely collaterals are seen along the course of the right greater saphenous vein with a diameter ranging between 4 and 8 mm.  Reflux using the definition of 500 milliseconds is not seen in these vessels.  The right small saphenous vein demonstrates a diameter of 3 mm and does not demonstrate evidence of reflux.  On the left the proximal greater saphenous is seen and measures 8 mm.  This appears to connect to multiple varicosities in the region of the thigh and knee with a diameter as large as 7 mm.  Reflux is not seen using the definition of 500 milliseconds.  Small saphenous measures  4 mm and does demonstrate reflux with a time of 1841 milliseconds.     Impression:     Reported history of bilateral greater saphenous ablation with what is likely collateral varicosities seen in both thighs and along the left knee.     Reflux is seen in the left small saphenous system as is described above.    Electronically signed by: Jerzy Fuller MD  Date: 11/30/2021  Time: 16:17     Wt Readings from Last 3 Encounters:   02/17/22 0915 131.1 kg (289 lb 1.6 oz)   11/29/21 0706 127.5 kg (281 lb)   11/11/21 0720 130.3 kg (287 lb 3.2 oz)     Will update labs, check BNP. Check Echo. Restart Lasix, stop HCTZ. Referral to vascular surgery placed.          Relevant Medications    furosemide (LASIX) 40 MG tablet    Other Relevant Orders    Echo    BNP    Ambulatory referral/consult to Vascular Surgery      Other Visit Diagnoses     Encounter for laboratory examination        Relevant Orders    Hemoglobin A1C    Comprehensive Metabolic Panel    Lipid Panel    CBC Without Differential    TSH        Follow up in about 1 week (around 2/24/2022), or if symptoms worsen or fail to improve, for Follow up with PCP.    Ashley Matos NP  _____________________________________________________________________________________________________________________________________________________    CC: swelling     HPI: Patient is a 51-year-old female who presents in clinic today as an established patient here for bilateral lower extremity swelling. She reports this is a chronic problem. Progressively worsening over the last 4 months. She reports that she has been taking HCTZ, wearing compression socks, and eating low sodium diet. She has had no chest pain, dizziness, shortness of breath, palpitations. She has had a BLE ultrasound 11/2021. Reviewed results with patient. Suspect venous insufficiency. Will rule out cardiac origin.      US Lower Extremity Veins Bilateral Insufficiency    Dx: Bilateral edema of lower extremity    Narrative &  Impression  EXAMINATION:  US LOWER EXTREMITY VEINS BILATERAL INSUFFICIENCY     CLINICAL HISTORY:  Localized edema     TECHNIQUE:  Bilateral lower extremity venous Doppler exam including Valsalva or standing maneuvers for evaluation of venous insufficiency of the superficial systems bilaterally.  Reflux times greater than 500 ms were considered indicative of superficial system reflux.     COMPARISON:  January 24, 2019.     FINDINGS:  There is normal compressibility to both common femoral, femoral and popliteal deep veins.     Superficial varicosities are seen in the right thigh.  Motor likely collaterals are seen along the course of the right greater saphenous vein with a diameter ranging between 4 and 8 mm.  Reflux using the definition of 500 milliseconds is not seen in these vessels.  The right small saphenous vein demonstrates a diameter of 3 mm and does not demonstrate evidence of reflux.  On the left the proximal greater saphenous is seen and measures 8 mm.  This appears to connect to multiple varicosities in the region of the thigh and knee with a diameter as large as 7 mm.  Reflux is not seen using the definition of 500 milliseconds.  Small saphenous measures 4 mm and does demonstrate reflux with a time of 1841 milliseconds.     Impression:     Reported history of bilateral greater saphenous ablation with what is likely collateral varicosities seen in both thighs and along the left knee.     Reflux is seen in the left small saphenous system as is described above.    Electronically signed by: Jerzy Fuller MD  Date: 11/30/2021  Time: 16:17     Wt Readings from Last 3 Encounters:   02/17/22 0915 131.1 kg (289 lb 1.6 oz)   11/29/21 0706 127.5 kg (281 lb)   11/11/21 0720 130.3 kg (287 lb 3.2 oz)     Past Medical History:  Past Medical History:   Diagnosis Date    H/O hysterectomy for benign disease with BSO 2/2/2017    Morbid obesity     Varicose veins     Vitamin D deficiency      Past Surgical History:    Procedure Laterality Date     SECTION      CHOLECYSTECTOMY      COLONOSCOPY N/A 2020    Procedure: COLONOSCOPY;  Surgeon: Sean Ward MD;  Location: [facility] (2ND FLR);  Service: Endoscopy;  Laterality: N/A;  screening colonooscpy, 49 years old.  siblings with colon polyps   - pt updated on visitor policy and drop off location.  COVID screening ordered for  at [facility] [place] - rb    ESOPHAGOGASTRODUODENOSCOPY N/A 2020    Procedure: ESOPHAGOGASTRODUODENOSCOPY (EGD);  Surgeon: Sean Ward MD;  Location: [facility] (2ND FLR);  Service: Endoscopy;  Laterality: N/A;  BMI 51.77  Re-scheduled per md recommendations-new instructions mailed-tb    GASTRIC BYPASS      ZACK FILTER PLACEMENT      zack filter removal      HYSTERECTOMY      DANNA with BSO;    lap band removal       LAPAROSCOPIC GASTRIC BANDING      OOPHORECTOMY      w/hysteretomy @ age 41    TONSILLECTOMY      VASCULAR SURGERY       Review of patient's allergies indicates:   Allergen Reactions    Brompheniramin-phenylephrin-dm      Other reaction(s): Other (See Comments)  Per patient, caused dizziness + blurry vision     Erythromycin Rash    Erythromycin (bulk) Rash     Social History     Tobacco Use    Smoking status: Former Smoker     Packs/day: 0.00     Years: 0.00     Pack years: 0.00     Start date: 1995     Quit date:      Years since quittin.6    Smokeless tobacco: Never Used   Substance Use Topics    Alcohol use: Yes     Alcohol/week: 1.0 standard drink     Types: 1 Glasses of wine per week     Comment: socially    Drug use: No     Family History   Problem Relation Age of Onset    Aortic aneurysm Mother     Diabetes Mother     Cancer Father         lung cancer/quit tobacco earlier;    Cancer Brother         melanoma;    COPD Brother         emphysema; quit tobacco;    Drug abuse Brother     Aortic aneurysm Brother     Dementia Brother         hx of hypoxia s/p  aspiration PNA    Hyperlipidemia Sister     Hypertension Sister     BAYLEE disease Sister     Rheum arthritis Sister     Aortic aneurysm Sister     Drug abuse Brother      Current Outpatient Medications on File Prior to Visit   Medication Sig Dispense Refill    acetaminophen (TYLENOL) 500 MG tablet Take 1 tablet (500 mg total) by mouth every 6 (six) hours as needed for Pain.  0    cyanocobalamin, vitamin B-12, 2,500 mcg Subl Place 1 tablet under the tongue once a week.      docusate sodium (COLACE) 100 MG capsule Take 1 capsule (100 mg total) by mouth 2 (two) times daily.  0    ergocalciferol (ERGOCALCIFEROL) 50,000 unit Cap Take 1 capsule (50,000 Units total) by mouth twice a week. 24 capsule 0    fluticasone propionate (FLONASE) 50 mcg/actuation nasal spray Use 2 sprays to each nostril daily 16 g 12    multivitamin capsule Take 3 capsules by mouth 3 (three) times daily.      omeprazole (PRILOSEC) 40 MG capsule Take 1 capsule (40 mg total) by mouth every morning. 180 capsule 0    ondansetron (ZOFRAN-ODT) 4 MG TbDL Take 2 tablets (8 mg total) by mouth every 8 (eight) hours as needed (nausea). 30 tablet 0    [DISCONTINUED] fluconazole (DIFLUCAN) 200 MG Tab Take 1 tablet PO today, repeat in 3 days 2 tablet 0    [DISCONTINUED] hydroCHLOROthiazide (HYDRODIURIL) 12.5 MG Tab TAKE 1 TABLET BY MOUTH EVERY DAY AS NEEDED FOR SWELLING 90 tablet 0    nystatin (NYSTOP) powder Apply topically 3 (three) times daily. Apply to affected area 1 Bottle 3    [DISCONTINUED] furosemide (LASIX) 40 MG tablet Take 1 tablet (40 mg total) by mouth every morning. for 10 days (Patient not taking: No sig reported) 10 tablet 0     No current facility-administered medications on file prior to visit.       Review of Systems   Constitutional: Positive for unexpected weight change. Negative for activity change, fatigue and fever.   HENT: Negative for ear pain, hearing loss, rhinorrhea, sore throat and trouble swallowing.    Eyes:  "Negative for pain, discharge and visual disturbance.   Respiratory: Negative for cough, chest tightness, shortness of breath and wheezing.    Cardiovascular: Positive for leg swelling. Negative for chest pain and palpitations.   Gastrointestinal: Negative for abdominal pain, blood in stool, constipation, diarrhea and vomiting.   Endocrine: Negative for polydipsia and polyuria.   Genitourinary: Negative for difficulty urinating, dysuria, hematuria and menstrual problem.   Musculoskeletal: Negative for arthralgias, joint swelling, myalgias and neck pain.   Skin: Negative for color change and rash.   Neurological: Negative for dizziness, weakness and headaches.   Psychiatric/Behavioral: Negative for confusion and sleep disturbance. The patient is not nervous/anxious.        Vitals:    22 0915   BP: 128/88   BP Location: Left arm   Pulse: 82   SpO2: 98%   Weight: 131.1 kg (289 lb 1.6 oz)   Height: 5' 10" (1.778 m)       Wt Readings from Last 3 Encounters:   22 131.1 kg (289 lb 1.6 oz)   21 127.5 kg (281 lb)   21 130.3 kg (287 lb 3.2 oz)       Physical Exam  Vitals and nursing note reviewed.   Constitutional:       General: She is not in acute distress.     Appearance: She is well-developed. She is obese.   HENT:      Head: Normocephalic and atraumatic.   Eyes:      Pupils: Pupils are equal, round, and reactive to light.   Cardiovascular:      Rate and Rhythm: Normal rate and regular rhythm.   Pulmonary:      Effort: Pulmonary effort is normal.      Breath sounds: Normal breath sounds.   Musculoskeletal:         General: Normal range of motion.      Cervical back: Normal range of motion and neck supple.      Right lower le+ Pitting Edema present.      Left lower le+ Pitting Edema present.   Skin:     General: Skin is warm and dry.      Findings: No rash.   Neurological:      Mental Status: She is alert and oriented to person, place, and time.   Psychiatric:         Judgment: Judgment " normal.         Health Maintenance   Topic Date Due    Mammogram  10/26/2022    Lipid Panel  09/18/2026    TETANUS VACCINE  01/03/2029    Hepatitis C Screening  Completed

## 2022-02-18 ENCOUNTER — PATIENT MESSAGE (OUTPATIENT)
Dept: FAMILY MEDICINE | Facility: CLINIC | Age: 52
End: 2022-02-18
Payer: COMMERCIAL

## 2022-02-18 ENCOUNTER — HOSPITAL ENCOUNTER (OUTPATIENT)
Dept: CARDIOLOGY | Facility: HOSPITAL | Age: 52
Discharge: HOME | End: 2022-02-18
Attending: NURSE PRACTITIONER
Payer: COMMERCIAL

## 2022-02-18 VITALS
DIASTOLIC BLOOD PRESSURE: 88 MMHG | WEIGHT: 289 LBS | SYSTOLIC BLOOD PRESSURE: 128 MMHG | HEART RATE: 67 BPM | HEIGHT: 70 IN | BODY MASS INDEX: 41.37 KG/M2

## 2022-02-18 DIAGNOSIS — R60.0 BILATERAL EDEMA OF LOWER EXTREMITY: Primary | ICD-10-CM

## 2022-02-18 DIAGNOSIS — R60.0 BILATERAL EDEMA OF LOWER EXTREMITY: ICD-10-CM

## 2022-02-18 LAB
AORTIC ROOT ANNULUS: 3.63 CM
AV INDEX (PROSTH): 0.6
AV MEAN GRADIENT: 5 MMHG
AV PEAK GRADIENT: 8 MMHG
AV VALVE AREA: 2.34 CM2
AV VELOCITY RATIO: 0.56
BSA FOR ECHO PROCEDURE: 2.54 M2
CV ECHO LV RWT: 0.54 CM
DOP CALC AO PEAK VEL: 1.42 M/S
DOP CALC AO VTI: 36.5 CM
DOP CALC LVOT AREA: 3.9 CM2
DOP CALC LVOT DIAMETER: 2.23 CM
DOP CALC LVOT PEAK VEL: 0.8 M/S
DOP CALC LVOT STROKE VOLUME: 85.49 CM3
DOP CALC RVOT PEAK VEL: 0.71 M/S
DOP CALC RVOT VTI: 19.2 CM
DOP CALCLVOT PEAK VEL VTI: 21.9 CM
E WAVE DECELERATION TIME: 232.35 MSEC
E/A RATIO: 0.65
E/E' RATIO: 8.24 M/S
ECHO EF ESTIMATED: 59 %
ECHO LV POSTERIOR WALL: 1.16 CM (ref 0.6–1.1)
EJECTION FRACTION: 55 %
FRACTIONAL SHORTENING: 31 % (ref 28–44)
INTERVENTRICULAR SEPTUM: 1.27 CM (ref 0.6–1.1)
IVRT: 142.72 MSEC
LA MAJOR: 6.21 CM
LA MINOR: 5.98 CM
LA WIDTH: 4.51 CM
LEFT ATRIUM SIZE: 3.8 CM
LEFT ATRIUM VOLUME INDEX MOD: 28.1 ML/M2
LEFT ATRIUM VOLUME INDEX: 36.4 ML/M2
LEFT ATRIUM VOLUME MOD: 68.54 CM3
LEFT ATRIUM VOLUME: 88.76 CM3
LEFT INTERNAL DIMENSION IN SYSTOLE: 2.96 CM (ref 2.1–4)
LEFT VENTRICLE DIASTOLIC VOLUME INDEX: 33.71 ML/M2
LEFT VENTRICLE DIASTOLIC VOLUME: 82.25 ML
LEFT VENTRICLE MASS INDEX: 77 G/M2
LEFT VENTRICLE SYSTOLIC VOLUME INDEX: 13.9 ML/M2
LEFT VENTRICLE SYSTOLIC VOLUME: 33.99 ML
LEFT VENTRICULAR INTERNAL DIMENSION IN DIASTOLE: 4.28 CM (ref 3.5–6)
LEFT VENTRICULAR MASS: 186.73 G
LV LATERAL E/E' RATIO: 8.75 M/S
LV SEPTAL E/E' RATIO: 7.78 M/S
LVOT MG: 1.54 MMHG
LVOT MV: 0.6 CM/S
MV PEAK A VEL: 1.08 M/S
MV PEAK E VEL: 0.7 M/S
MV STENOSIS PRESSURE HALF TIME: 67.38 MS
MV VALVE AREA P 1/2 METHOD: 3.26 CM2
PISA TR MAX VEL: 2.61 M/S
PULM VEIN S/D RATIO: 1.63
PV MEAN GRADIENT: 1.37 MMHG
PV PEAK D VEL: 0.19 M/S
PV PEAK S VEL: 0.31 M/S
PV PEAK VELOCITY: 0.84 CM/S
RA MAJOR: 5.01 CM
RA PRESSURE: 3 MMHG
RA WIDTH: 3.68 CM
RIGHT VENTRICULAR END-DIASTOLIC DIMENSION: 2.33 CM
SINUS: 2.96 CM
STJ: 3.11 CM
TDI LATERAL: 0.08 M/S
TDI SEPTAL: 0.09 M/S
TDI: 0.09 M/S
TR MAX PG: 27 MMHG
TRICUSPID ANNULAR PLANE SYSTOLIC EXCURSION: 2.81 CM
TV REST PULMONARY ARTERY PRESSURE: 30 MMHG

## 2022-02-18 PROCEDURE — 93306 TTE W/DOPPLER COMPLETE: CPT | Mod: PO

## 2022-02-18 PROCEDURE — 93306 ECHO (CUPID ONLY): ICD-10-PCS | Mod: 26,,, | Performed by: INTERNAL MEDICINE

## 2022-02-18 PROCEDURE — 93306 TTE W/DOPPLER COMPLETE: CPT | Mod: 26,,, | Performed by: INTERNAL MEDICINE

## 2022-02-24 ENCOUNTER — TELEPHONE (OUTPATIENT)
Dept: VASCULAR SURGERY | Facility: CLINIC | Age: 52
End: 2022-02-24
Payer: COMMERCIAL

## 2022-02-24 NOTE — TELEPHONE ENCOUNTER
----- Message from Darleen Narvaez LPN sent at 2/24/2022  1:33 PM CST -----  Regarding: Referral  Good afternoon,    Pt is requesting to be seen in [place]. Does Dr. Phan see pt's with varicose veins/swelling?    If so could you reach out to her to schedule appt?    Thank you,  Darleen

## 2022-02-24 NOTE — TELEPHONE ENCOUNTER
Contacted patient in response to message. Explained to patient that Dr. Duncan does not see patients for leg swelling but that she can be seen at [facility]. Message sent to [facility] vein clinic requesting patient be contacted to schedule appt.----- Message from Johana Balderas sent at 2/24/2022  2:02 PM CST -----  Regarding: pt  Patient Requesting Sooner Appointment.     Reason for sooner appt.: pt is calling to speak with the nurse pt needs to be seen for fluid in the legs pt has been seen by her pcp for the issue and was told to follow up with vascular   When is the first available appointment? N/A   Communication Preference: can you please call pt at [phone]  Additional Information: none    BRANDAN

## 2022-05-27 ENCOUNTER — PATIENT MESSAGE (OUTPATIENT)
Dept: FAMILY MEDICINE | Facility: CLINIC | Age: 52
End: 2022-05-27
Payer: COMMERCIAL

## 2022-06-06 ENCOUNTER — OFFICE VISIT (OUTPATIENT)
Dept: FAMILY MEDICINE | Facility: CLINIC | Age: 52
End: 2022-06-06
Payer: COMMERCIAL

## 2022-06-06 VITALS
TEMPERATURE: 98 F | WEIGHT: 283.06 LBS | SYSTOLIC BLOOD PRESSURE: 116 MMHG | DIASTOLIC BLOOD PRESSURE: 76 MMHG | BODY MASS INDEX: 52.09 KG/M2 | HEART RATE: 83 BPM | HEIGHT: 62 IN

## 2022-06-06 DIAGNOSIS — F41.1 GAD (GENERALIZED ANXIETY DISORDER): Primary | ICD-10-CM

## 2022-06-06 DIAGNOSIS — H81.10 BENIGN PAROXYSMAL POSITIONAL VERTIGO, UNSPECIFIED LATERALITY: ICD-10-CM

## 2022-06-06 PROCEDURE — 99999 PR PBB SHADOW E&M-EST. PATIENT-LVL IV: CPT | Mod: PBBFAC,,, | Performed by: INTERNAL MEDICINE

## 2022-06-06 PROCEDURE — 99214 PR OFFICE/OUTPT VISIT, EST, LEVL IV, 30-39 MIN: ICD-10-PCS | Mod: S$GLB,,, | Performed by: INTERNAL MEDICINE

## 2022-06-06 PROCEDURE — 99999 PR PBB SHADOW E&M-EST. PATIENT-LVL IV: ICD-10-PCS | Mod: PBBFAC,,, | Performed by: INTERNAL MEDICINE

## 2022-06-06 PROCEDURE — 99214 OFFICE O/P EST MOD 30 MIN: CPT | Mod: S$GLB,,, | Performed by: INTERNAL MEDICINE

## 2022-06-06 RX ORDER — ESCITALOPRAM OXALATE 5 MG/1
5 TABLET ORAL DAILY
Qty: 30 TABLET | Refills: 1 | Status: SHIPPED | OUTPATIENT
Start: 2022-06-06 | End: 2022-06-28

## 2022-06-06 NOTE — PROGRESS NOTES
Assessment/Plan:    Problem List Items Addressed This Visit        Psychiatric    ANDREA (generalized anxiety disorder) - Primary    Overview     -discussed anxiety condition course  -discussed SSRI/SNRI as first-line treatment for this condition  -plan to start on lexapro 5 mg daily  -discussed risk of discontinuing this medication without tapering  -patient was educated, advised of side effects, and all questions were answered.  Patient voiced understanding  -patient will follow up routinely and notify us if having any side effects or worsening or persistent symptoms.  ER precautions were given.           Relevant Medications    EScitalopram oxalate (LEXAPRO) 5 MG Tab      Other Visit Diagnoses     Benign paroxysmal positional vertigo, unspecified laterality      -symptoms consistent with BPPV  - +tiana-hallpike in clinic  -provided home epley maneuvers  -if symptoms persist, plan to refer for vestibular PT          Follow up in about 4 weeks (around 7/4/2022), or if symptoms worsen or fail to improve, for telemed appt.    Terrie Hartmann MD  _____________________________________________________________________________________________________________________________________________________    CC: dizziness/anxiety    HPI:    Patient is in clinic today as an established patient here for dizziness and anxiety.    Complaint of dizziness. Started about 3 weeks ago. Worse with positional changes. Episodes last about 10-15 seconds. Resolves spontaneously. Denies any recent issues with sinuses/allergies. Denies any new medication. Denies lightheadedness or pre-syncopal symptoms.    Also having anxiety issues. Symptoms of feeling overwhelmed, constant worrying, racing thoughts, inattentiveness. Symptoms present for several months. Worse due to stress at work. She is now interested in starting on medication to help with symptoms. She has never been on medication for anxiety in the past.    No other new complaints today.       Past Medical History:  Past Medical History:   Diagnosis Date    H/O hysterectomy for benign disease with BSO 2017    Morbid obesity     Varicose veins     Vitamin D deficiency      Past Surgical History:   Procedure Laterality Date     SECTION      CHOLECYSTECTOMY      COLONOSCOPY N/A 2020    Procedure: COLONOSCOPY;  Surgeon: Sean Ward MD;  Location: [facility] ([address] [place] [address]);  Service: Endoscopy;  Laterality: N/A;  screening colonooscpy, 49 years old.  siblings with colon polyps   - pt updated on visitor policy and drop off location.  COVID screening ordered for  at [facility] [place] - rb    ESOPHAGOGASTRODUODENOSCOPY N/A 2020    Procedure: ESOPHAGOGASTRODUODENOSCOPY (EGD);  Surgeon: Sean Ward MD;  Location: [facility] ([address] [place] [address]);  Service: Endoscopy;  Laterality: N/A;  BMI 51.77  Re-scheduled per md recommendations-new instructions mailed-tb    GASTRIC BYPASS      ZACK FILTER PLACEMENT      zack filter removal      HYSTERECTOMY      DANNA with BSO;    lap band removal       LAPAROSCOPIC GASTRIC BANDING      OOPHORECTOMY      w/hysteretomy @ age 41    TONSILLECTOMY      VASCULAR SURGERY       Review of patient's allergies indicates:   Allergen Reactions    Brompheniramin-phenylephrin-dm      Other reaction(s): Other (See Comments)  Per patient, caused dizziness + blurry vision     Erythromycin Rash    Erythromycin (bulk) Rash     Social History     Tobacco Use    Smoking status: Former Smoker     Packs/day: 0.00     Years: 0.00     Pack years: 0.00     Start date: 1995     Quit date:      Years since quittin.9    Smokeless tobacco: Never Used   Substance Use Topics    Alcohol use: Yes     Alcohol/week: 1.0 standard drink     Types: 1 Glasses of wine per week     Comment: socially    Drug use: No     Family History   Problem Relation Age of Onset    Aortic aneurysm Mother     Diabetes Mother     Cancer Father         lung  cancer/quit tobacco earlier;    Cancer Brother         melanoma;    COPD Brother         emphysema; quit tobacco;    Drug abuse Brother     Aortic aneurysm Brother     Dementia Brother         hx of hypoxia s/p aspiration PNA    Hyperlipidemia Sister     Hypertension Sister     BAYLEE disease Sister     Rheum arthritis Sister     Aortic aneurysm Sister     Drug abuse Brother      Current Outpatient Medications on File Prior to Visit   Medication Sig Dispense Refill    acetaminophen (TYLENOL) 500 MG tablet Take 1 tablet (500 mg total) by mouth every 6 (six) hours as needed for Pain.  0    cyanocobalamin, vitamin B-12, 2,500 mcg Subl Place 1 tablet under the tongue once a week.      docusate sodium (COLACE) 100 MG capsule Take 1 capsule (100 mg total) by mouth 2 (two) times daily.  0    ergocalciferol (ERGOCALCIFEROL) 50,000 unit Cap Take 1 capsule (50,000 Units total) by mouth twice a week. 24 capsule 0    multivitamin capsule Take 3 capsules by mouth 3 (three) times daily.      nystatin (NYSTOP) powder Apply topically 3 (three) times daily. Apply to affected area 1 Bottle 3    omeprazole (PRILOSEC) 40 MG capsule Take 1 capsule (40 mg total) by mouth every morning. 180 capsule 0    ondansetron (ZOFRAN-ODT) 4 MG TbDL Take 2 tablets (8 mg total) by mouth every 8 (eight) hours as needed (nausea). 30 tablet 0     No current facility-administered medications on file prior to visit.       Review of Systems   Constitutional: Negative for chills, diaphoresis, fatigue and fever.   HENT: Negative for congestion, ear pain, postnasal drip, sinus pain and sore throat.    Eyes: Negative for pain and redness.   Respiratory: Negative for cough, chest tightness and shortness of breath.    Cardiovascular: Negative for chest pain and leg swelling.   Gastrointestinal: Negative for abdominal pain, constipation, diarrhea, nausea and vomiting.   Genitourinary: Negative for dysuria and hematuria.   Musculoskeletal: Negative  "for arthralgias and joint swelling.   Skin: Negative for rash.   Neurological: Positive for dizziness. Negative for syncope, light-headedness and headaches.   Psychiatric/Behavioral: The patient is nervous/anxious.        Vitals:    06/06/22 1538 06/06/22 1542   BP: 130/85 116/76   BP Location: Right arm Right arm   Patient Position: Lying Standing   Pulse: 73 83   Temp: 98.1 °F (36.7 °C)    Weight: 128.4 kg (283 lb 1.1 oz)    Height: 5' 2" (1.575 m)        Wt Readings from Last 3 Encounters:   06/06/22 128.4 kg (283 lb 1.1 oz)   03/30/22 131.6 kg (290 lb 2 oz)   03/16/22 130.6 kg (287 lb 14.7 oz)       Physical Exam  Constitutional:       General: She is not in acute distress.     Appearance: Normal appearance. She is well-developed.   HENT:      Head: Normocephalic and atraumatic.   Eyes:      Conjunctiva/sclera: Conjunctivae normal.   Cardiovascular:      Rate and Rhythm: Normal rate and regular rhythm.      Pulses: Normal pulses.      Heart sounds: Normal heart sounds. No murmur heard.  Pulmonary:      Effort: Pulmonary effort is normal. No respiratory distress.      Breath sounds: Normal breath sounds.   Abdominal:      General: Bowel sounds are normal. There is no distension.      Palpations: Abdomen is soft.      Tenderness: There is no abdominal tenderness.   Musculoskeletal:         General: Normal range of motion.      Cervical back: Normal range of motion and neck supple.   Skin:     General: Skin is warm and dry.      Findings: No rash.   Neurological:      General: No focal deficit present.      Mental Status: She is alert and oriented to person, place, and time.      Comments: +tiana-hallpike   Psychiatric:         Mood and Affect: Mood normal.         Behavior: Behavior normal.         Health Maintenance   Topic Date Due    Mammogram  10/26/2022    Lipid Panel  02/18/2027    TETANUS VACCINE  01/03/2029    Hepatitis C Screening  Completed       "

## 2022-06-07 PROBLEM — F41.1 GAD (GENERALIZED ANXIETY DISORDER): Status: ACTIVE | Noted: 2022-06-07

## 2022-07-08 ENCOUNTER — OFFICE VISIT (OUTPATIENT)
Dept: FAMILY MEDICINE | Facility: CLINIC | Age: 52
End: 2022-07-08
Payer: COMMERCIAL

## 2022-07-08 DIAGNOSIS — F41.1 GAD (GENERALIZED ANXIETY DISORDER): ICD-10-CM

## 2022-07-08 PROCEDURE — 99213 PR OFFICE/OUTPT VISIT, EST, LEVL III, 20-29 MIN: ICD-10-PCS | Mod: 95,,, | Performed by: INTERNAL MEDICINE

## 2022-07-08 PROCEDURE — 99213 OFFICE O/P EST LOW 20 MIN: CPT | Mod: 95,,, | Performed by: INTERNAL MEDICINE

## 2022-07-08 NOTE — PROGRESS NOTES
Primary Care Telemedicine Note  The patient location is:  Patient Home   The chief complaint leading to consultation is: anxiety follow up  Total time spent with patient: 15 min    Visit type: Virtual visit with synchronous audio only and video  Each patient to whom he or she provides medical services by telemedicine is:  (1) informed of the relationship between the physician and patient and the respective role of any other health care provider with respect to management of the patient; and (2) notified that he or she may decline to receive medical services by telemedicine and may withdraw from such care at any time.      Assessment/Plan:    Problem List Items Addressed This Visit        Psychiatric    ANDREA (generalized anxiety disorder)    Overview     -discussed anxiety condition course  -discussed SSRI/SNRI as first-line treatment for this condition  -started on lexapro 5 mg daily  -has had improvement in anxiety symptoms but having fatigue and headache daily since starting  -discussed switching to night time dosing for the next few nights; if no improvement, plan to switch to zoloft 25 mg QHS  -discussed risk of discontinuing this medication without tapering  -patient was educated, advised of side effects, and all questions were answered.  Patient voiced understanding  -patient will follow up routinely and notify us if having any side effects or worsening or persistent symptoms.  ER precautions were given.                 Follow up in about 4 weeks (around 8/5/2022) for telemed appt.    Terrie Hartmann MD  _____________________________________________________________________________________________________________________________________________________    CC: anxiety    HPI:    Patient is an established patient who presents today via virtual visit for anxiety follow.    Patient seen at last visit with complaint of anxiety. Discussed starting on lexapro. Since starting, she has noticed an improvement of her  Requesting Venlafaxine 150mg daily  LOV: last discussed on 2/26/16  RTC: 6 months  Last Labs: n/a  Filled: 3/6/17 #90 with 1 refills    No future appointments.     Patient has been seen since 2/26/16 to discuss this med, can we call her to schedule a follow anxiety symptoms, however she has had some fatigue and a generalized headache since she started. Denies any other AE of the medication that she is aware of or no other ongoing symptoms currently.    No other complaints today.    Past Medical History:  Past Medical History:   Diagnosis Date    H/O hysterectomy for benign disease with BSO 2017    Morbid obesity     Varicose veins     Vitamin D deficiency      Past Surgical History:   Procedure Laterality Date     SECTION      CHOLECYSTECTOMY      COLONOSCOPY N/A 2020    Procedure: COLONOSCOPY;  Surgeon: Sean Ward MD;  Location: [facility] ([address] [place] [address]);  Service: Endoscopy;  Laterality: N/A;  screening colonooscpy, 49 years old.  siblings with colon polyps   - pt updated on visitor policy and drop off location.  COVID screening ordered for  at [facility] [place] - rb    ESOPHAGOGASTRODUODENOSCOPY N/A 2020    Procedure: ESOPHAGOGASTRODUODENOSCOPY (EGD);  Surgeon: Sean Ward MD;  Location: [facility] ([address] [place] [address]);  Service: Endoscopy;  Laterality: N/A;  BMI 51.77  Re-scheduled per md recommendations-new instructions mailed-tb    GASTRIC BYPASS      ZACK FILTER PLACEMENT      zack filter removal      HYSTERECTOMY      DANNA with BSO;    lap band removal       LAPAROSCOPIC GASTRIC BANDING      OOPHORECTOMY      w/hysteretomy @ age 41    TONSILLECTOMY      VASCULAR SURGERY       Review of patient's allergies indicates:   Allergen Reactions    Brompheniramin-phenylephrin-dm      Other reaction(s): Other (See Comments)  Per patient, caused dizziness + blurry vision     Erythromycin Rash    Erythromycin (bulk) Rash     Social History     Tobacco Use    Smoking status: Former Smoker     Packs/day: 0.00     Years: 0.00     Pack years: 0.00     Start date: 1995     Quit date:      Years since quittin.0    Smokeless tobacco: Never Used   Substance Use Topics    Alcohol use: Yes     Alcohol/week:  1.0 standard drink     Types: 1 Glasses of wine per week     Comment: socially    Drug use: No     Family History   Problem Relation Age of Onset    Aortic aneurysm Mother     Diabetes Mother     Cancer Father         lung cancer/quit tobacco earlier;    Cancer Brother         melanoma;    COPD Brother         emphysema; quit tobacco;    Drug abuse Brother     Aortic aneurysm Brother     Dementia Brother         hx of hypoxia s/p aspiration PNA    Hyperlipidemia Sister     Hypertension Sister     BAYLEE disease Sister     Rheum arthritis Sister     Aortic aneurysm Sister     Drug abuse Brother      Current Outpatient Medications on File Prior to Visit   Medication Sig Dispense Refill    acetaminophen (TYLENOL) 500 MG tablet Take 1 tablet (500 mg total) by mouth every 6 (six) hours as needed for Pain.  0    cyanocobalamin, vitamin B-12, 2,500 mcg Subl Place 1 tablet under the tongue once a week.      docusate sodium (COLACE) 100 MG capsule Take 1 capsule (100 mg total) by mouth 2 (two) times daily.  0    ergocalciferol (ERGOCALCIFEROL) 50,000 unit Cap Take 1 capsule (50,000 Units total) by mouth twice a week. 24 capsule 0    multivitamin capsule Take 3 capsules by mouth 3 (three) times daily.      nystatin (NYSTOP) powder Apply topically 3 (three) times daily. Apply to affected area 1 Bottle 3    omeprazole (PRILOSEC) 40 MG capsule Take 1 capsule (40 mg total) by mouth every morning. 180 capsule 0    ondansetron (ZOFRAN-ODT) 4 MG TbDL Take 2 tablets (8 mg total) by mouth every 8 (eight) hours as needed (nausea). 30 tablet 0    [DISCONTINUED] EScitalopram oxalate (LEXAPRO) 5 MG Tab TAKE 1 TABLET BY MOUTH EVERY DAY 90 tablet 3     No current facility-administered medications on file prior to visit.       Review of Systems   HENT: Negative for hearing loss.    Eyes: Negative for discharge.   Respiratory: Negative for wheezing.    Cardiovascular: Negative for chest pain and palpitations.    Gastrointestinal: Negative for blood in stool, constipation, diarrhea and vomiting.   Genitourinary: Negative for dysuria and hematuria.   Musculoskeletal: Negative for neck pain.   Neurological: Positive for headaches. Negative for weakness.   Endo/Heme/Allergies: Negative for polydipsia.   Psychiatric/Behavioral: Negative for depression. The patient is not nervous/anxious.          Physical Exam   No flowsheet data found.  No flowsheet data found.      No flowsheet data found.    Physical Exam  Constitutional:       General: She is not in acute distress.     Appearance: She is well-developed.   HENT:      Head: Normocephalic and atraumatic.   Pulmonary:      Effort: Pulmonary effort is normal. No respiratory distress.   Musculoskeletal:      Cervical back: Normal range of motion.   Neurological:      Mental Status: She is alert and oriented to person, place, and time.   Psychiatric:         Mood and Affect: Mood normal.         Behavior: Behavior normal.         Thought Content: Thought content normal.         Judgment: Judgment normal.         The patient's Health Maintenance was reviewed and the following appears to be due at this time:  Health Maintenance Due   Topic Date Due    Pneumococcal Vaccines (Age 0-64) (1 - PCV) Never done    Shingles Vaccine (2 of 2) 08/13/2019    COVID-19 Vaccine (3 - Booster for Pfizer series) 03/24/2022

## 2022-07-11 ENCOUNTER — PATIENT MESSAGE (OUTPATIENT)
Dept: FAMILY MEDICINE | Facility: CLINIC | Age: 52
End: 2022-07-11
Payer: COMMERCIAL

## 2022-07-11 DIAGNOSIS — F41.1 GAD (GENERALIZED ANXIETY DISORDER): ICD-10-CM

## 2022-07-11 RX ORDER — ESCITALOPRAM OXALATE 5 MG/1
5 TABLET ORAL DAILY
Qty: 30 TABLET | Refills: 11 | Status: CANCELLED | OUTPATIENT
Start: 2022-07-11 | End: 2023-07-11

## 2022-07-11 NOTE — TELEPHONE ENCOUNTER
No new care gaps identified.  [facility] Embedded Care Gaps. Reference number: [id number]. 7/11/2022   4:23:52 PM CDT

## 2022-07-12 ENCOUNTER — PATIENT MESSAGE (OUTPATIENT)
Dept: FAMILY MEDICINE | Facility: CLINIC | Age: 52
End: 2022-07-12
Payer: COMMERCIAL

## 2022-07-12 RX ORDER — BUPROPION HYDROCHLORIDE 150 MG/1
150 TABLET ORAL DAILY
Qty: 30 TABLET | Refills: 11 | Status: SHIPPED | OUTPATIENT
Start: 2022-07-12 | End: 2022-08-05 | Stop reason: ALTCHOICE

## 2022-07-12 RX ORDER — ESCITALOPRAM OXALATE 5 MG/1
5 TABLET ORAL EVERY OTHER DAY
Qty: 7 TABLET | Refills: 0 | Status: SHIPPED | OUTPATIENT
Start: 2022-07-12 | End: 2022-08-05 | Stop reason: SDUPTHER

## 2022-07-12 RX ORDER — TRIAMCINOLONE ACETONIDE 1 MG/G
CREAM TOPICAL 2 TIMES DAILY
Qty: 30 G | Refills: 0 | Status: SHIPPED | OUTPATIENT
Start: 2022-07-12 | End: 2023-02-13

## 2022-07-12 NOTE — TELEPHONE ENCOUNTER
I have signed for the following orders AND/OR meds.  Please call the patient and ask the patient to schedule the testing AND/OR inform about any medications that were sent. Medications have been sent to pharmacy listed below      No orders of the defined types were placed in this encounter.      Medications Ordered This Encounter   Medications    triamcinolone acetonide 0.1% (KENALOG) 0.1 % cream     Sig: Apply topically 2 (two) times daily.     Dispense:  30 g     Refill:  0         [facility]/pharmacy #[id number]  [place] - [address] [address]  Phone: [phone] Fax: [phone]    [id number] [place] [facility] [address] [facility] [address]  [address] [facility] [address]  [place] [address]  Phone: [phone] Fax: [phone]

## 2022-07-12 NOTE — TELEPHONE ENCOUNTER
I have signed for the following orders AND/OR meds.  Please call the patient and ask the patient to schedule the testing AND/OR inform about any medications that were sent. Medications have been sent to pharmacy listed below      No orders of the defined types were placed in this encounter.      Medications Ordered This Encounter   Medications    buPROPion (WELLBUTRIN XL) 150 MG TB24 tablet     Sig: Take 1 tablet (150 mg total) by mouth once daily.     Dispense:  30 tablet     Refill:  11    EScitalopram oxalate (LEXAPRO) 5 MG Tab     Sig: Take 1 tablet (5 mg total) by mouth every other day. for 14 days     Dispense:  7 tablet     Refill:  0         [facility]/pharmacy #[id number]  [place] - [address] [address]  Phone: [phone] Fax: [phone]    [id number] [place] - [address] [facility] [address]  [address] [facility] [address]  [facility] [address]  Phone: [phone] Fax: [phone]

## 2022-08-03 ENCOUNTER — PATIENT MESSAGE (OUTPATIENT)
Dept: FAMILY MEDICINE | Facility: CLINIC | Age: 52
End: 2022-08-03
Payer: COMMERCIAL

## 2022-08-03 ENCOUNTER — PATIENT MESSAGE (OUTPATIENT)
Dept: BARIATRICS | Facility: CLINIC | Age: 52
End: 2022-08-03
Payer: COMMERCIAL

## 2022-08-03 DIAGNOSIS — F41.1 GAD (GENERALIZED ANXIETY DISORDER): ICD-10-CM

## 2022-08-05 RX ORDER — ESCITALOPRAM OXALATE 5 MG/1
TABLET ORAL
Qty: 42 TABLET | Refills: 0 | Status: SHIPPED | OUTPATIENT
Start: 2022-08-05 | End: 2022-11-01

## 2022-08-05 NOTE — TELEPHONE ENCOUNTER
I have signed for the following orders AND/OR meds.  Please call the patient and ask the patient to schedule the testing AND/OR inform about any medications that were sent. Medications have been sent to pharmacy listed below      No orders of the defined types were placed in this encounter.      Medications Ordered This Encounter   Medications    EScitalopram oxalate (LEXAPRO) 5 MG Tab     Sig: Take 1 tablet (5 mg total) by mouth once daily for 14 days, THEN 2 tablets (10 mg total) once daily for 14 days.     Dispense:  42 tablet     Refill:  0         [facility]/pharmacy #[id number]  [place] - [address] [address]  Phone: [phone] Fax: [phone]    [id number] [place] - [address] [facility] [address]  [address] [facility] [address]  [place] [address]  Phone: [phone] Fax: [phone]

## 2022-08-18 ENCOUNTER — OFFICE VISIT (OUTPATIENT)
Dept: FAMILY MEDICINE | Facility: CLINIC | Age: 52
End: 2022-08-18
Payer: COMMERCIAL

## 2022-08-18 VITALS
OXYGEN SATURATION: 95 % | SYSTOLIC BLOOD PRESSURE: 108 MMHG | HEIGHT: 62 IN | BODY MASS INDEX: 51.6 KG/M2 | TEMPERATURE: 98 F | HEART RATE: 75 BPM | WEIGHT: 280.38 LBS | DIASTOLIC BLOOD PRESSURE: 78 MMHG

## 2022-08-18 DIAGNOSIS — U07.1 COVID: Primary | ICD-10-CM

## 2022-08-18 DIAGNOSIS — L30.9 DERMATITIS: ICD-10-CM

## 2022-08-18 LAB
CTP QC/QA: YES
SARS-COV-2 RDRP RESP QL NAA+PROBE: POSITIVE

## 2022-08-18 PROCEDURE — U0002: ICD-10-PCS | Mod: QW,S$GLB,, | Performed by: NURSE PRACTITIONER

## 2022-08-18 PROCEDURE — 99999 PR PBB SHADOW E&M-EST. PATIENT-LVL IV: ICD-10-PCS | Mod: PBBFAC,,, | Performed by: NURSE PRACTITIONER

## 2022-08-18 PROCEDURE — 99214 PR OFFICE/OUTPT VISIT, EST, LEVL IV, 30-39 MIN: ICD-10-PCS | Mod: S$GLB,,, | Performed by: NURSE PRACTITIONER

## 2022-08-18 PROCEDURE — U0002 COVID-19 LAB TEST NON-CDC: HCPCS | Mod: QW,S$GLB,, | Performed by: NURSE PRACTITIONER

## 2022-08-18 PROCEDURE — 99214 OFFICE O/P EST MOD 30 MIN: CPT | Mod: S$GLB,,, | Performed by: NURSE PRACTITIONER

## 2022-08-18 PROCEDURE — 99999 PR PBB SHADOW E&M-EST. PATIENT-LVL IV: CPT | Mod: PBBFAC,,, | Performed by: NURSE PRACTITIONER

## 2022-08-18 RX ORDER — NYSTATIN AND TRIAMCINOLONE ACETONIDE 100000; 1 [USP'U]/G; MG/G
CREAM TOPICAL 2 TIMES DAILY
Qty: 60 G | Refills: 0 | Status: SHIPPED | OUTPATIENT
Start: 2022-08-18

## 2022-08-18 RX ORDER — PROMETHAZINE HYDROCHLORIDE AND DEXTROMETHORPHAN HYDROBROMIDE 6.25; 15 MG/5ML; MG/5ML
5 SYRUP ORAL 3 TIMES DAILY PRN
Qty: 118 ML | Refills: 0 | Status: SHIPPED | OUTPATIENT
Start: 2022-08-18 | End: 2022-08-28

## 2022-08-18 NOTE — PROGRESS NOTES
Subjective:       Patient ID: Felicia Drew is a 52 y.o. female.    Chief Complaint: Sore Throat and Cough (Started Tuesday, claritin, dayquil. No relief)    Cough  This is a new problem. The current episode started yesterday. The problem has been unchanged. The problem occurs every few minutes. The cough is non-productive. Associated symptoms include nasal congestion, postnasal drip and a rash. Pertinent negatives include no chest pain, fever or myalgias. She has tried nothing for the symptoms.   Rash  This is a chronic problem. The current episode started more than 1 month ago. The problem has been waxing and waning since onset. The affected locations include the right arm. The rash is characterized by redness, itchiness and scaling. She was exposed to nothing. Associated symptoms include congestion and coughing. Pertinent negatives include no eye pain, fatigue or fever. Past treatments include anti-itch cream and topical steroids. The treatment provided no relief.       Review of Systems   Constitutional: Negative for fatigue, fever and unexpected weight change.   HENT: Positive for congestion and postnasal drip.    Eyes: Negative for pain and visual disturbance.   Respiratory: Positive for cough.    Cardiovascular: Negative for chest pain and palpitations.   Musculoskeletal: Negative for arthralgias and myalgias.   Skin: Positive for rash. Negative for color change.   Neurological: Negative for dizziness.   Psychiatric/Behavioral: Negative for dysphoric mood and sleep disturbance. The patient is not nervous/anxious.        Vitals:    08/18/22 0926   BP: 108/78   Pulse: 75   Temp: 97.8 °F (36.6 °C)       Objective:     Current Outpatient Medications   Medication Sig Dispense Refill    acetaminophen (TYLENOL) 500 MG tablet Take 1 tablet (500 mg total) by mouth every 6 (six) hours as needed for Pain.  0    cyanocobalamin, vitamin B-12, 2,500 mcg Subl Place 1 tablet under the tongue once a week.       docusate sodium (COLACE) 100 MG capsule Take 1 capsule (100 mg total) by mouth 2 (two) times daily.  0    ergocalciferol (ERGOCALCIFEROL) 50,000 unit Cap Take 1 capsule (50,000 Units total) by mouth twice a week. 24 capsule 0    EScitalopram oxalate (LEXAPRO) 5 MG Tab Take 1 tablet (5 mg total) by mouth once daily for 14 days, THEN 2 tablets (10 mg total) once daily for 14 days. 42 tablet 0    multivitamin capsule Take 3 capsules by mouth 3 (three) times daily.      omeprazole (PRILOSEC) 40 MG capsule Take 1 capsule (40 mg total) by mouth every morning. 180 capsule 0    ondansetron (ZOFRAN-ODT) 4 MG TbDL Take 2 tablets (8 mg total) by mouth every 8 (eight) hours as needed (nausea). 30 tablet 0    triamcinolone acetonide 0.1% (KENALOG) 0.1 % cream Apply topically 2 (two) times daily. 30 g 0    nystatin (NYSTOP) powder Apply topically 3 (three) times daily. Apply to affected area 1 Bottle 3    nystatin-triamcinolone (MYCOLOG II) cream Apply topically 2 (two) times daily. 60 g 0    promethazine-dextromethorphan (PROMETHAZINE-DM) 6.25-15 mg/5 mL Syrp Take 5 mLs by mouth 3 (three) times daily as needed (cough). 118 mL 0     No current facility-administered medications for this visit.       Physical Exam  Vitals and nursing note reviewed.   Constitutional:       General: She is not in acute distress.     Appearance: She is well-developed.   HENT:      Head: Normocephalic and atraumatic.      Nose: Mucosal edema and rhinorrhea present.      Mouth/Throat:      Pharynx: Pharyngeal swelling present.   Eyes:      Pupils: Pupils are equal, round, and reactive to light.   Cardiovascular:      Rate and Rhythm: Normal rate and regular rhythm.   Pulmonary:      Effort: Pulmonary effort is normal.      Breath sounds: Normal breath sounds.      Comments: + cough  Musculoskeletal:         General: Normal range of motion.      Cervical back: Normal range of motion and neck supple.   Skin:     General: Skin is warm and dry.       Findings: Rash present.   Neurological:      Mental Status: She is alert and oriented to person, place, and time.   Psychiatric:         Judgment: Judgment normal.         Covid is positive     Assessment:       1. COVID    2. Dermatitis        Plan:   COVID  -     POCT COVID-19 Rapid Screening    Dermatitis    Other orders  -     nystatin-triamcinolone (MYCOLOG II) cream; Apply topically 2 (two) times daily.  Dispense: 60 g; Refill: 0  -     promethazine-dextromethorphan (PROMETHAZINE-DM) 6.25-15 mg/5 mL Syrp; Take 5 mLs by mouth 3 (three) times daily as needed (cough).  Dispense: 118 mL; Refill: 0        No follow-ups on file.    There are no Patient Instructions on file for this visit.

## 2022-10-04 ENCOUNTER — PATIENT MESSAGE (OUTPATIENT)
Dept: FAMILY MEDICINE | Facility: CLINIC | Age: 52
End: 2022-10-04
Payer: COMMERCIAL

## 2022-10-04 DIAGNOSIS — F41.8 SITUATIONAL ANXIETY: Primary | ICD-10-CM

## 2022-10-04 RX ORDER — ALPRAZOLAM 0.25 MG/1
0.25 TABLET ORAL DAILY PRN
Qty: 10 TABLET | Refills: 0 | Status: SHIPPED | OUTPATIENT
Start: 2022-10-04 | End: 2022-12-27

## 2022-10-04 NOTE — TELEPHONE ENCOUNTER
I have signed for the following orders AND/OR meds.  Please call the patient and ask the patient to schedule the testing AND/OR inform about any medications that were sent. Medications have been sent to pharmacy listed below      No orders of the defined types were placed in this encounter.      Medications Ordered This Encounter   Medications    ALPRAZolam (XANAX) 0.25 MG tablet     Sig: Take 1 tablet (0.25 mg total) by mouth daily as needed for Anxiety.     Dispense:  10 tablet     Refill:  0         [facility]/pharmacy #[id number]  [place] - [address] [address]  Phone: [phone] Fax: [phone]    [id number] [place] - [address] [facility] [address] [facility] [address]  [place] [address]  Phone: [phone] Fax: [phone]

## 2022-10-06 ENCOUNTER — PATIENT MESSAGE (OUTPATIENT)
Dept: BARIATRICS | Facility: CLINIC | Age: 52
End: 2022-10-06
Payer: COMMERCIAL

## 2022-11-02 DIAGNOSIS — Z12.31 OTHER SCREENING MAMMOGRAM: ICD-10-CM

## 2022-11-08 ENCOUNTER — PATIENT MESSAGE (OUTPATIENT)
Dept: FAMILY MEDICINE | Facility: CLINIC | Age: 52
End: 2022-11-08
Payer: COMMERCIAL

## 2022-11-08 DIAGNOSIS — Z82.49 FAMILY HISTORY OF ABDOMINAL AORTIC ANEURYSM (AAA): Primary | ICD-10-CM

## 2022-11-16 ENCOUNTER — TELEPHONE (OUTPATIENT)
Dept: FAMILY MEDICINE | Facility: CLINIC | Age: 52
End: 2022-11-16
Payer: COMMERCIAL

## 2022-11-17 ENCOUNTER — HOSPITAL ENCOUNTER (OUTPATIENT)
Dept: RADIOLOGY | Facility: HOSPITAL | Age: 52
Discharge: HOME | End: 2022-11-17
Attending: INTERNAL MEDICINE
Payer: COMMERCIAL

## 2022-11-17 VITALS — HEIGHT: 62 IN | WEIGHT: 280 LBS | BODY MASS INDEX: 51.53 KG/M2

## 2022-11-17 DIAGNOSIS — Z12.31 OTHER SCREENING MAMMOGRAM: ICD-10-CM

## 2022-11-17 PROCEDURE — 77063 BREAST TOMOSYNTHESIS BI: CPT | Mod: TC,PO

## 2022-11-17 PROCEDURE — 77067 MAMMO DIGITAL SCREENING BILAT WITH TOMO: ICD-10-PCS | Mod: 26,,, | Performed by: RADIOLOGY

## 2022-11-17 PROCEDURE — 77067 SCR MAMMO BI INCL CAD: CPT | Mod: TC,PO

## 2022-11-17 PROCEDURE — 77067 SCR MAMMO BI INCL CAD: CPT | Mod: 26,,, | Performed by: RADIOLOGY

## 2022-11-17 PROCEDURE — 77063 MAMMO DIGITAL SCREENING BILAT WITH TOMO: ICD-10-PCS | Mod: 26,,, | Performed by: RADIOLOGY

## 2022-11-17 PROCEDURE — 77063 BREAST TOMOSYNTHESIS BI: CPT | Mod: 26,,, | Performed by: RADIOLOGY

## 2022-11-17 NOTE — TELEPHONE ENCOUNTER
I have signed for the following orders AND/OR meds.  Please call the patient and ask the patient to schedule the testing AND/OR inform about any medications that were sent. Medications have been sent to pharmacy listed below      Orders Placed This Encounter   Procedures    US Abdominal Aorta     Standing Status:   Future     Standing Expiration Date:   11/17/2023     Order Specific Question:   May the Radiologist modify the order per protocol to meet the clinical needs of the patient?     Answer:   Yes     Order Specific Question:   Release to patient     Answer:   Immediate              [id number] [place] [address] [place] [address]  Phone: [phone] Fax: [phone]    [id number] [place] - [address] [facility] [address]  [address] [facility] [address]  [place] [address]  Phone: [phone] Fax: [phone]

## 2022-11-18 ENCOUNTER — TELEPHONE (OUTPATIENT)
Dept: FAMILY MEDICINE | Facility: CLINIC | Age: 52
End: 2022-11-18
Payer: COMMERCIAL

## 2022-11-18 NOTE — PROGRESS NOTES
Normal mammogram, repeat in 1 year, results released through [facility]. Please verify that patient has viewed results. If not, please call patient with interpretation below:    I have reviewed the results of your mammogram and it appears that everything was read as normal.  Based on this, the radiologist has recommended that you recheck a mammogram in 1 year.     Also please see below health maintenance items that are due:    Shingles Vaccine(2 of 2) due on 08/13/2019  COVID-19 Vaccine(3 - Booster for Pfizer series) due on 12/19/2021  Influenza Vaccine(1) due on 09/01/2022

## 2022-12-01 ENCOUNTER — HOSPITAL ENCOUNTER (OUTPATIENT)
Dept: RADIOLOGY | Facility: HOSPITAL | Age: 52
Discharge: HOME | End: 2022-12-01
Attending: INTERNAL MEDICINE
Payer: COMMERCIAL

## 2022-12-01 DIAGNOSIS — Z82.49 FAMILY HISTORY OF ABDOMINAL AORTIC ANEURYSM (AAA): ICD-10-CM

## 2022-12-01 PROCEDURE — 76775 US ABDOMINAL AORTA: ICD-10-PCS | Mod: 26,,, | Performed by: RADIOLOGY

## 2022-12-01 PROCEDURE — 76775 US EXAM ABDO BACK WALL LIM: CPT | Mod: 26,,, | Performed by: RADIOLOGY

## 2022-12-01 PROCEDURE — 76775 US EXAM ABDO BACK WALL LIM: CPT | Mod: TC,PO

## 2022-12-05 NOTE — PROGRESS NOTES
The following results have been released to patient's [facility]. Please verify that they have reviewed results. If not, please call patient with below:    I have reviewed your recent abdominal aortic ultrasound.    Ultrasound showed no evidence of an an aneurysm.     Please do not hesitate to call or message with any additional questions or concerns.    Terrie Hartmann MD

## 2022-12-27 ENCOUNTER — OFFICE VISIT (OUTPATIENT)
Dept: FAMILY MEDICINE | Facility: CLINIC | Age: 52
End: 2022-12-27
Payer: COMMERCIAL

## 2022-12-27 ENCOUNTER — HOSPITAL ENCOUNTER (OUTPATIENT)
Dept: RADIOLOGY | Facility: HOSPITAL | Age: 52
Discharge: HOME | End: 2022-12-27
Attending: NURSE PRACTITIONER
Payer: COMMERCIAL

## 2022-12-27 VITALS
HEART RATE: 61 BPM | HEIGHT: 62 IN | OXYGEN SATURATION: 97 % | WEIGHT: 288.94 LBS | BODY MASS INDEX: 53.17 KG/M2 | DIASTOLIC BLOOD PRESSURE: 82 MMHG | TEMPERATURE: 98 F | RESPIRATION RATE: 18 BRPM | SYSTOLIC BLOOD PRESSURE: 121 MMHG

## 2022-12-27 DIAGNOSIS — J06.9 UPPER RESPIRATORY TRACT INFECTION, UNSPECIFIED TYPE: ICD-10-CM

## 2022-12-27 DIAGNOSIS — R05.9 COUGH, UNSPECIFIED TYPE: ICD-10-CM

## 2022-12-27 DIAGNOSIS — S89.91XA INJURY OF RIGHT KNEE, INITIAL ENCOUNTER: ICD-10-CM

## 2022-12-27 LAB
CTP QC/QA: YES
CTP QC/QA: YES
POC MOLECULAR INFLUENZA A AGN: NEGATIVE
POC MOLECULAR INFLUENZA B AGN: NEGATIVE
SARS-COV-2 RDRP RESP QL NAA+PROBE: NEGATIVE

## 2022-12-27 PROCEDURE — 87635 SARS-COV-2 COVID-19 AMP PRB: CPT | Mod: QW,S$GLB,, | Performed by: NURSE PRACTITIONER

## 2022-12-27 PROCEDURE — 99999 PR PBB SHADOW E&M-EST. PATIENT-LVL V: CPT | Mod: PBBFAC,,, | Performed by: NURSE PRACTITIONER

## 2022-12-27 PROCEDURE — 99999 PR PBB SHADOW E&M-EST. PATIENT-LVL V: ICD-10-PCS | Mod: PBBFAC,,, | Performed by: NURSE PRACTITIONER

## 2022-12-27 PROCEDURE — 73560 X-RAY EXAM OF KNEE 1 OR 2: CPT | Mod: 59,TC,PO,LT

## 2022-12-27 PROCEDURE — 73562 XR KNEE ORTHO RIGHT: ICD-10-PCS | Mod: 26,RT,, | Performed by: RADIOLOGY

## 2022-12-27 PROCEDURE — 73560 X-RAY EXAM OF KNEE 1 OR 2: CPT | Mod: 26,LT,, | Performed by: RADIOLOGY

## 2022-12-27 PROCEDURE — 73560 XR KNEE ORTHO RIGHT: ICD-10-PCS | Mod: 26,LT,, | Performed by: RADIOLOGY

## 2022-12-27 PROCEDURE — 99214 PR OFFICE/OUTPT VISIT, EST, LEVL IV, 30-39 MIN: ICD-10-PCS | Mod: S$GLB,,, | Performed by: NURSE PRACTITIONER

## 2022-12-27 PROCEDURE — 73562 X-RAY EXAM OF KNEE 3: CPT | Mod: 26,RT,, | Performed by: RADIOLOGY

## 2022-12-27 PROCEDURE — 87502 INFLUENZA DNA AMP PROBE: CPT | Mod: QW,S$GLB,, | Performed by: NURSE PRACTITIONER

## 2022-12-27 PROCEDURE — 99214 OFFICE O/P EST MOD 30 MIN: CPT | Mod: S$GLB,,, | Performed by: NURSE PRACTITIONER

## 2022-12-27 PROCEDURE — 87502 POCT INFLUENZA A/B MOLECULAR: ICD-10-PCS | Mod: QW,S$GLB,, | Performed by: NURSE PRACTITIONER

## 2022-12-27 PROCEDURE — 87635: ICD-10-PCS | Mod: QW,S$GLB,, | Performed by: NURSE PRACTITIONER

## 2022-12-27 RX ORDER — METHYLPREDNISOLONE 4 MG/1
TABLET ORAL
Qty: 1 EACH | Refills: 0 | Status: SHIPPED | OUTPATIENT
Start: 2022-12-27 | End: 2023-02-13

## 2022-12-27 RX ORDER — HYDROCODONE POLISTIREX AND CHLORPHENIRAMINE POLISTIREX 10; 8 MG/5ML; MG/5ML
5 SUSPENSION, EXTENDED RELEASE ORAL EVERY 12 HOURS PRN
Qty: 115 ML | Refills: 0 | Status: SHIPPED | OUTPATIENT
Start: 2022-12-27 | End: 2023-02-13

## 2022-12-27 NOTE — PROGRESS NOTES
Subjective:       Patient ID: Felicia Drew is a 52 y.o. female.    Chief Complaint: Cough    Cough  This is a new problem. The current episode started in the past 7 days. The problem has been gradually worsening. The problem occurs every few minutes. The cough is Productive of brown sputum. Associated symptoms include headaches, postnasal drip, a sore throat and wheezing. Pertinent negatives include no chest pain, chills, ear congestion, ear pain, fever, heartburn, hemoptysis, myalgias, nasal congestion, rash, rhinorrhea, shortness of breath, sweats or weight loss. Nothing aggravates the symptoms. She has tried OTC cough suppressant, prescription cough suppressant and rest for the symptoms. The treatment provided mild relief. Her past medical history is significant for bronchitis and pneumonia. There is no history of asthma, bronchiectasis, COPD, emphysema or environmental allergies.   Knee Pain   The incident occurred more than 1 week ago. There was no injury mechanism. The quality of the pain is described as aching. The pain is moderate. Associated symptoms comments: Felt a pop in the joint. The symptoms are aggravated by movement and weight bearing. She has tried nothing for the symptoms.     Review of Systems   Constitutional:  Negative for chills, fatigue, fever, unexpected weight change and weight loss.   HENT:  Positive for postnasal drip and sore throat. Negative for ear pain and rhinorrhea.    Eyes:  Negative for pain and visual disturbance.   Respiratory:  Positive for cough and wheezing. Negative for hemoptysis and shortness of breath.    Cardiovascular:  Negative for chest pain and palpitations.   Gastrointestinal:  Negative for abdominal pain, diarrhea, heartburn and vomiting.   Musculoskeletal:  Negative for arthralgias and myalgias.   Skin:  Negative for color change and rash.   Allergic/Immunologic: Negative for environmental allergies.   Neurological:  Positive for headaches. Negative for  dizziness.   Psychiatric/Behavioral:  Negative for dysphoric mood and sleep disturbance. The patient is not nervous/anxious.      Vitals:    12/27/22 1307   BP: 121/82   Pulse: 61   Resp: 18   Temp: 97.7 °F (36.5 °C)       Objective:     Current Outpatient Medications   Medication Sig Dispense Refill    acetaminophen (TYLENOL) 500 MG tablet Take 1 tablet (500 mg total) by mouth every 6 (six) hours as needed for Pain.  0    cyanocobalamin, vitamin B-12, 2,500 mcg Subl Place 1 tablet under the tongue once a week.      docusate sodium (COLACE) 100 MG capsule Take 1 capsule (100 mg total) by mouth 2 (two) times daily.  0    ergocalciferol (ERGOCALCIFEROL) 50,000 unit Cap Take 1 capsule (50,000 Units total) by mouth twice a week. 24 capsule 0    EScitalopram oxalate (LEXAPRO) 10 MG tablet Take 1 tablet (10 mg total) by mouth once daily. 90 tablet 3    multivitamin capsule Take 3 capsules by mouth 3 (three) times daily.      nystatin (NYSTOP) powder Apply topically 3 (three) times daily. Apply to affected area 1 Bottle 3    omeprazole (PRILOSEC) 40 MG capsule Take 1 capsule (40 mg total) by mouth every morning. 180 capsule 0    ondansetron (ZOFRAN-ODT) 4 MG TbDL Take 2 tablets (8 mg total) by mouth every 8 (eight) hours as needed (nausea). 30 tablet 0    triamcinolone acetonide 0.1% (KENALOG) 0.1 % cream Apply topically 2 (two) times daily. 30 g 0    hydrocodone-chlorpheniramine (TUSSIONEX) 10-8 mg/5 mL suspension Take 5 mLs by mouth every 12 (twelve) hours as needed for Cough. 115 mL 0    methylPREDNISolone (MEDROL DOSEPACK) 4 mg tablet use as directed 1 each 0    nystatin-triamcinolone (MYCOLOG II) cream Apply topically 2 (two) times daily. (Patient not taking: Reported on 12/27/2022) 60 g 0     No current facility-administered medications for this visit.       Physical Exam  Vitals and nursing note reviewed.   Constitutional:       General: She is not in acute distress.     Appearance: She is well-developed.   HENT:       Head: Normocephalic and atraumatic.      Nose: Mucosal edema present.      Mouth/Throat:      Pharynx: Pharyngeal swelling (post nasal mucus) present.   Eyes:      Pupils: Pupils are equal, round, and reactive to light.   Cardiovascular:      Rate and Rhythm: Normal rate and regular rhythm.   Pulmonary:      Effort: Pulmonary effort is normal.      Breath sounds: Normal breath sounds.      Comments: + cough  Musculoskeletal:      Cervical back: Normal range of motion and neck supple.      Right knee: Decreased range of motion. Tenderness present over the medial joint line.      Left knee: Normal.   Skin:     General: Skin is warm and dry.      Findings: No rash.   Neurological:      Mental Status: She is alert and oriented to person, place, and time.   Psychiatric:         Judgment: Judgment normal.       Flu is negative  Covid is negative    Assessment:       1. Upper respiratory tract infection, unspecified type    2. Injury of right knee, initial encounter    3. Cough, unspecified type          Plan:   Upper respiratory tract infection, unspecified type    Injury of right knee, initial encounter  -     Cancel: X-Ray Knee 1 or 2 View Right; Future; Expected date: 12/27/2022    Cough, unspecified type  -     POCT COVID-19 Rapid Screening  -     POCT Influenza A/B Molecular    Other orders  -     methylPREDNISolone (MEDROL DOSEPACK) 4 mg tablet; use as directed  Dispense: 1 each; Refill: 0  -     hydrocodone-chlorpheniramine (TUSSIONEX) 10-8 mg/5 mL suspension; Take 5 mLs by mouth every 12 (twelve) hours as needed for Cough.  Dispense: 115 mL; Refill: 0        No follow-ups on file.    There are no Patient Instructions on file for this visit.

## 2023-01-10 ENCOUNTER — PATIENT MESSAGE (OUTPATIENT)
Dept: FAMILY MEDICINE | Facility: CLINIC | Age: 53
End: 2023-01-10
Payer: COMMERCIAL

## 2023-01-10 DIAGNOSIS — S89.91XA INJURY OF RIGHT KNEE, INITIAL ENCOUNTER: Primary | ICD-10-CM

## 2023-01-13 ENCOUNTER — OFFICE VISIT (OUTPATIENT)
Dept: ORTHOPEDICS | Facility: CLINIC | Age: 53
End: 2023-01-13
Payer: COMMERCIAL

## 2023-01-13 VITALS — HEIGHT: 62 IN | WEIGHT: 288 LBS | BODY MASS INDEX: 53 KG/M2

## 2023-01-13 DIAGNOSIS — S89.91XA INJURY OF RIGHT KNEE, INITIAL ENCOUNTER: ICD-10-CM

## 2023-01-13 DIAGNOSIS — M75.41 IMPINGEMENT SYNDROME OF RIGHT SHOULDER: ICD-10-CM

## 2023-01-13 DIAGNOSIS — M17.11 PRIMARY OSTEOARTHRITIS OF RIGHT KNEE: Primary | ICD-10-CM

## 2023-01-13 PROCEDURE — 99214 PR OFFICE/OUTPT VISIT, EST, LEVL IV, 30-39 MIN: ICD-10-PCS | Mod: 25,S$GLB,, | Performed by: NURSE PRACTITIONER

## 2023-01-13 PROCEDURE — 20610 DRAIN/INJ JOINT/BURSA W/O US: CPT | Mod: RT,S$GLB,, | Performed by: NURSE PRACTITIONER

## 2023-01-13 PROCEDURE — 99214 OFFICE O/P EST MOD 30 MIN: CPT | Mod: 25,S$GLB,, | Performed by: NURSE PRACTITIONER

## 2023-01-13 PROCEDURE — 20610 DRAIN/INJ JOINT/BURSA W/O US: CPT | Mod: 59,RT,S$GLB, | Performed by: NURSE PRACTITIONER

## 2023-01-13 PROCEDURE — 99999 PR PBB SHADOW E&M-EST. PATIENT-LVL IV: CPT | Mod: PBBFAC,,, | Performed by: NURSE PRACTITIONER

## 2023-01-13 PROCEDURE — 99999 PR PBB SHADOW E&M-EST. PATIENT-LVL IV: ICD-10-PCS | Mod: PBBFAC,,, | Performed by: NURSE PRACTITIONER

## 2023-01-13 PROCEDURE — 20610 LARGE JOINT ASPIRATION/INJECTION: R SUBACROMIAL BURSA: ICD-10-PCS | Mod: RT,S$GLB,, | Performed by: NURSE PRACTITIONER

## 2023-01-13 RX ADMIN — TRIAMCINOLONE ACETONIDE 40 MG: 40 INJECTION, SUSPENSION INTRA-ARTICULAR; INTRAMUSCULAR at 08:01

## 2023-01-13 NOTE — PROCEDURES
Large Joint Aspiration/Injection: R subacromial bursa    Date/Time: 1/13/2023 8:40 AM  Performed by: JESSICA Simms  Authorized by: JESSICA Simms     Consent Done?:  Yes (Verbal)  Indications:  Pain  Timeout: prior to procedure the correct patient, procedure, and site was verified    Prep: patient was prepped and draped in usual sterile fashion      Local anesthesia used?: Yes    Local anesthetic:  Lidocaine 1% without epinephrine  Anesthetic total (ml):  5      Details:  Needle Size:  22 G  Ultrasonic Guidance for needle placement?: No    Approach:  Posterior  Location:  Shoulder  Site:  R subacromial bursa  Medications:  40 mg triamcinolone acetonide 40 mg/mL  Patient tolerance:  Patient tolerated the procedure well with no immediate complications  Large Joint Aspiration/Injection: R knee    Date/Time: 1/13/2023 8:40 AM  Performed by: JESSICA Simms  Authorized by: JESSICA Simms     Consent Done?:  Yes (Verbal)  Indications:  Pain  Timeout: prior to procedure the correct patient, procedure, and site was verified    Prep: patient was prepped and draped in usual sterile fashion    Local anesthetic:  Lidocaine 1% without epinephrine  Anesthetic total (ml):  5      Details:  Needle Size:  21 G  Approach:  Anterolateral  Location:  Knee  Site:  R knee  Medications:  40 mg triamcinolone acetonide 40 mg/mL  Patient tolerance:  Patient tolerated the procedure well with no immediate complications

## 2023-01-13 NOTE — PROGRESS NOTES
Chief Complaint   Patient presents with    Right Knee - Pain, Swelling    Right Shoulder - Pain       HPI:    This is a 52 y.o. female who presents today complaining of right knee pain for a little over a month after no known injury or trauma. Pain is achign. No numbness or tingling. She also complains of right shoulder pain and has had this injected in the past and that seemed to hlep her a lot.      Past Medical History:   Diagnosis Date    H/O hysterectomy for benign disease with BSO 2017    Morbid obesity     Varicose veins     Vitamin D deficiency       Past Surgical History:   Procedure Laterality Date     SECTION      CHOLECYSTECTOMY      COLONOSCOPY N/A 2020    Procedure: COLONOSCOPY;  Surgeon: Sean Ward MD;  Location: [facility] ([address] [place] [address]);  Service: Endoscopy;  Laterality: N/A;  screening colonooscpy, 49 years old.  siblings with colon polyps   - pt updated on visitor policy and drop off location.  COVID screening ordered for  at [facility] [place] - rb    ESOPHAGOGASTRODUODENOSCOPY N/A 2020    Procedure: ESOPHAGOGASTRODUODENOSCOPY (EGD);  Surgeon: Sean Ward MD;  Location: [facility] ([address] [place] [address]);  Service: Endoscopy;  Laterality: N/A;  BMI 51.77  Re-scheduled per md recommendations-new instructions mailed-tb    GASTRIC BYPASS      ZACK FILTER PLACEMENT      zack filter removal      HYSTERECTOMY      DANNA with BSO;    lap band removal       LAPAROSCOPIC GASTRIC BANDING      OOPHORECTOMY      w/hysteretomy @ age 41    TONSILLECTOMY      VASCULAR SURGERY        Current Outpatient Medications on File Prior to Visit   Medication Sig Dispense Refill    acetaminophen (TYLENOL) 500 MG tablet Take 1 tablet (500 mg total) by mouth every 6 (six) hours as needed for Pain.  0    cyanocobalamin, vitamin B-12, 2,500 mcg Subl Place 1 tablet under the tongue once a week.      docusate sodium (COLACE) 100 MG capsule Take 1 capsule (100 mg total) by mouth 2 (two) times  daily.  0    ergocalciferol (ERGOCALCIFEROL) 50,000 unit Cap Take 1 capsule (50,000 Units total) by mouth twice a week. 24 capsule 0    EScitalopram oxalate (LEXAPRO) 10 MG tablet Take 1 tablet (10 mg total) by mouth once daily. 90 tablet 3    hydrocodone-chlorpheniramine (TUSSIONEX) 10-8 mg/5 mL suspension Take 5 mLs by mouth every 12 (twelve) hours as needed for Cough. 115 mL 0    methylPREDNISolone (MEDROL DOSEPACK) 4 mg tablet use as directed 1 each 0    multivitamin capsule Take 3 capsules by mouth 3 (three) times daily.      nystatin-triamcinolone (MYCOLOG II) cream Apply topically 2 (two) times daily. 60 g 0    omeprazole (PRILOSEC) 40 MG capsule Take 1 capsule (40 mg total) by mouth every morning. 180 capsule 0    ondansetron (ZOFRAN-ODT) 4 MG TbDL Take 2 tablets (8 mg total) by mouth every 8 (eight) hours as needed (nausea). 30 tablet 0    triamcinolone acetonide 0.1% (KENALOG) 0.1 % cream Apply topically 2 (two) times daily. 30 g 0    nystatin (NYSTOP) powder Apply topically 3 (three) times daily. Apply to affected area 1 Bottle 3     No current facility-administered medications on file prior to visit.      Review of patient's allergies indicates:   Allergen Reactions    Brompheniramin-phenylephrin-dm      Other reaction(s): Other (See Comments)  Per patient, caused dizziness + blurry vision     Erythromycin Rash    Erythromycin (bulk) Rash      Family History not pertinent   Social History     Socioeconomic History    Marital status:     Number of children: 2   Tobacco Use    Smoking status: Former     Packs/day: 0.00     Years: 0.00     Pack years: 0.00     Types: Cigarettes     Start date: 1995     Quit date:      Years since quittin.5    Smokeless tobacco: Never   Substance and Sexual Activity    Alcohol use: Yes     Alcohol/week: 1.0 standard drink     Types: 1 Glasses of wine per week     Comment: socially    Drug use: No    Sexual activity: Yes     Partners: Male     Birth  control/protection: Other-see comments     Comment: Hysterectomy         Review of Systems:   Constitutional:  Denies fever or chills    Eyes:  Denies change in visual acuity    HENT:  Denies nasal congestion or sore throat    Respiratory:  Denies cough or shortness of breath    Cardiovascular:  Denies chest pain or edema    GI:  Denies abdominal pain, nausea, vomiting, bloody stools or diarrhea    :  Denies dysuria    Integument:  Denies rash    Neurologic:  Denies headache, focal weakness or sensory changes    Endocrine:  Denies polyuria or polydipsia    Lymphatic:  Denies swollen glands    Psychiatric:  Denies depression or anxiety       Physical Exam:    Constitutional:  Well developed, well nourished, no acute distress, non-toxic appearance    Integument:  Well hydrated, no rash    Lymphatic:  No lymphadenopathy noted    Neurologic:  Alert & oriented x 3,     Psychiatric:  Speech and behavior appropriate      Bilateral Knee Exam    Right Knee Exam   Tenderness   The patient is experiencing tenderness in the medial joint line.    Range of Motion   Flexion: abnormal     Muscle Strength   The patient has normal bilateral knee strength.    Tests   Demond:  Medial - positive   Lachman:  Anterior - negative      Varus: negative  Valgus: negative  Patellar Apprehension: negative      Other   Erythema: absent  Sensation: normal  Pulse: present  Swelling: mild    Left Knee exam   Knee exam performed same as contralateral side and is normal        X-rays were performed today, personally reviewed by me and findings discussed with the patient.  3 views of the right knee show tricompartmental degenerative changes            Bilateral Shoulder Exam    Left Shoulder Exam   Shoulder exam performed same as contralateral side and is normal.    Right Shoulder Exam   Tenderness   Shoulder tenderness location: diffusely about shoulder.    Range of Motion   Forward Flexion: abnormal   External Rotation: abnormal     Muscle  Strength   Supraspinatus: 4/5     Tests   Hawkin's test: positive  Impingement: positive    Other   Erythema: absent  Sensation: normal  Pulse: present                  Primary osteoarthritis of right knee  -     Large Joint Aspiration/Injection: R knee    Injury of right knee, initial encounter  -     Ambulatory referral/consult to Orthopedics    Impingement syndrome of right shoulder  -     Large Joint Aspiration/Injection: R subacromial bursa          Using an aseptic technique, I injected 5 cc of lidocaine 1% without and 1 cc of kenalog 40mg into the right shoulder and right knee. The patient tolerated this well.     RTC as needed.           Answers submitted by the patient for this visit:  New Patient on 1/13/2023  8:40 AM with Susan Kolb  Orthopedics Questionnaire (Submitted on 1/13/2023)  unexpected weight change: Yes  appetite change : Yes  sleep disturbance: Yes  IMMUNOCOMPROMISED: No  nervous/ anxious: Yes  dysphoric mood: Yes  rash: No  visual disturbance: No  eye redness: Yes  eye pain: No  ear pain: No  tinnitus: No  hearing loss: No  sinus pressure : No  nosebleeds: No  enviro allergies: No  food allergies: No  cough: No  shortness of breath: No  sweating: No  frequency: No  difficulty urinating: No  hematuria: No  painful intercourse: No  chest pain: No  palpitations: No  nausea: No  vomiting: No  diarrhea: No  blood in stool: No  constipation: No  headaches: Yes  dizziness: No  numbness: No  seizures: No  joint swelling: No  myalgia: Yes  weakness: No  back pain: No

## 2023-01-16 RX ORDER — TRIAMCINOLONE ACETONIDE 40 MG/ML
40 INJECTION, SUSPENSION INTRA-ARTICULAR; INTRAMUSCULAR
Status: DISCONTINUED | OUTPATIENT
Start: 2023-01-13 | End: 2023-01-16 | Stop reason: HOSPADM

## 2023-02-13 ENCOUNTER — LAB VISIT (OUTPATIENT)
Dept: LAB | Facility: HOSPITAL | Age: 53
End: 2023-02-13
Attending: NURSE PRACTITIONER
Payer: COMMERCIAL

## 2023-02-13 ENCOUNTER — OFFICE VISIT (OUTPATIENT)
Dept: FAMILY MEDICINE | Facility: CLINIC | Age: 53
End: 2023-02-13
Payer: COMMERCIAL

## 2023-02-13 VITALS
OXYGEN SATURATION: 96 % | BODY MASS INDEX: 53.09 KG/M2 | HEIGHT: 62 IN | DIASTOLIC BLOOD PRESSURE: 86 MMHG | TEMPERATURE: 98 F | SYSTOLIC BLOOD PRESSURE: 130 MMHG | HEART RATE: 71 BPM | WEIGHT: 288.5 LBS

## 2023-02-13 DIAGNOSIS — M62.838 MUSCLE SPASMS OF BOTH LOWER EXTREMITIES: Primary | ICD-10-CM

## 2023-02-13 DIAGNOSIS — M62.838 MUSCLE SPASMS OF BOTH LOWER EXTREMITIES: ICD-10-CM

## 2023-02-13 LAB
ANION GAP SERPL CALC-SCNC: 10 MMOL/L (ref 8–16)
BUN SERPL-MCNC: 11 MG/DL (ref 6–20)
CALCIUM SERPL-MCNC: 8.7 MG/DL (ref 8.7–10.5)
CHLORIDE SERPL-SCNC: 106 MMOL/L (ref 95–110)
CO2 SERPL-SCNC: 25 MMOL/L (ref 23–29)
CREAT SERPL-MCNC: 0.6 MG/DL (ref 0.5–1.4)
EST. GFR  (NO RACE VARIABLE): >60 ML/MIN/1.73 M^2
GLUCOSE SERPL-MCNC: 80 MG/DL (ref 70–110)
MAGNESIUM SERPL-MCNC: 1.7 MG/DL (ref 1.6–2.6)
POTASSIUM SERPL-SCNC: 3.5 MMOL/L (ref 3.5–5.1)
SODIUM SERPL-SCNC: 141 MMOL/L (ref 136–145)

## 2023-02-13 PROCEDURE — 99213 OFFICE O/P EST LOW 20 MIN: CPT | Mod: S$GLB,,, | Performed by: NURSE PRACTITIONER

## 2023-02-13 PROCEDURE — 99213 PR OFFICE/OUTPT VISIT, EST, LEVL III, 20-29 MIN: ICD-10-PCS | Mod: S$GLB,,, | Performed by: NURSE PRACTITIONER

## 2023-02-13 PROCEDURE — 80048 BASIC METABOLIC PNL TOTAL CA: CPT | Performed by: NURSE PRACTITIONER

## 2023-02-13 PROCEDURE — 99999 PR PBB SHADOW E&M-EST. PATIENT-LVL IV: ICD-10-PCS | Mod: PBBFAC,,, | Performed by: NURSE PRACTITIONER

## 2023-02-13 PROCEDURE — 36415 COLL VENOUS BLD VENIPUNCTURE: CPT | Mod: PO | Performed by: NURSE PRACTITIONER

## 2023-02-13 PROCEDURE — 83735 ASSAY OF MAGNESIUM: CPT | Performed by: NURSE PRACTITIONER

## 2023-02-13 PROCEDURE — 99999 PR PBB SHADOW E&M-EST. PATIENT-LVL IV: CPT | Mod: PBBFAC,,, | Performed by: NURSE PRACTITIONER

## 2023-02-13 RX ORDER — METHOCARBAMOL 500 MG/1
500 TABLET, FILM COATED ORAL 4 TIMES DAILY
Qty: 40 TABLET | Refills: 0 | Status: SHIPPED | OUTPATIENT
Start: 2023-02-13 | End: 2023-02-23

## 2023-02-14 NOTE — PROGRESS NOTES
Subjective:       Patient ID: Felicia Drew is a 52 y.o. female.    Chief Complaint: Leg Pain (Calf pain. When walking. ) and Diarrhea    Leg Pain   The incident occurred 3 to 5 days ago. Injury mechanism: started after a couple of days of extended walking. The quality of the pain is described as cramping. The pain is severe. The pain has been Intermittent since onset. The symptoms are aggravated by palpation and weight bearing. She has tried acetaminophen for the symptoms. The treatment provided no relief.     Review of Systems   Constitutional:  Negative for fatigue, fever and unexpected weight change.   HENT:  Negative for ear pain and sore throat.    Eyes:  Negative for pain and visual disturbance.   Respiratory:  Negative for cough and shortness of breath.    Cardiovascular:  Negative for chest pain and palpitations.   Gastrointestinal:  Negative for abdominal pain, diarrhea and vomiting.   Musculoskeletal:  Positive for myalgias. Negative for arthralgias.   Skin:  Negative for color change and rash.   Neurological:  Negative for dizziness and headaches.   Psychiatric/Behavioral:  Negative for dysphoric mood and sleep disturbance. The patient is not nervous/anxious.      Vitals:    02/13/23 0821   BP: 130/86   Pulse: 71   Temp: 97.6 °F (36.4 °C)       Objective:     Current Outpatient Medications   Medication Sig Dispense Refill    acetaminophen (TYLENOL) 500 MG tablet Take 1 tablet (500 mg total) by mouth every 6 (six) hours as needed for Pain.  0    cyanocobalamin, vitamin B-12, 2,500 mcg Subl Place 1 tablet under the tongue once a week.      EScitalopram oxalate (LEXAPRO) 10 MG tablet Take 1 tablet (10 mg total) by mouth once daily. 90 tablet 3    multivitamin capsule Take 3 capsules by mouth 3 (three) times daily.      nystatin-triamcinolone (MYCOLOG II) cream Apply topically 2 (two) times daily. 60 g 0    omeprazole (PRILOSEC) 40 MG capsule Take 1 capsule (40 mg total) by mouth every morning.  180 capsule 0    ondansetron (ZOFRAN-ODT) 4 MG TbDL Take 2 tablets (8 mg total) by mouth every 8 (eight) hours as needed (nausea). 30 tablet 0    docusate sodium (COLACE) 100 MG capsule Take 1 capsule (100 mg total) by mouth 2 (two) times daily. (Patient not taking: Reported on 2/13/2023)  0    ergocalciferol (ERGOCALCIFEROL) 50,000 unit Cap Take 1 capsule (50,000 Units total) by mouth twice a week. (Patient not taking: Reported on 2/13/2023) 24 capsule 0    methocarbamoL (ROBAXIN) 500 MG Tab Take 1 tablet (500 mg total) by mouth 4 (four) times daily. for 10 days 40 tablet 0    nystatin (NYSTOP) powder Apply topically 3 (three) times daily. Apply to affected area 1 Bottle 3     No current facility-administered medications for this visit.       Physical Exam  Vitals and nursing note reviewed.   Constitutional:       General: She is not in acute distress.     Appearance: She is well-developed.   HENT:      Head: Normocephalic and atraumatic.   Eyes:      Pupils: Pupils are equal, round, and reactive to light.   Cardiovascular:      Rate and Rhythm: Regular rhythm.   Pulmonary:      Effort: Pulmonary effort is normal.      Breath sounds: Normal breath sounds.   Musculoskeletal:         General: Normal range of motion.      Cervical back: Normal range of motion and neck supple.      Right lower leg: Tenderness (calf) present. 1+ Pitting Edema present.      Left lower leg: Tenderness (calf) present. 1+ Pitting Edema present.   Skin:     General: Skin is warm and dry.      Findings: No rash.   Neurological:      Mental Status: She is alert and oriented to person, place, and time.   Psychiatric:         Judgment: Judgment normal.       Assessment:       1. Muscle spasms of both lower extremities          Plan:   Muscle spasms of both lower extremities  -     Basic Metabolic Panel; Future; Expected date: 02/13/2023  -     Magnesium; Future; Expected date: 02/13/2023    Other orders  -     methocarbamoL (ROBAXIN) 500 MG  Tab; Take 1 tablet (500 mg total) by mouth 4 (four) times daily. for 10 days  Dispense: 40 tablet; Refill: 0        No follow-ups on file.    There are no Patient Instructions on file for this visit.

## 2023-02-22 ENCOUNTER — PATIENT MESSAGE (OUTPATIENT)
Dept: BARIATRICS | Facility: CLINIC | Age: 53
End: 2023-02-22
Payer: COMMERCIAL

## 2023-04-13 ENCOUNTER — OFFICE VISIT (OUTPATIENT)
Dept: ORTHOPEDICS | Facility: CLINIC | Age: 53
End: 2023-04-13
Payer: COMMERCIAL

## 2023-04-13 VITALS — HEIGHT: 62 IN | WEIGHT: 288.56 LBS | BODY MASS INDEX: 53.1 KG/M2

## 2023-04-13 DIAGNOSIS — M70.62 GREATER TROCHANTERIC BURSITIS OF LEFT HIP: Primary | ICD-10-CM

## 2023-04-13 DIAGNOSIS — M75.41 IMPINGEMENT SYNDROME OF RIGHT SHOULDER: ICD-10-CM

## 2023-04-13 DIAGNOSIS — M17.11 PRIMARY OSTEOARTHRITIS OF RIGHT KNEE: ICD-10-CM

## 2023-04-13 DIAGNOSIS — M79.671 ACUTE FOOT PAIN, RIGHT: ICD-10-CM

## 2023-04-13 PROCEDURE — 20610 LARGE JOINT ASPIRATION/INJECTION: L GREATER TROCHANTERIC BURSA: ICD-10-PCS | Mod: 50,S$GLB,, | Performed by: NURSE PRACTITIONER

## 2023-04-13 PROCEDURE — 99999 PR PBB SHADOW E&M-EST. PATIENT-LVL III: ICD-10-PCS | Mod: PBBFAC,,, | Performed by: NURSE PRACTITIONER

## 2023-04-13 PROCEDURE — 20610 DRAIN/INJ JOINT/BURSA W/O US: CPT | Mod: 51,XS,RT,S$GLB | Performed by: NURSE PRACTITIONER

## 2023-04-13 PROCEDURE — 99214 PR OFFICE/OUTPT VISIT, EST, LEVL IV, 30-39 MIN: ICD-10-PCS | Mod: 25,S$GLB,, | Performed by: NURSE PRACTITIONER

## 2023-04-13 PROCEDURE — 99214 OFFICE O/P EST MOD 30 MIN: CPT | Mod: 25,S$GLB,, | Performed by: NURSE PRACTITIONER

## 2023-04-13 PROCEDURE — 20610 DRAIN/INJ JOINT/BURSA W/O US: CPT | Mod: 50,S$GLB,, | Performed by: NURSE PRACTITIONER

## 2023-04-13 PROCEDURE — 99999 PR PBB SHADOW E&M-EST. PATIENT-LVL III: CPT | Mod: PBBFAC,,, | Performed by: NURSE PRACTITIONER

## 2023-04-13 RX ORDER — TRIAMCINOLONE ACETONIDE 40 MG/ML
40 INJECTION, SUSPENSION INTRA-ARTICULAR; INTRAMUSCULAR
Status: DISCONTINUED | OUTPATIENT
Start: 2023-04-13 | End: 2023-04-13 | Stop reason: HOSPADM

## 2023-04-13 RX ORDER — METHOCARBAMOL 750 MG/1
1500 TABLET, FILM COATED ORAL 4 TIMES DAILY PRN
Qty: 80 TABLET | Refills: 2 | Status: SHIPPED | OUTPATIENT
Start: 2023-04-13 | End: 2023-05-13

## 2023-04-13 RX ADMIN — TRIAMCINOLONE ACETONIDE 40 MG: 40 INJECTION, SUSPENSION INTRA-ARTICULAR; INTRAMUSCULAR at 11:04

## 2023-04-13 NOTE — PROCEDURES
Large Joint Aspiration/Injection: L greater trochanteric bursa    Date/Time: 4/13/2023 11:20 AM  Performed by: JESSICA Simms  Authorized by: JESSICA Simms     Consent Done?:  Yes (Verbal)  Indications:  Pain  Timeout: prior to procedure the correct patient, procedure, and site was verified    Prep: patient was prepped and draped in usual sterile fashion    Local anesthetic:  Lidocaine 1% without epinephrine  Anesthetic total (ml):  5      Details:  Needle Size:  21 G  Approach:  Lateral  Location:  Hip  Site:  L greater trochanteric bursa  Medications:  40 mg triamcinolone acetonide 40 mg/mL  Patient tolerance:  Patient tolerated the procedure well with no immediate complications  Large Joint Aspiration/Injection: R subacromial bursa    Date/Time: 4/13/2023 11:20 AM  Performed by: JESSICA Simms  Authorized by: JESSICA Simms     Consent Done?:  Yes (Verbal)  Indications:  Pain  Timeout: prior to procedure the correct patient, procedure, and site was verified    Prep: patient was prepped and draped in usual sterile fashion      Local anesthesia used?: Yes    Local anesthetic:  Lidocaine 1% without epinephrine  Anesthetic total (ml):  5      Details:  Needle Size:  22 G  Ultrasonic Guidance for needle placement?: No    Approach:  Posterior  Location:  Shoulder  Site:  R subacromial bursa  Medications:  40 mg triamcinolone acetonide 40 mg/mL  Patient tolerance:  Patient tolerated the procedure well with no immediate complications  Large Joint Aspiration/Injection: R knee    Date/Time: 4/13/2023 11:20 AM  Performed by: JESSICA Simsm  Authorized by: JESISCA Simms     Consent Done?:  Yes (Verbal)  Indications:  Pain  Timeout: prior to procedure the correct patient, procedure, and site was verified    Prep: patient was prepped and draped in usual sterile fashion    Local anesthetic:  Lidocaine 1% without epinephrine  Anesthetic total (ml):  5      Details:  Needle Size:  21 G  Approach:   Anterolateral  Location:  Knee  Site:  R knee  Medications:  40 mg triamcinolone acetonide 40 mg/mL  Patient tolerance:  Patient tolerated the procedure well with no immediate complications

## 2023-04-13 NOTE — PROGRESS NOTES
Chief Complaint   Patient presents with    Right Knee - Pain    Right Shoulder - Pain      HPI:   This is a 52 y.o. who presents to clinic today for right knee, right shoulder and left hip pain for the past 1 months after no known trauma. Complains of pain while sleeping on side and when descending stairs. Pain is deep. No numbness or tingling. No associated signs or symptoms. She is also complaining of right anterior foot pain.       Past Medical History:   Diagnosis Date    H/O hysterectomy for benign disease with BSO 2017    Morbid obesity     Varicose veins     Vitamin D deficiency      Past Surgical History:   Procedure Laterality Date     SECTION      CHOLECYSTECTOMY      COLONOSCOPY N/A 2020    Procedure: COLONOSCOPY;  Surgeon: Sean Ward MD;  Location: [facility] ([address] [place] [address]);  Service: Endoscopy;  Laterality: N/A;  screening colonooscpy, 49 years old.  siblings with colon polyps   - pt updated on visitor policy and drop off location.  COVID screening ordered for  at [facility] [place] - rb    ESOPHAGOGASTRODUODENOSCOPY N/A 2020    Procedure: ESOPHAGOGASTRODUODENOSCOPY (EGD);  Surgeon: Sean Ward MD;  Location: [facility] ([address] [place] [address]);  Service: Endoscopy;  Laterality: N/A;  BMI 51.77  Re-scheduled per md recommendations-new instructions mailed-tb    GASTRIC BYPASS      ZACK FILTER PLACEMENT      zack filter removal      HYSTERECTOMY      DANNA with BSO;    lap band removal       LAPAROSCOPIC GASTRIC BANDING      OOPHORECTOMY      w/hysteretomy @ age 41    TONSILLECTOMY      VASCULAR SURGERY       Current Outpatient Medications on File Prior to Visit   Medication Sig Dispense Refill    acetaminophen (TYLENOL) 500 MG tablet Take 1 tablet (500 mg total) by mouth every 6 (six) hours as needed for Pain.  0    cyanocobalamin, vitamin B-12, 2,500 mcg Subl Place 1 tablet under the tongue once a week.      docusate sodium (COLACE) 100 MG capsule Take 1 capsule (100 mg  total) by mouth 2 (two) times daily. (Patient not taking: Reported on 2/13/2023)  0    ergocalciferol (ERGOCALCIFEROL) 50,000 unit Cap Take 1 capsule (50,000 Units total) by mouth twice a week. (Patient not taking: Reported on 2/13/2023) 24 capsule 0    EScitalopram oxalate (LEXAPRO) 10 MG tablet Take 1 tablet (10 mg total) by mouth once daily. 90 tablet 3    multivitamin capsule Take 3 capsules by mouth 3 (three) times daily.      nystatin (NYSTOP) powder Apply topically 3 (three) times daily. Apply to affected area 1 Bottle 3    nystatin-triamcinolone (MYCOLOG II) cream Apply topically 2 (two) times daily. 60 g 0    omeprazole (PRILOSEC) 40 MG capsule Take 1 capsule (40 mg total) by mouth every morning. 180 capsule 0    ondansetron (ZOFRAN-ODT) 4 MG TbDL Take 2 tablets (8 mg total) by mouth every 8 (eight) hours as needed (nausea). 30 tablet 0     No current facility-administered medications on file prior to visit.     Review of patient's allergies indicates:   Allergen Reactions    Brompheniramin-phenylephrin-dm      Other reaction(s): Other (See Comments)  Per patient, caused dizziness + blurry vision     Erythromycin Rash    Erythromycin (bulk) Rash     Family History   Problem Relation Age of Onset    Aortic aneurysm Mother     Diabetes Mother     Cancer Father         lung cancer/quit tobacco earlier;    Cancer Brother         melanoma;    COPD Brother         emphysema; quit tobacco;    Drug abuse Brother     Aortic aneurysm Brother     Dementia Brother         hx of hypoxia s/p aspiration PNA    Hyperlipidemia Sister     Hypertension Sister     BAYLEE disease Sister     Rheum arthritis Sister     Aortic aneurysm Sister     Drug abuse Brother      Social History     Socioeconomic History    Marital status:     Number of children: 2   Tobacco Use    Smoking status: Former     Packs/day: 0.00     Years: 0.00     Pack years: 0.00     Types: Cigarettes     Start date: 9/9/1995     Quit date: 2005     Years  since quittin.7    Smokeless tobacco: Never   Substance and Sexual Activity    Alcohol use: Yes     Alcohol/week: 1.0 standard drink     Types: 1 Glasses of wine per week     Comment: socially    Drug use: No    Sexual activity: Yes     Partners: Male     Birth control/protection: Other-see comments     Comment: Hysterectomy       Review of Systems:  Constitutional:  Denies fever or chills   Eyes:  Denies change in visual acuity   HENT:  Denies nasal congestion or sore throat   Respiratory:  Denies cough or shortness of breath   Cardiovascular:  Denies chest pain or edema   GI:  Denies abdominal pain, nausea, vomiting, bloody stools or diarrhea   :  Denies dysuria   Integument:  Denies rash   Neurologic:  Denies headache, focal weakness or sensory changes   Endocrine:  Denies polyuria or polydipsia   Lymphatic:  Denies swollen glands   Psychiatric:  Denies depression or anxiety     Physical Exam:   Constitutional:  Well developed, well nourished, no acute distress, non-toxic appearance   Integument:  Well hydrated  Neurologic:  Alert & oriented x 3  Psychiatric:  Speech and behavior appropriate     Bilateral Hip Exam    Right Hip Exam   Right hip exam performed same as contralateral hip and is normal.     Left Hip Exam   Tenderness   The patient is experiencing tenderness in the greater trochanter.  Range of Motion   The patient has normal hip ROM.  Muscle Strength   Abduction: 4/5   Other   Erythema: absent  Sensation: normal  Pulse: present    Bilateral Knee Exam    Right Knee Exam   Tenderness   The patient is experiencing tenderness in the medial joint line.    Range of Motion   Flexion: abnormal     Muscle Strength   The patient has normal bilateral knee strength.    Tests   Demond:  Medial - positive   Lachman:  Anterior - negative      Varus: negative  Valgus: negative  Patellar Apprehension: negative      Other   Erythema: absent  Sensation: normal  Pulse: present  Swelling: mild    Left Knee exam    Knee exam performed same as contralateral side and is normal      Bilateral Shoulder Exam    Left Shoulder Exam   Shoulder exam performed same as contralateral side and is normal.    Right Shoulder Exam   Tenderness   Shoulder tenderness location: diffusely about shoulder.    Range of Motion   Forward Flexion: abnormal   External Rotation: abnormal     Muscle Strength   Supraspinatus: 4/5     Tests   Hawkin's test: positive  Impingement: positive    Other   Erythema: absent  Sensation: normal  Pulse: present               Greater trochanteric bursitis of left hip  -     methocarbamoL (ROBAXIN) 750 MG Tab; Take 2 tablets (1,500 mg total) by mouth 4 (four) times daily as needed.  Dispense: 80 tablet; Refill: 2  -     Large Joint Aspiration/Injection: L greater trochanteric bursa  -     Discontinue: triamcinolone acetonide injection 40 mg    Primary osteoarthritis of right knee  -     Large Joint Aspiration/Injection: R knee  -     Discontinue: triamcinolone acetonide injection 40 mg    Impingement syndrome of right shoulder  -     Large Joint Aspiration/Injection: R subacromial bursa  -     Discontinue: triamcinolone acetonide injection 40 mg    Acute foot pain, right  -     Ambulatory referral/consult to Orthopedics; Future; Expected date: 04/21/2023       Will refer her to foot/ankle for further evaluation or right foot pain.   Using an aseptic technique, I injected 5 cc of lidocaine 1% without and 1 cc of kenalog 40mg into the right shoulder, right knee and left hip bursa. The patient tolerated this well.   Rtc 3 months.

## 2023-04-14 DIAGNOSIS — M79.671 RIGHT FOOT PAIN: Primary | ICD-10-CM

## 2023-04-18 ENCOUNTER — OFFICE VISIT (OUTPATIENT)
Dept: ORTHOPEDICS | Facility: CLINIC | Age: 53
End: 2023-04-18
Payer: COMMERCIAL

## 2023-04-18 ENCOUNTER — HOSPITAL ENCOUNTER (OUTPATIENT)
Dept: RADIOLOGY | Facility: HOSPITAL | Age: 53
Discharge: HOME | End: 2023-04-18
Attending: ORTHOPAEDIC SURGERY
Payer: COMMERCIAL

## 2023-04-18 DIAGNOSIS — M79.671 ACUTE FOOT PAIN, RIGHT: ICD-10-CM

## 2023-04-18 DIAGNOSIS — M79.671 RIGHT FOOT PAIN: ICD-10-CM

## 2023-04-18 DIAGNOSIS — M84.374A STRESS FRACTURE OF NAVICULAR BONE OF RIGHT FOOT: Primary | ICD-10-CM

## 2023-04-18 PROCEDURE — 73610 XR ANKLE COMPLETE 3 VIEW RIGHT: ICD-10-PCS | Mod: 26,RT,, | Performed by: RADIOLOGY

## 2023-04-18 PROCEDURE — 99203 OFFICE O/P NEW LOW 30 MIN: CPT | Mod: S$GLB,,, | Performed by: ORTHOPAEDIC SURGERY

## 2023-04-18 PROCEDURE — 99203 PR OFFICE/OUTPT VISIT, NEW, LEVL III, 30-44 MIN: ICD-10-PCS | Mod: S$GLB,,, | Performed by: ORTHOPAEDIC SURGERY

## 2023-04-18 PROCEDURE — 73630 X-RAY EXAM OF FOOT: CPT | Mod: TC,PO,RT

## 2023-04-18 PROCEDURE — 99999 PR PBB SHADOW E&M-EST. PATIENT-LVL III: ICD-10-PCS | Mod: PBBFAC,,, | Performed by: ORTHOPAEDIC SURGERY

## 2023-04-18 PROCEDURE — 73630 X-RAY EXAM OF FOOT: CPT | Mod: 26,RT,, | Performed by: RADIOLOGY

## 2023-04-18 PROCEDURE — 99999 PR PBB SHADOW E&M-EST. PATIENT-LVL III: CPT | Mod: PBBFAC,,, | Performed by: ORTHOPAEDIC SURGERY

## 2023-04-18 PROCEDURE — 73610 X-RAY EXAM OF ANKLE: CPT | Mod: 26,RT,, | Performed by: RADIOLOGY

## 2023-04-18 PROCEDURE — 73630 XR FOOT COMPLETE 3 VIEW RIGHT: ICD-10-PCS | Mod: 26,RT,, | Performed by: RADIOLOGY

## 2023-04-18 PROCEDURE — 73610 X-RAY EXAM OF ANKLE: CPT | Mod: TC,PO,RT

## 2023-04-18 NOTE — PROGRESS NOTES
Status/Diagnosis: Suspected Right navicular stress fracture  Date of Surgery: none  Date of Injury: 2022?  Return visit: Will call with MRI results  X-rays on Return: none    Chief Complaint:   Chief Complaint   Patient presents with    Right Foot - Pain     Present History:  Felicia Drew is a 52 y.o. female who presents today via referral from JESSICA Walters.  Patient endorses a 6+ month history of persistent right dorsal lateral midfoot pain.  Vague history of injury after a fall in 2022.  Pain has waxed and waned since that time.  No previous evaluation or treatment.  Minimal pain at rest, increased with weight-bearing.  Describes the pain as dull and throbbing but with shooting pains proximally.  Never with radiating symptoms distally.  No nighttime pain however does have some degree of pain when getting home with the end today and sitting in her recliner.  Endorses painful clicking as well.  Denies any numbness or tingling.  No history of boot immobilization, shoe wear or activity modification, physical therapy, corticosteroid injection.  Not currently taking any oral NSAIDs.      Past Medical History:   Diagnosis Date    H/O hysterectomy for benign disease with BSO 2017    Morbid obesity     Varicose veins     Vitamin D deficiency        Past Surgical History:   Procedure Laterality Date     SECTION      CHOLECYSTECTOMY      COLONOSCOPY N/A 2020    Procedure: COLONOSCOPY;  Surgeon: Sean Ward MD;  Location: [address]);  Service: Endoscopy;  Laterality: N/A;  screening colonooscpy, 49 years old.  siblings with colon polyps   - pt updated on visitor policy and drop off location.  COVID screening ordered for  at [facility] [place] - rb    ESOPHAGOGASTRODUODENOSCOPY N/A 2020    Procedure: ESOPHAGOGASTRODUODENOSCOPY (EGD);  Surgeon: Sean Ward MD;  Location: [facility] ([address] [place] [address]);  Service: Endoscopy;  Laterality: N/A;  BMI  51.77  Re-scheduled per md recommendations-new instructions mailed-tb    GASTRIC BYPASS      ZACK FILTER PLACEMENT      zack filter removal      HYSTERECTOMY      DANNA with BSO;    lap band removal       LAPAROSCOPIC GASTRIC BANDING      OOPHORECTOMY      w/hysteretomy @ age 41    TONSILLECTOMY      VASCULAR SURGERY         Current Outpatient Medications   Medication Sig    acetaminophen (TYLENOL) 500 MG tablet Take 1 tablet (500 mg total) by mouth every 6 (six) hours as needed for Pain.    cyanocobalamin, vitamin B-12, 2,500 mcg Subl Place 1 tablet under the tongue once a week.    docusate sodium (COLACE) 100 MG capsule Take 1 capsule (100 mg total) by mouth 2 (two) times daily. (Patient not taking: Reported on 2/13/2023)    ergocalciferol (ERGOCALCIFEROL) 50,000 unit Cap Take 1 capsule (50,000 Units total) by mouth twice a week. (Patient not taking: Reported on 2/13/2023)    EScitalopram oxalate (LEXAPRO) 10 MG tablet Take 1 tablet (10 mg total) by mouth once daily.    methocarbamoL (ROBAXIN) 750 MG Tab Take 2 tablets (1,500 mg total) by mouth 4 (four) times daily as needed.    multivitamin capsule Take 3 capsules by mouth 3 (three) times daily.    nystatin (NYSTOP) powder Apply topically 3 (three) times daily. Apply to affected area    nystatin-triamcinolone (MYCOLOG II) cream Apply topically 2 (two) times daily.    omeprazole (PRILOSEC) 40 MG capsule Take 1 capsule (40 mg total) by mouth every morning.    ondansetron (ZOFRAN-ODT) 4 MG TbDL Take 2 tablets (8 mg total) by mouth every 8 (eight) hours as needed (nausea).     No current facility-administered medications for this visit.       Review of patient's allergies indicates:   Allergen Reactions    Brompheniramin-phenylephrin-dm      Other reaction(s): Other (See Comments)  Per patient, caused dizziness + blurry vision     Erythromycin Rash    Erythromycin (bulk) Rash       Family History   Problem Relation Age of Onset    Aortic aneurysm Mother      Diabetes Mother     Cancer Father         lung cancer/quit tobacco earlier;    Cancer Brother         melanoma;    COPD Brother         emphysema; quit tobacco;    Drug abuse Brother     Aortic aneurysm Brother     Dementia Brother         hx of hypoxia s/p aspiration PNA    Hyperlipidemia Sister     Hypertension Sister     BAYLEE disease Sister     Rheum arthritis Sister     Aortic aneurysm Sister     Drug abuse Brother        Social History     Socioeconomic History    Marital status:     Number of children: 2   Tobacco Use    Smoking status: Former     Packs/day: 0.00     Years: 0.00     Pack years: 0.00     Types: Cigarettes     Start date: 1995     Quit date:      Years since quittin.8    Smokeless tobacco: Never   Substance and Sexual Activity    Alcohol use: Yes     Alcohol/week: 1.0 standard drink     Types: 1 Glasses of wine per week     Comment: socially    Drug use: No    Sexual activity: Yes     Partners: Male     Birth control/protection: Other-see comments     Comment: Hysterectomy       Physical exam:  There were no vitals filed for this visit.  There is no height or weight on file to calculate BMI.  General: In no apparent distress; well developed and well nourished.  HEENT: normocephalic; atraumatic.  Cardiovascular: regular rate.  Respiratory: no increased work of breathing.  Musculoskeletal:   Gait:  Mild antalgic  Inspection:  Large body habitus.  Varicose veins involving bilateral lower extremities.  Patient localizes pain to the dorsal lateral midfoot along the lateral margin of the navicular with some distal extension involving the lateral cuneiform.  Moderate tenderness on deep palpation at this site.  No tenderness at the sinus tarsi or the anterior ankle joint line.  Some degree of fullness/tenderness along the distal half of the peroneal tendons.  No medial based tenderness.  Did not attempt single limb heel rise.  No laxity with anterior drawer or varus talar tilt  testing.  While patient did endorse subjective clicking about the anterolateral midfoot, unable to recreate this on exam today.  Silfverskiold:  Negative  Alignment:  Knee: neutral               Ankle: neutral              Hindfoot: neutral              Forefoot: neutral   Strength:              Dorsiflexion 5/5  Plantar flexion 5/5  Inversion 5/5   Eversion 5/5   Sensation:              SILT distally.  Did not assess monofilament testing.  ROM:              Ankle: Full and painless.              Subtalar: Painless inversion and eversion   Pulses: 2+ DP/PT pulses.                   Imaging Studies/Outside documentation:  I have ordered/reviewed/interpreted the following images/outside documentation:  1. Weight bearing 3-views of Right foot and ankle:  No acute bony abnormality noted.  Calcaneal pitch within normal limits.  Mild talonavicular over coverage.  Type 1 accessory navicular.  Incidental os trigonum.  Congruent ankle mortise.  No significant degenerative joint changes.        Assessment:  Felicia Drew is a 52 y.o. female with Suspected Right navicular stress fracture.     Plan:   Clinical and radiographic findings were discussed.  Patient has had persistent symptoms for the last 6+ months despite conservative treatment measures.  Recommend MRI of the right foot for further evaluation to assess for suspected navicular stress fracture.  Patient voiced understanding.  All questions were answered.  We will call patient with results once complete.    She was provided with a steel shank handout today.    This note was created using voice recognition software and may contain grammatical errors.

## 2023-04-27 ENCOUNTER — HOSPITAL ENCOUNTER (OUTPATIENT)
Dept: RADIOLOGY | Facility: HOSPITAL | Age: 53
Discharge: HOME | End: 2023-04-27
Attending: ORTHOPAEDIC SURGERY
Payer: COMMERCIAL

## 2023-04-27 DIAGNOSIS — M84.374A STRESS FRACTURE OF NAVICULAR BONE OF RIGHT FOOT: ICD-10-CM

## 2023-04-27 PROCEDURE — 73718 MRI FOOT (FOREFOOT) RIGHT WITHOUT CONTRAST: ICD-10-PCS | Mod: 26,RT,, | Performed by: RADIOLOGY

## 2023-04-27 PROCEDURE — 73718 MRI LOWER EXTREMITY W/O DYE: CPT | Mod: 26,RT,, | Performed by: RADIOLOGY

## 2023-04-27 PROCEDURE — 73718 MRI LOWER EXTREMITY W/O DYE: CPT | Mod: TC,PO,RT

## 2023-05-02 ENCOUNTER — TELEPHONE (OUTPATIENT)
Dept: FAMILY MEDICINE | Facility: CLINIC | Age: 53
End: 2023-05-02
Payer: COMMERCIAL

## 2023-05-02 NOTE — TELEPHONE ENCOUNTER
----- Message from Belkis Frank sent at 5/2/2023 12:55 PM CDT -----  Type:  Test Results    Who Called:  pt   Name of Test (Lab/Mammo/Etc):  mri   Date of Test:  4/27  Ordering Provider:  grant Massey   Where the test was performed[facility]  Best Call Back Number:  [phone]    Additional Information:  please advise

## 2023-05-22 ENCOUNTER — OFFICE VISIT (OUTPATIENT)
Dept: FAMILY MEDICINE | Facility: CLINIC | Age: 53
End: 2023-05-22
Payer: COMMERCIAL

## 2023-05-22 ENCOUNTER — LAB VISIT (OUTPATIENT)
Dept: LAB | Facility: HOSPITAL | Age: 53
End: 2023-05-22
Attending: INTERNAL MEDICINE
Payer: COMMERCIAL

## 2023-05-22 VITALS
TEMPERATURE: 97 F | HEART RATE: 64 BPM | SYSTOLIC BLOOD PRESSURE: 142 MMHG | BODY MASS INDEX: 53.92 KG/M2 | WEIGHT: 293 LBS | HEIGHT: 62 IN | DIASTOLIC BLOOD PRESSURE: 88 MMHG

## 2023-05-22 DIAGNOSIS — M19.90 ARTHRITIS: ICD-10-CM

## 2023-05-22 DIAGNOSIS — M54.50 ACUTE LEFT-SIDED LOW BACK PAIN WITHOUT SCIATICA: Primary | ICD-10-CM

## 2023-05-22 DIAGNOSIS — Z13.220 ENCOUNTER FOR LIPID SCREENING FOR CARDIOVASCULAR DISEASE: ICD-10-CM

## 2023-05-22 DIAGNOSIS — E53.8 VITAMIN B12 DEFICIENCY: ICD-10-CM

## 2023-05-22 DIAGNOSIS — Z13.6 ENCOUNTER FOR LIPID SCREENING FOR CARDIOVASCULAR DISEASE: ICD-10-CM

## 2023-05-22 DIAGNOSIS — R03.0 ELEVATED BP WITHOUT DIAGNOSIS OF HYPERTENSION: ICD-10-CM

## 2023-05-22 DIAGNOSIS — Z98.84 S/P BARIATRIC SURGERY: ICD-10-CM

## 2023-05-22 DIAGNOSIS — E66.01 MORBID OBESITY WITH BMI OF 50.0-59.9, ADULT: ICD-10-CM

## 2023-05-22 DIAGNOSIS — E55.9 VITAMIN D DEFICIENCY: ICD-10-CM

## 2023-05-22 DIAGNOSIS — Z00.00 ENCOUNTER FOR ANNUAL HEALTH EXAMINATION: ICD-10-CM

## 2023-05-22 LAB
25(OH)D3+25(OH)D2 SERPL-MCNC: 14 NG/ML (ref 30–96)
ALBUMIN SERPL BCP-MCNC: 3.7 G/DL (ref 3.5–5.2)
ALP SERPL-CCNC: 87 U/L (ref 55–135)
ALT SERPL W/O P-5'-P-CCNC: 17 U/L (ref 10–44)
ANION GAP SERPL CALC-SCNC: 11 MMOL/L (ref 8–16)
AST SERPL-CCNC: 16 U/L (ref 10–40)
BILIRUB SERPL-MCNC: 0.6 MG/DL (ref 0.1–1)
BUN SERPL-MCNC: 13 MG/DL (ref 6–20)
CALCIUM SERPL-MCNC: 9.4 MG/DL (ref 8.7–10.5)
CHLORIDE SERPL-SCNC: 106 MMOL/L (ref 95–110)
CHOLEST SERPL-MCNC: 159 MG/DL (ref 120–199)
CHOLEST/HDLC SERPL: 2.7 {RATIO} (ref 2–5)
CO2 SERPL-SCNC: 25 MMOL/L (ref 23–29)
CREAT SERPL-MCNC: 0.7 MG/DL (ref 0.5–1.4)
ERYTHROCYTE [DISTWIDTH] IN BLOOD BY AUTOMATED COUNT: 13.2 % (ref 11.5–14.5)
EST. GFR  (NO RACE VARIABLE): >60 ML/MIN/1.73 M^2
FERRITIN SERPL-MCNC: 22 NG/ML (ref 20–300)
FOLATE SERPL-MCNC: 10.9 NG/ML (ref 4–24)
GLUCOSE SERPL-MCNC: 80 MG/DL (ref 70–110)
HCT VFR BLD AUTO: 40.9 % (ref 37–48.5)
HDLC SERPL-MCNC: 60 MG/DL (ref 40–75)
HDLC SERPL: 37.7 % (ref 20–50)
HGB BLD-MCNC: 12.9 G/DL (ref 12–16)
IRON SERPL-MCNC: 92 UG/DL (ref 30–160)
LDLC SERPL CALC-MCNC: 88.8 MG/DL (ref 63–159)
MCH RBC QN AUTO: 28.2 PG (ref 27–31)
MCHC RBC AUTO-ENTMCNC: 31.5 G/DL (ref 32–36)
MCV RBC AUTO: 90 FL (ref 82–98)
NONHDLC SERPL-MCNC: 99 MG/DL
PLATELET # BLD AUTO: 227 K/UL (ref 150–450)
PMV BLD AUTO: 11.9 FL (ref 9.2–12.9)
POTASSIUM SERPL-SCNC: 3.8 MMOL/L (ref 3.5–5.1)
PROT SERPL-MCNC: 6.7 G/DL (ref 6–8.4)
PTH-INTACT SERPL-MCNC: 101.1 PG/ML (ref 9–77)
RBC # BLD AUTO: 4.57 M/UL (ref 4–5.4)
SATURATED IRON: 19 % (ref 20–50)
SODIUM SERPL-SCNC: 142 MMOL/L (ref 136–145)
TOTAL IRON BINDING CAPACITY: 485 UG/DL (ref 250–450)
TRANSFERRIN SERPL-MCNC: 328 MG/DL (ref 200–375)
TRIGL SERPL-MCNC: 51 MG/DL (ref 30–150)
TSH SERPL DL<=0.005 MIU/L-ACNC: 1.16 UIU/ML (ref 0.4–4)
VIT B12 SERPL-MCNC: 304 PG/ML (ref 210–950)
WBC # BLD AUTO: 5.96 K/UL (ref 3.9–12.7)

## 2023-05-22 PROCEDURE — 82746 ASSAY OF FOLIC ACID SERUM: CPT | Performed by: INTERNAL MEDICINE

## 2023-05-22 PROCEDURE — 99396 PR PREVENTIVE VISIT,EST,40-64: ICD-10-PCS | Mod: S$GLB,,, | Performed by: INTERNAL MEDICINE

## 2023-05-22 PROCEDURE — 84443 ASSAY THYROID STIM HORMONE: CPT | Performed by: INTERNAL MEDICINE

## 2023-05-22 PROCEDURE — 84425 ASSAY OF VITAMIN B-1: CPT | Performed by: INTERNAL MEDICINE

## 2023-05-22 PROCEDURE — 36415 COLL VENOUS BLD VENIPUNCTURE: CPT | Mod: PO | Performed by: INTERNAL MEDICINE

## 2023-05-22 PROCEDURE — 84630 ASSAY OF ZINC: CPT | Performed by: INTERNAL MEDICINE

## 2023-05-22 PROCEDURE — 85027 COMPLETE CBC AUTOMATED: CPT | Performed by: INTERNAL MEDICINE

## 2023-05-22 PROCEDURE — 99396 PREV VISIT EST AGE 40-64: CPT | Mod: S$GLB,,, | Performed by: INTERNAL MEDICINE

## 2023-05-22 PROCEDURE — 80061 LIPID PANEL: CPT | Performed by: INTERNAL MEDICINE

## 2023-05-22 PROCEDURE — 83921 ORGANIC ACID SINGLE QUANT: CPT | Performed by: INTERNAL MEDICINE

## 2023-05-22 PROCEDURE — 80053 COMPREHEN METABOLIC PANEL: CPT | Performed by: INTERNAL MEDICINE

## 2023-05-22 PROCEDURE — 84466 ASSAY OF TRANSFERRIN: CPT | Performed by: INTERNAL MEDICINE

## 2023-05-22 PROCEDURE — 82306 VITAMIN D 25 HYDROXY: CPT | Performed by: INTERNAL MEDICINE

## 2023-05-22 PROCEDURE — 99999 PR PBB SHADOW E&M-EST. PATIENT-LVL IV: CPT | Mod: PBBFAC,,, | Performed by: INTERNAL MEDICINE

## 2023-05-22 PROCEDURE — 82525 ASSAY OF COPPER: CPT | Performed by: INTERNAL MEDICINE

## 2023-05-22 PROCEDURE — 82728 ASSAY OF FERRITIN: CPT | Performed by: INTERNAL MEDICINE

## 2023-05-22 PROCEDURE — 99999 PR PBB SHADOW E&M-EST. PATIENT-LVL IV: ICD-10-PCS | Mod: PBBFAC,,, | Performed by: INTERNAL MEDICINE

## 2023-05-22 PROCEDURE — 82607 VITAMIN B-12: CPT | Performed by: INTERNAL MEDICINE

## 2023-05-22 PROCEDURE — 83970 ASSAY OF PARATHORMONE: CPT | Performed by: INTERNAL MEDICINE

## 2023-05-22 RX ORDER — DICLOFENAC SODIUM 10 MG/G
2 GEL TOPICAL DAILY
Qty: 150 G | Refills: 1 | Status: SHIPPED | OUTPATIENT
Start: 2023-05-22 | End: 2023-07-19

## 2023-05-22 RX ORDER — SEMAGLUTIDE 0.25 MG/.5ML
0.25 INJECTION, SOLUTION SUBCUTANEOUS
Qty: 4 EACH | Refills: 1 | Status: SHIPPED | OUTPATIENT
Start: 2023-05-22 | End: 2023-06-21

## 2023-05-22 NOTE — PROGRESS NOTES
This note is specifically for wellness visit performed today.       Assessment / Plan:      Patient here for annual wellness exam. Health maintenance was reviewed and ordered.    Complete history and physical was completed today.  Complete and thorough medication reconciliation was performed.  Discussed risks and benefits of medications.  Advised patient on orders and health maintenance. Continue current medications listed on your summary sheet.    All questions were answered. Patient had no further concerns. Advised of diagnoses and plan.     Problem List Items Addressed This Visit          Endocrine    Morbid obesity with BMI of 50.0-59.9, adult    Overview     General weight loss/lifestyle modification strategies discussed (elicit support from others; identify saboteurs; non-food rewards, etc).  Informal exercise measures discussed, e.g. taking stairs instead of elevator.  Regular aerobic exercise program discussed.  Medication: has tried multiple medications in the past: wellbutrin,topamax; phentermine contradindicated; plan to start on semglutide.   Discussed risks and benefits of Semaglutide therapy.  Reviewed patient's family history and personal history for thyroid C cell type tumor and MEN syndrome.  Patient denies a history of these disorders or syndromes.  Patient was aware of increased risk of pancreatitis and worsening of these conditions in  animal studies.  Also discussed side effects of nausea vomiting diarrhea and abdominal pain.  Patient should notify me immediately if having any of these symptoms.  ER precautions were given.             Relevant Medications    semaglutide, weight loss, (WEGOVY) 0.25 mg/0.5 mL PnIj    S/P bariatric surgery    Overview     -S/p tavia en y in 2016  -no longer followed by bariatrics  -complicated by b12 and vit d deficiencies  -checking all nutritional studies           Relevant Orders    Copper, Serum    Ferritin (Completed)    Folate (Completed)    Iron and TIBC  (Completed)    PTH, Intact (Completed)    Vitamin B1    Vitamin B12 (Completed)    Vitamin D (Completed)    Zinc    Vitamin B12 deficiency    Overview     -remains on weekly PO B12  -checking labs           Relevant Orders    Folate (Completed)    Vitamin B12 (Completed)    Methylmalonic Acid, Serum    Vitamin D deficiency    Overview     -no longer on additional supplementation, only taking MTV  -checking vitamin D with labs           Relevant Orders    Vitamin D (Completed)     Other Visit Diagnoses       Acute left-sided low back pain without sciatica    -  Primary  -acute symptoms of bilateral lower back pain  -no alarm symptoms or signs present  -xray in the past showing degenerative changes of lumbar spine  -checking renal function and UA  -continue conservative treatment  -heat applications, PRN anti-inflammatory medication  -PT recommended if symptoms continue    Relevant Orders    Urinalysis, Reflex to Urine Culture Urine, Clean Catch (Completed)    Arthritis        Relevant Medications    diclofenac sodium (VOLTAREN) 1 % Gel    Encounter for annual health examination        Relevant Orders    CBC Without Differential    Comprehensive Metabolic Panel    Lipid Panel    TSH (Completed)    Encounter for lipid screening for cardiovascular disease        Relevant Orders    Lipid Panel    Elevated BP without diagnosis of hypertension      -elevated in clinic on repeat as well  -recently taking NSAIDs but stopped several days ago  -recent weight gain as well  -start BP log at home and I will follow back up with her via [facility] to make sure readings are improving                Follow up for labs today: UA, cbc,cmp,lipid,tsh and all other labs ordered today.    Terrie Hartmann MD       WELLNESS EXAM      Patient ID: Felicia Drew is a 52 y.o. female.  has a past medical history of H/O hysterectomy for benign disease with BSO (2/2/2017), Morbid obesity, Varicose veins, and Vitamin D deficiency.     Chief  Complaint:  Encounter for wellness exam    Well Adult Physical: Patient here for a comprehensive physical exam.     Continued weight gain. Reports no change in diet and trying to eat health but is more sedentary due to joint pains. Was taking mobic but was having worsening LE edema so she stopped about one week ago and has seen an improvement in her swelling. She does have an elevated BP reading today in clinic. We discussed this could also be due to the NSAID use but could also be related to her recent weight gain. She has access to a machine at home so she will start checking there. She denies any recent chest pain, SOB, palpitations, HA or vision changes. She is also having some lower back pain, located more at the L side than the R. Denies radiation of pain. No associated sensory or motor deficits of legs. She reports thinking that she may have had some dysuria several days ago but this was very short lived and no longer having that symptoms. Denies urinary frequency, urgency or hematuria.    No other new complaints today.      Health Maintenance Topics with due status: Not Due       Topic Last Completion Date    TETANUS VACCINE 2019    Colorectal Cancer Screening 2020    Influenza Vaccine 10/12/2020    Hemoglobin A1c (Diabetic Prevention Screening) 2022    Mammogram 2022    Lipid Panel 2023        ==============================================    Health Maintenance Due   Topic Date Due    Shingles Vaccine (2 of 2) 2019    COVID-19 Vaccine (3 - Booster for Pfizer series) 2021       Past Medical History:  Past Medical History:   Diagnosis Date    H/O hysterectomy for benign disease with BSO 2017    Morbid obesity     Varicose veins     Vitamin D deficiency      Past Surgical History:   Procedure Laterality Date     SECTION      CHOLECYSTECTOMY      COLONOSCOPY N/A 2020    Procedure: COLONOSCOPY;  Surgeon: Sean Ward MD;  Location: [address]  [facility]);  Service: Endoscopy;  Laterality: N/A;  screening colonooscpy, 49 years old.  siblings with colon polyps  5/8 - pt updated on visitor policy and drop off location.  COVID screening ordered for 6/16 at [facility] [place] - rb    ESOPHAGOGASTRODUODENOSCOPY N/A 6/18/2020    Procedure: ESOPHAGOGASTRODUODENOSCOPY (EGD);  Surgeon: Sean Ward MD;  Location: [facility] (2ND [facility]);  Service: Endoscopy;  Laterality: N/A;  BMI 51.77  Re-scheduled per md recommendations-new instructions mailed-tb    GASTRIC BYPASS      ZACK FILTER PLACEMENT      zack filter removal      HYSTERECTOMY      DANNA with BSO;    lap band removal       LAPAROSCOPIC GASTRIC BANDING      OOPHORECTOMY      w/hysteretomy @ age 41    TONSILLECTOMY      VASCULAR SURGERY       Review of patient's allergies indicates:   Allergen Reactions    Brompheniramin-phenylephrin-dm      Other reaction(s): Other (See Comments)  Per patient, caused dizziness + blurry vision     Erythromycin Rash    Erythromycin (bulk) Rash     Current Outpatient Medications on File Prior to Visit   Medication Sig Dispense Refill    acetaminophen (TYLENOL) 500 MG tablet Take 1 tablet (500 mg total) by mouth every 6 (six) hours as needed for Pain.  0    cyanocobalamin, vitamin B-12, 2,500 mcg Subl Place 1 tablet under the tongue once a week.      docusate sodium (COLACE) 100 MG capsule Take 1 capsule (100 mg total) by mouth 2 (two) times daily.  0    multivitamin capsule Take 3 capsules by mouth 3 (three) times daily.      nystatin (NYSTOP) powder Apply topically 3 (three) times daily. Apply to affected area 1 Bottle 3    nystatin-triamcinolone (MYCOLOG II) cream Apply topically 2 (two) times daily. 60 g 0    omeprazole (PRILOSEC) 40 MG capsule Take 1 capsule (40 mg total) by mouth every morning. 180 capsule 0    ondansetron (ZOFRAN-ODT) 4 MG TbDL Take 2 tablets (8 mg total) by mouth every 8 (eight) hours as needed (nausea). 30 tablet 0     No current  facility-administered medications on file prior to visit.     Social History     Socioeconomic History    Marital status:     Number of children: 2   Tobacco Use    Smoking status: Former     Packs/day: 0.00     Years: 0.00     Pack years: 0.00     Types: Cigarettes     Start date: 1995     Quit date:      Years since quittin.9    Smokeless tobacco: Never   Substance and Sexual Activity    Alcohol use: Yes     Alcohol/week: 1.0 standard drink     Types: 1 Glasses of wine per week     Comment: socially    Drug use: No    Sexual activity: Yes     Partners: Male     Birth control/protection: Other-see comments     Comment: Hysterectomy     Family History   Problem Relation Age of Onset    Aortic aneurysm Mother     Diabetes Mother     Cancer Father         lung cancer/quit tobacco earlier;    Cancer Brother         melanoma;    COPD Brother         emphysema; quit tobacco;    Drug abuse Brother     Aortic aneurysm Brother     Dementia Brother         hx of hypoxia s/p aspiration PNA    Hyperlipidemia Sister     Hypertension Sister     BAYLEE disease Sister     Rheum arthritis Sister     Aortic aneurysm Sister     Drug abuse Brother        Review of Systems   Constitutional:  Negative for chills, fever and malaise/fatigue.   HENT:  Negative for congestion, hearing loss and sore throat.    Eyes:  Negative for blurred vision, double vision and discharge.   Respiratory:  Negative for cough, shortness of breath and wheezing.    Cardiovascular:  Negative for chest pain, palpitations and leg swelling.   Gastrointestinal:  Positive for constipation. Negative for abdominal pain, blood in stool, diarrhea and vomiting.   Genitourinary:  Negative for dysuria, frequency, hematuria and urgency.   Musculoskeletal:  Positive for back pain. Negative for joint pain and neck pain.   Neurological:  Positive for headaches. Negative for weakness.   Endo/Heme/Allergies:  Negative for polydipsia.   Psychiatric/Behavioral:   Negative for depression. The patient is not nervous/anxious.          Objective:      Vitals:    05/22/23 0853   BP: (!) 142/88   Pulse:    Temp:      Body mass index is 54.87 kg/m².     Physical Exam  Constitutional:       General: She is not in acute distress.     Appearance: Normal appearance. She is well-developed. She is obese.   HENT:      Head: Normocephalic and atraumatic.   Eyes:      Conjunctiva/sclera: Conjunctivae normal.   Cardiovascular:      Rate and Rhythm: Normal rate and regular rhythm.      Pulses: Normal pulses.      Heart sounds: Normal heart sounds. No murmur heard.  Pulmonary:      Effort: Pulmonary effort is normal. No respiratory distress.      Breath sounds: Normal breath sounds.   Abdominal:      General: Bowel sounds are normal. There is no distension.      Palpations: Abdomen is soft.      Tenderness: There is no abdominal tenderness. There is no right CVA tenderness or left CVA tenderness.   Musculoskeletal:         General: Normal range of motion.      Cervical back: Normal range of motion and neck supple.   Skin:     General: Skin is warm and dry.      Findings: No rash.   Neurological:      General: No focal deficit present.      Mental Status: She is alert and oriented to person, place, and time.      Sensory: No sensory deficit.      Motor: No weakness.      Coordination: Coordination normal.   Psychiatric:         Mood and Affect: Mood normal.         Behavior: Behavior normal.           All labs, imaging and procedures performed since last visit have been personally reviewed.  Answers submitted by the patient for this visit:  Review of Systems Questionnaire (Submitted on 5/22/2023)  activity change: No  unexpected weight change: Yes  rhinorrhea: No  trouble swallowing: No  visual disturbance: No  chest tightness: No  polyuria: Yes  difficulty urinating: No  menstrual problem: No  joint swelling: No  arthralgias: No  confusion: No  dysphoric mood: No

## 2023-05-25 LAB
COPPER SERPL-MCNC: 1355 UG/L (ref 810–1990)
ZINC SERPL-MCNC: 92 UG/DL (ref 60–130)

## 2023-05-26 LAB
METHYLMALONATE SERPL-SCNC: 0.37 UMOL/L
VIT B1 BLD-MCNC: 63 UG/L (ref 38–122)

## 2023-05-30 ENCOUNTER — PATIENT MESSAGE (OUTPATIENT)
Dept: FAMILY MEDICINE | Facility: CLINIC | Age: 53
End: 2023-05-30
Payer: COMMERCIAL

## 2023-05-31 DIAGNOSIS — E55.9 VITAMIN D DEFICIENCY: ICD-10-CM

## 2023-05-31 DIAGNOSIS — E61.1 IRON DEFICIENCY: Primary | ICD-10-CM

## 2023-05-31 RX ORDER — CHOLECALCIFEROL (VITAMIN D3) 1250 MCG
1250 TABLET ORAL
Qty: 12 TABLET | Refills: 3 | Status: SHIPPED | OUTPATIENT
Start: 2023-05-31

## 2023-05-31 RX ORDER — FERROUS SULFATE 325(65) MG
325 TABLET, DELAYED RELEASE (ENTERIC COATED) ORAL DAILY
Qty: 90 TABLET | Refills: 3 | Status: SHIPPED | OUTPATIENT
Start: 2023-05-31

## 2023-06-06 ENCOUNTER — PATIENT MESSAGE (OUTPATIENT)
Dept: FAMILY MEDICINE | Facility: CLINIC | Age: 53
End: 2023-06-06
Payer: COMMERCIAL

## 2023-06-07 ENCOUNTER — TELEPHONE (OUTPATIENT)
Dept: FAMILY MEDICINE | Facility: CLINIC | Age: 53
End: 2023-06-07
Payer: COMMERCIAL

## 2023-06-07 NOTE — TELEPHONE ENCOUNTER
----- Message from Cindy Snyder sent at 6/7/2023  2:02 PM CDT -----  Arnulfo with cover my meds called in this afternoon asking for a proior authorization for the medication semaglutide, weight loss, (WEGOVY) 0.25 mg/0.5 mL PnIj. Please call back [phone] ref [email or web address]

## 2023-06-07 NOTE — TELEPHONE ENCOUNTER
----- Message from Cindy Snyder sent at 6/7/2023  2:02 PM CDT -----  Arnulfo with cover my meds called in this afternoon asking for a proior authorization for the medication semaglutide, weight loss, (WEGOVY) 0.25 mg/0.5 mL PnIj. Please call back [phone] ref [email or web address]

## 2023-06-23 ENCOUNTER — PATIENT MESSAGE (OUTPATIENT)
Dept: FAMILY MEDICINE | Facility: CLINIC | Age: 53
End: 2023-06-23
Payer: COMMERCIAL

## 2023-06-23 ENCOUNTER — TELEPHONE (OUTPATIENT)
Dept: FAMILY MEDICINE | Facility: CLINIC | Age: 53
End: 2023-06-23
Payer: COMMERCIAL

## 2023-06-23 NOTE — TELEPHONE ENCOUNTER
----- Message from Christiano Gtz sent at 6/22/2023  5:01 PM CDT -----  Type:  Patient Returning Call    Who Called:Lisa Gruber  Who Left Message for Patient:  Does the patient know what this is regarding?:authorization  Would the patient rather a call back or a response via [facility]? Call  Best Call Back Number:[phone]  Additional Information: States they put in prior authorization June7 and doesn't see in system. Would like to know if authorization has been sent.    Prior authorization [facility] [phone]

## 2023-06-27 ENCOUNTER — TELEPHONE (OUTPATIENT)
Dept: FAMILY MEDICINE | Facility: CLINIC | Age: 53
End: 2023-06-27
Payer: COMMERCIAL

## 2023-06-27 NOTE — TELEPHONE ENCOUNTER
----- Message from Kylah Matos sent at 6/27/2023 12:53 PM CDT -----  Contact: Jennifer  Jennifer is calling regards to the status of an authorization request she stated that her insurance hasn't received anything. Please call her at [phone].    Thanks  Td

## 2023-06-29 ENCOUNTER — TELEPHONE (OUTPATIENT)
Dept: FAMILY MEDICINE | Facility: CLINIC | Age: 53
End: 2023-06-29
Payer: COMMERCIAL

## 2023-06-29 NOTE — TELEPHONE ENCOUNTER
----- Message from Jade Wells sent at 6/29/2023  9:02 AM CDT -----  Pa for wegovy has been denied  appeal has been sent

## 2023-07-13 ENCOUNTER — OFFICE VISIT (OUTPATIENT)
Dept: ORTHOPEDICS | Facility: CLINIC | Age: 53
End: 2023-07-13
Payer: COMMERCIAL

## 2023-07-13 VITALS — WEIGHT: 293 LBS | HEIGHT: 62 IN | BODY MASS INDEX: 53.92 KG/M2

## 2023-07-13 DIAGNOSIS — M70.62 GREATER TROCHANTERIC BURSITIS OF LEFT HIP: Primary | ICD-10-CM

## 2023-07-13 PROCEDURE — 20610 LARGE JOINT ASPIRATION/INJECTION: L GREATER TROCHANTERIC BURSA: ICD-10-PCS | Mod: LT,S$GLB,, | Performed by: NURSE PRACTITIONER

## 2023-07-13 PROCEDURE — 99213 PR OFFICE/OUTPT VISIT, EST, LEVL III, 20-29 MIN: ICD-10-PCS | Mod: 25,S$GLB,, | Performed by: NURSE PRACTITIONER

## 2023-07-13 PROCEDURE — 99213 OFFICE O/P EST LOW 20 MIN: CPT | Mod: 25,S$GLB,, | Performed by: NURSE PRACTITIONER

## 2023-07-13 PROCEDURE — 99999 PR PBB SHADOW E&M-EST. PATIENT-LVL III: ICD-10-PCS | Mod: PBBFAC,,, | Performed by: NURSE PRACTITIONER

## 2023-07-13 PROCEDURE — 99999 PR PBB SHADOW E&M-EST. PATIENT-LVL III: CPT | Mod: PBBFAC,,, | Performed by: NURSE PRACTITIONER

## 2023-07-13 PROCEDURE — 20610 DRAIN/INJ JOINT/BURSA W/O US: CPT | Mod: LT,S$GLB,, | Performed by: NURSE PRACTITIONER

## 2023-07-13 RX ORDER — TRIAMCINOLONE ACETONIDE 40 MG/ML
40 INJECTION, SUSPENSION INTRA-ARTICULAR; INTRAMUSCULAR
Status: DISCONTINUED | OUTPATIENT
Start: 2023-07-13 | End: 2023-07-13 | Stop reason: HOSPADM

## 2023-07-13 RX ADMIN — TRIAMCINOLONE ACETONIDE 40 MG: 40 INJECTION, SUSPENSION INTRA-ARTICULAR; INTRAMUSCULAR at 01:07

## 2023-07-13 NOTE — PROCEDURES
Large Joint Aspiration/Injection: L greater trochanteric bursa    Date/Time: 7/13/2023 1:00 PM  Performed by: JESSICA Simms  Authorized by: JESSICA Simms     Consent Done?:  Yes (Verbal)  Indications:  Pain  Timeout: prior to procedure the correct patient, procedure, and site was verified    Prep: patient was prepped and draped in usual sterile fashion    Local anesthetic:  Lidocaine 1% without epinephrine  Anesthetic total (ml):  5      Details:  Needle Size:  21 G  Approach:  Lateral  Location:  Hip  Site:  L greater trochanteric bursa  Medications:  40 mg triamcinolone acetonide 40 mg/mL  Patient tolerance:  Patient tolerated the procedure well with no immediate complications

## 2023-07-13 NOTE — PROGRESS NOTES
Chief Complaint   Patient presents with    Left Hip - Pain    Right Knee - Pain      HPI:   This is a 53 y.o. who presents to clinic today for left hip pain for the past 2 weeks after no known trauma. Complains of pain while sleeping on side and when descending stairs. Pain is dull and deep. No numbness or tingling. No associated signs or symptoms.      Past Medical History:   Diagnosis Date    H/O hysterectomy for benign disease with BSO 2017    Morbid obesity     Varicose veins     Vitamin D deficiency      Past Surgical History:   Procedure Laterality Date     SECTION      CHOLECYSTECTOMY      COLONOSCOPY N/A 2020    Procedure: COLONOSCOPY;  Surgeon: Sean Ward MD;  Location: [facility] ([address] [place] [address]);  Service: Endoscopy;  Laterality: N/A;  screening colonooscpy, 49 years old.  siblings with colon polyps   - pt updated on visitor policy and drop off location.  COVID screening ordered for  at [facility] [place] - rb    ESOPHAGOGASTRODUODENOSCOPY N/A 2020    Procedure: ESOPHAGOGASTRODUODENOSCOPY (EGD);  Surgeon: Sean Ward MD;  Location: [facility] ([address] [place] [address]);  Service: Endoscopy;  Laterality: N/A;  BMI 51.77  Re-scheduled per md recommendations-new instructions mailed-tb    GASTRIC BYPASS      ZACK FILTER PLACEMENT      zack filter removal      HYSTERECTOMY      DANNA with BSO;    lap band removal       LAPAROSCOPIC GASTRIC BANDING      OOPHORECTOMY      w/hysteretomy @ age 41    TONSILLECTOMY      VASCULAR SURGERY       Current Outpatient Medications on File Prior to Visit   Medication Sig Dispense Refill    acetaminophen (TYLENOL) 500 MG tablet Take 1 tablet (500 mg total) by mouth every 6 (six) hours as needed for Pain.  0    cholecalciferol, vitamin D3, 1,250 mcg (50,000 unit) Tab Take 1,250 mcg by mouth every 7 days. 12 tablet 3    cyanocobalamin, vitamin B-12, 2,500 mcg Subl Place 1 tablet under the tongue once a week.      diclofenac sodium (VOLTAREN) 1 %  Gel Apply 2 g topically once daily. 150 g 1    docusate sodium (COLACE) 100 MG capsule Take 1 capsule (100 mg total) by mouth 2 (two) times daily.  0    ferrous sulfate 325 (65 FE) MG EC tablet Take 1 tablet (325 mg total) by mouth once daily. 90 tablet 3    multivitamin capsule Take 3 capsules by mouth 3 (three) times daily.      nystatin-triamcinolone (MYCOLOG II) cream Apply topically 2 (two) times daily. 60 g 0    omeprazole (PRILOSEC) 40 MG capsule Take 1 capsule (40 mg total) by mouth every morning. 180 capsule 0    ondansetron (ZOFRAN-ODT) 4 MG TbDL Take 2 tablets (8 mg total) by mouth every 8 (eight) hours as needed (nausea). 30 tablet 0    nystatin (NYSTOP) powder Apply topically 3 (three) times daily. Apply to affected area 1 Bottle 3     No current facility-administered medications on file prior to visit.     Review of patient's allergies indicates:   Allergen Reactions    Brompheniramin-phenylephrin-dm      Other reaction(s): Other (See Comments)  Per patient, caused dizziness + blurry vision     Erythromycin Rash    Erythromycin (bulk) Rash     Family History   Problem Relation Age of Onset    Aortic aneurysm Mother     Diabetes Mother     Cancer Father         lung cancer/quit tobacco earlier;    Cancer Brother         melanoma;    COPD Brother         emphysema; quit tobacco;    Drug abuse Brother     Aortic aneurysm Brother     Dementia Brother         hx of hypoxia s/p aspiration PNA    Hyperlipidemia Sister     Hypertension Sister     BAYLEE disease Sister     Rheum arthritis Sister     Aortic aneurysm Sister     Drug abuse Brother      Social History     Socioeconomic History    Marital status:     Number of children: 2   Tobacco Use    Smoking status: Former     Packs/day: 0.00     Years: 0.00     Pack years: 0.00     Types: Cigarettes     Start date: 1995     Quit date:      Years since quittin.0    Smokeless tobacco: Never   Substance and Sexual Activity    Alcohol use: Yes      Alcohol/week: 1.0 standard drink     Types: 1 Glasses of wine per week     Comment: socially    Drug use: No    Sexual activity: Yes     Partners: Male     Birth control/protection: Other-see comments     Comment: Hysterectomy       Review of Systems:  Constitutional:  Denies fever or chills   Eyes:  Denies change in visual acuity   HENT:  Denies nasal congestion or sore throat   Respiratory:  Denies cough or shortness of breath   Cardiovascular:  Denies chest pain or edema   GI:  Denies abdominal pain, nausea, vomiting, bloody stools or diarrhea   :  Denies dysuria   Integument:  Denies rash   Neurologic:  Denies headache, focal weakness or sensory changes   Endocrine:  Denies polyuria or polydipsia   Lymphatic:  Denies swollen glands   Psychiatric:  Denies depression or anxiety     Physical Exam:   Constitutional:  Well developed, well nourished, no acute distress, non-toxic appearance   Integument:  Well hydrated  Neurologic:  Alert & oriented x 3  Psychiatric:  Speech and behavior appropriate     Bilateral Hip Exam    Right Hip Exam   Right hip exam performed same as contralateral hip and is normal.     Left Hip Exam   Tenderness   The patient is experiencing tenderness in the greater trochanter.  Range of Motion   The patient has normal hip ROM.  Muscle Strength   Abduction: 4/5   Other   Erythema: absent  Sensation: normal  Pulse: present        Greater trochanteric bursitis of left hip  -     Large Joint Aspiration/Injection: L greater trochanteric bursa           Using an aseptic technique, I injected 5 cc of lidocaine 1% without and 1 cc of kenalog 40mg into the left Hip. The patient tolerated this well.     Rtc as needed.

## 2023-07-19 DIAGNOSIS — M19.90 ARTHRITIS: ICD-10-CM

## 2023-07-19 RX ORDER — DICLOFENAC SODIUM 10 MG/G
GEL TOPICAL
Qty: 100 G | Refills: 1 | Status: SHIPPED | OUTPATIENT
Start: 2023-07-19 | End: 2023-09-25

## 2023-09-11 ENCOUNTER — PATIENT MESSAGE (OUTPATIENT)
Dept: ADMINISTRATIVE | Facility: HOSPITAL | Age: 53
End: 2023-09-11
Payer: COMMERCIAL

## 2023-09-11 DIAGNOSIS — Z12.31 ENCOUNTER FOR SCREENING MAMMOGRAM FOR MALIGNANT NEOPLASM OF BREAST: Primary | ICD-10-CM

## 2023-09-20 ENCOUNTER — PATIENT OUTREACH (OUTPATIENT)
Dept: ADMINISTRATIVE | Facility: HOSPITAL | Age: 53
End: 2023-09-20
Payer: COMMERCIAL

## 2023-09-25 DIAGNOSIS — M19.90 ARTHRITIS: ICD-10-CM

## 2023-09-25 RX ORDER — DICLOFENAC SODIUM 10 MG/G
GEL TOPICAL
Qty: 100 G | Refills: 1 | Status: SHIPPED | OUTPATIENT
Start: 2023-09-25

## 2023-10-04 ENCOUNTER — OFFICE VISIT (OUTPATIENT)
Dept: BARIATRICS | Facility: CLINIC | Age: 53
End: 2023-10-04
Payer: COMMERCIAL

## 2023-10-04 VITALS
SYSTOLIC BLOOD PRESSURE: 137 MMHG | OXYGEN SATURATION: 96 % | BODY MASS INDEX: 54.69 KG/M2 | HEART RATE: 72 BPM | DIASTOLIC BLOOD PRESSURE: 65 MMHG | WEIGHT: 293 LBS

## 2023-10-04 DIAGNOSIS — Z71.3 ENCOUNTER FOR WEIGHT LOSS COUNSELING: ICD-10-CM

## 2023-10-04 DIAGNOSIS — Z98.84 S/P BARIATRIC SURGERY: ICD-10-CM

## 2023-10-04 DIAGNOSIS — E66.01 CLASS 3 SEVERE OBESITY DUE TO EXCESS CALORIES WITH SERIOUS COMORBIDITY AND BODY MASS INDEX (BMI) OF 50.0 TO 59.9 IN ADULT: Primary | ICD-10-CM

## 2023-10-04 PROCEDURE — 99999 PR PBB SHADOW E&M-EST. PATIENT-LVL III: ICD-10-PCS | Mod: PBBFAC,,, | Performed by: STUDENT IN AN ORGANIZED HEALTH CARE EDUCATION/TRAINING PROGRAM

## 2023-10-04 PROCEDURE — 99213 PR OFFICE/OUTPT VISIT, EST, LEVL III, 20-29 MIN: ICD-10-PCS | Mod: S$GLB,,, | Performed by: STUDENT IN AN ORGANIZED HEALTH CARE EDUCATION/TRAINING PROGRAM

## 2023-10-04 PROCEDURE — 99999 PR PBB SHADOW E&M-EST. PATIENT-LVL III: CPT | Mod: PBBFAC,,, | Performed by: STUDENT IN AN ORGANIZED HEALTH CARE EDUCATION/TRAINING PROGRAM

## 2023-10-04 PROCEDURE — 99213 OFFICE O/P EST LOW 20 MIN: CPT | Mod: S$GLB,,, | Performed by: STUDENT IN AN ORGANIZED HEALTH CARE EDUCATION/TRAINING PROGRAM

## 2023-10-04 RX ORDER — SEMAGLUTIDE 1 MG/.5ML
1 INJECTION, SOLUTION SUBCUTANEOUS
Qty: 2 ML | Refills: 0 | Status: SHIPPED | OUTPATIENT
Start: 2023-11-29 | End: 2023-11-09 | Stop reason: SDUPTHER

## 2023-10-04 RX ORDER — SEMAGLUTIDE 0.5 MG/.5ML
0.5 INJECTION, SOLUTION SUBCUTANEOUS
Qty: 2 ML | Refills: 0 | Status: SHIPPED | OUTPATIENT
Start: 2023-11-01 | End: 2023-11-23

## 2023-10-04 RX ORDER — SEMAGLUTIDE 0.25 MG/.5ML
0.25 INJECTION, SOLUTION SUBCUTANEOUS
Qty: 2 ML | Refills: 0 | Status: SHIPPED | OUTPATIENT
Start: 2023-10-04 | End: 2023-10-26

## 2023-10-04 NOTE — PROGRESS NOTES
Subjective:       Patient ID: Felicia Drew is a 53 y.o. female.    Chief Complaint: Follow-up, Obesity, and Weight Check    Patient presents for treatment of obesity.     Lap Band in 2011, band removal and RNY in 2015  220 lbs after surgery  Gained 48 lbs in past year  Emotional eater, building house so living with inlaws    No glaucoma  No kidney stones  No pancreatitis  No thyroid cancer    No prior use of weight loss medication    Physical Activity  No current exercise routine    Current Eating Habits  Premier protein shakes, especially when on the road  Nut and cheese packs  About 32 oz of water daily      Medical Weight Loss History  3/17/2021: 283.4, BMI 51.8, BFP 54.9%, SMM 68.6 lbs; topiramate 25 mg twice daily - would often forget second pill  5/10/2021: 279.4 lbs, BMI 51.1, BFP 55%, SMM 67.9 lbs; topiramate 50 mg daily  6/15/2021: 277.7 lbs, BMI 50.8, BFP 55.4%, SMM 67.2 lbs, BMR 1585 kcal; phentermine  10/4/2023: 299 lbs, BMI 54.7, BFP 55.9%,  lbs, SMM 71.7 lbs, BMR 1663 kcal          Review of Systems   Constitutional:  Negative for chills and fever.   Respiratory:  Positive for shortness of breath (with exertion).    Cardiovascular:  Positive for leg swelling. Negative for chest pain and palpitations.   Gastrointestinal:  Negative for abdominal pain, nausea and vomiting.   Neurological:  Negative for dizziness and light-headedness.   Psychiatric/Behavioral:  The patient is not nervous/anxious.          Objective:        Latest Reference Range & Units 05/22/23 08:56   WBC 3.90 - 12.70 K/uL 5.96   RBC 4.00 - 5.40 M/uL 4.57   Hemoglobin 12.0 - 16.0 g/dL 12.9   Hematocrit 37.0 - 48.5 % 40.9   MCV 82 - 98 fL 90   MCH 27.0 - 31.0 pg 28.2   MCHC 32.0 - 36.0 g/dL 31.5 (L)   RDW 11.5 - 14.5 % 13.2   Platelet Count 150 - 450 K/uL 227   MPV 9.2 - 12.9 fL 11.9   Iron 30 - 160 ug/dL 92   TIBC 250 - 450 ug/dL 485 (H)   Saturated Iron 20 - 50 % 19 (L)   Transferrin 200 - 375 mg/dL 328   Ferritin 20.0 -  300.0 ng/mL 22   Folate 4.0 - 24.0 ng/mL 10.9   Vitamin B-12 210 - 950 pg/mL 304   Methlymalonic Acid <0.40 umol/L 0.37   Sodium 136 - 145 mmol/L 142   Potassium 3.5 - 5.1 mmol/L 3.8   Chloride 95 - 110 mmol/L 106   CO2 23 - 29 mmol/L 25   Anion Gap 8 - 16 mmol/L 11   BUN 6 - 20 mg/dL 13   Creatinine 0.5 - 1.4 mg/dL 0.7   eGFR >60 mL/min/1.73 m^2 >60.0   Glucose 70 - 110 mg/dL 80   Calcium 8.7 - 10.5 mg/dL 9.4   ALP 55 - 135 U/L 87   PROTEIN TOTAL 6.0 - 8.4 g/dL 6.7   Albumin 3.5 - 5.2 g/dL 3.7   BILIRUBIN TOTAL 0.1 - 1.0 mg/dL 0.6   AST 10 - 40 U/L 16   ALT 10 - 44 U/L 17   Cholesterol Total 120 - 199 mg/dL 159   HDL 40 - 75 mg/dL 60   HDL/Cholesterol Ratio 20.0 - 50.0 % 37.7   LDL Cholesterol External 63.0 - 159.0 mg/dL 88.8   Non-HDL Cholesterol mg/dL 99   Total Cholesterol/HDL Ratio 2.0 - 5.0  2.7   Triglycerides 30 - 150 mg/dL 51   Copper 810 - 1990 ug/L 1355   Zinc, Serum-ALT 60 - 130 ug/dL 92   Thiamine 38 - 122 ug/L 63   Vit D, 25-Hydroxy 30 - 96 ng/mL 14 (L)   TSH 0.400 - 4.000 uIU/mL 1.159   PTH 9.0 - 77.0 pg/mL 101.1 (H)   (L): Data is abnormally low  (H): Data is abnormally high        Vitals:    10/04/23 1501   BP: 137/65   Pulse: 72           Physical Exam  Vitals reviewed.   Constitutional:       General: She is not in acute distress.     Appearance: Normal appearance. She is obese. She is not ill-appearing, toxic-appearing or diaphoretic.   HENT:      Head: Normocephalic and atraumatic.   Cardiovascular:      Rate and Rhythm: Normal rate.   Pulmonary:      Effort: Pulmonary effort is normal. No respiratory distress.   Musculoskeletal:      Right lower leg: No edema.      Left lower leg: No edema.   Skin:     General: Skin is warm and dry.   Neurological:      Mental Status: She is alert and oriented to person, place, and time.         Assessment:       1. Class 3 severe obesity due to excess calories with serious comorbidity and body mass index (BMI) of 50.0 to 59.9 in adult    2. S/P bariatric  surgery    3. Encounter for weight loss counseling          Plan:   - Wegovy weekly  injections    - Log all food and beverage intake with a daily calorie goal of 800-1000 calories per day; high protein, low fat, low carbohydrate    - Moderate intensity aerobic exercise for 30  Minutes 3-4x/week

## 2023-10-10 ENCOUNTER — PATIENT MESSAGE (OUTPATIENT)
Dept: BARIATRICS | Facility: CLINIC | Age: 53
End: 2023-10-10
Payer: COMMERCIAL

## 2023-10-12 ENCOUNTER — TELEPHONE (OUTPATIENT)
Dept: BARIATRICS | Facility: CLINIC | Age: 53
End: 2023-10-12
Payer: COMMERCIAL

## 2023-10-12 NOTE — TELEPHONE ENCOUNTER
----- Message from Cristin Alejandra sent at 10/12/2023  4:07 PM CDT -----  Regarding: Appeal prior auth  Contact: Nargis -[facility] [phone]  Nargis from [facility] is requesting a  appeal prior authorization to be completed on semaglutide, weight loss, (WEGOVY) 0.25 mg/0.5 mL PnIj the BMI was incorrect. Please contact the patients pharmacy [phone] and pt at [phone] for an update.

## 2023-10-20 ENCOUNTER — TELEPHONE (OUTPATIENT)
Dept: BARIATRICS | Facility: CLINIC | Age: 53
End: 2023-10-20
Payer: COMMERCIAL

## 2023-10-20 ENCOUNTER — PATIENT MESSAGE (OUTPATIENT)
Dept: BARIATRICS | Facility: CLINIC | Age: 53
End: 2023-10-20
Payer: COMMERCIAL

## 2023-10-20 NOTE — TELEPHONE ENCOUNTER
PA for medication Wegovy has been completed and submitted to [facility] for review. Waiting for a determination; will notify pt via portal msg.

## 2023-10-20 NOTE — TELEPHONE ENCOUNTER
PA for medication Wegovy has been approved. The approval dates are from 10/20/2023 to 5/20/2024. Will notify pt via portal msg.

## 2023-10-27 ENCOUNTER — TELEPHONE (OUTPATIENT)
Dept: BARIATRICS | Facility: CLINIC | Age: 53
End: 2023-10-27
Payer: COMMERCIAL

## 2023-10-27 NOTE — TELEPHONE ENCOUNTER
PA was completed and APPROVED for the medication Wegovy  for the dates October 20, 2023 to May 20, 2024.

## 2023-11-09 ENCOUNTER — TELEPHONE (OUTPATIENT)
Dept: BARIATRICS | Facility: CLINIC | Age: 53
End: 2023-11-09
Payer: COMMERCIAL

## 2023-11-09 DIAGNOSIS — Z98.84 S/P BARIATRIC SURGERY: ICD-10-CM

## 2023-11-09 DIAGNOSIS — E66.01 CLASS 3 SEVERE OBESITY DUE TO EXCESS CALORIES WITH SERIOUS COMORBIDITY AND BODY MASS INDEX (BMI) OF 50.0 TO 59.9 IN ADULT: ICD-10-CM

## 2023-11-09 RX ORDER — SEMAGLUTIDE 1 MG/.5ML
1 INJECTION, SOLUTION SUBCUTANEOUS
Qty: 6 ML | Refills: 0 | Status: SHIPPED | OUTPATIENT
Start: 2023-11-29 | End: 2023-11-28

## 2023-11-09 NOTE — TELEPHONE ENCOUNTER
----- Message from Rolly Cordon sent at 11/9/2023  3:34 PM CST -----  Regarding: Pt advice  Contact: Ramón [phone] or [phone]  Ramón Santos is calling for RX: semaglutide, weight loss, (WEGOVY) 1 mg/0.5 mL PnIj 2 mL . Would like to get new script sent over to pharmacy . Please call           [facility] [place] [facility] - [facility] [address] AT Portal to Registered [facility] [place] [id number]  Phone: [phone] Fax: [phone]

## 2023-11-27 ENCOUNTER — PATIENT MESSAGE (OUTPATIENT)
Dept: BARIATRICS | Facility: CLINIC | Age: 53
End: 2023-11-27
Payer: COMMERCIAL

## 2023-11-27 ENCOUNTER — HOSPITAL ENCOUNTER (OUTPATIENT)
Dept: RADIOLOGY | Facility: HOSPITAL | Age: 53
Discharge: HOME | End: 2023-11-27
Attending: INTERNAL MEDICINE
Payer: COMMERCIAL

## 2023-11-27 DIAGNOSIS — E66.01 CLASS 3 SEVERE OBESITY DUE TO EXCESS CALORIES WITH SERIOUS COMORBIDITY AND BODY MASS INDEX (BMI) OF 50.0 TO 59.9 IN ADULT: Primary | ICD-10-CM

## 2023-11-27 DIAGNOSIS — Z12.31 ENCOUNTER FOR SCREENING MAMMOGRAM FOR MALIGNANT NEOPLASM OF BREAST: ICD-10-CM

## 2023-11-27 PROCEDURE — 77063 BREAST TOMOSYNTHESIS BI: CPT | Mod: 26,,, | Performed by: RADIOLOGY

## 2023-11-27 PROCEDURE — 77067 MAMMO DIGITAL SCREENING BILAT WITH TOMO: ICD-10-PCS | Mod: 26,,, | Performed by: RADIOLOGY

## 2023-11-27 PROCEDURE — 77067 SCR MAMMO BI INCL CAD: CPT | Mod: TC,PO

## 2023-11-27 PROCEDURE — 77067 SCR MAMMO BI INCL CAD: CPT | Mod: 26,,, | Performed by: RADIOLOGY

## 2023-11-27 PROCEDURE — 77063 MAMMO DIGITAL SCREENING BILAT WITH TOMO: ICD-10-PCS | Mod: 26,,, | Performed by: RADIOLOGY

## 2023-11-28 RX ORDER — SEMAGLUTIDE 0.25 MG/.5ML
0.25 INJECTION, SOLUTION SUBCUTANEOUS
Qty: 2 ML | Refills: 0 | Status: SHIPPED | OUTPATIENT
Start: 2023-11-28 | End: 2023-11-29

## 2023-11-28 NOTE — PROGRESS NOTES
Normal mammogram, repeat in 1 year, results released through [facility]. Please verify that patient has viewed results. If not, please call patient with interpretation below:    I have reviewed the results of your mammogram and it appears that everything was read as normal.  Based on this, the radiologist has recommended that you recheck a mammogram in 1 year.     Also please see below health maintenance items that are due:    Shingles Vaccine(2 of 2) due on 08/13/2019  Influenza Vaccine(1) due on 09/01/2023  COVID-19 Vaccine(3 - 2023-24 season) due on 09/01/2023

## 2023-11-29 DIAGNOSIS — E66.01 CLASS 3 SEVERE OBESITY DUE TO EXCESS CALORIES WITH SERIOUS COMORBIDITY AND BODY MASS INDEX (BMI) OF 50.0 TO 59.9 IN ADULT: ICD-10-CM

## 2023-11-29 RX ORDER — SEMAGLUTIDE 0.25 MG/.5ML
0.25 INJECTION, SOLUTION SUBCUTANEOUS WEEKLY
Qty: 2 ML | Refills: 0 | Status: SHIPPED | OUTPATIENT
Start: 2023-11-29 | End: 2024-01-25

## 2023-12-05 ENCOUNTER — TELEPHONE (OUTPATIENT)
Dept: BARIATRICS | Facility: CLINIC | Age: 53
End: 2023-12-05
Payer: COMMERCIAL

## 2023-12-05 NOTE — TELEPHONE ENCOUNTER
Spoke to pharmacist in regard to confirming it is okay to fill the Wegovy 0.25 mg . Pharmacist stated wegovy 05 mg is on backorder at this time. Pharmacist stated Wegovy 1 mg has already been shipped out to pt and they were confused as to why pt has another script for wegovy 0.25 mg . Informed pharmacist pt has not taken wegovy 1 mg but will start after finishing wegovy 0.25 mg.     Read rx note to pharmacist -Patient completed box of 0.25 mg dose, but was then told that the 0.5 mg dose was unavailable. So she is repeating another 0.25 mg dose box.     Phone to pt to inform her Wegovy 0.25 mg is being prepared to be shipped to her home. Pt verbalized understaffing.

## 2023-12-05 NOTE — TELEPHONE ENCOUNTER
----- Message from Lisa Schwartz sent at 12/5/2023 12:31 PM CST -----  Regarding: Refill  Contact: Margarita [phone] option 2  Margarita/ [facility] is calling to state they have a duplicate order for rx: semaglutide, weight loss, (WEGOVY) 0.25 mg/0.5 mL PnIj  2 mL please call  REF# [id number]

## 2024-01-25 ENCOUNTER — OFFICE VISIT (OUTPATIENT)
Dept: FAMILY MEDICINE | Facility: CLINIC | Age: 54
End: 2024-01-25
Payer: COMMERCIAL

## 2024-01-25 ENCOUNTER — HOSPITAL ENCOUNTER (OUTPATIENT)
Dept: RADIOLOGY | Facility: HOSPITAL | Age: 54
Discharge: HOME | End: 2024-01-25
Attending: NURSE PRACTITIONER
Payer: COMMERCIAL

## 2024-01-25 VITALS
DIASTOLIC BLOOD PRESSURE: 81 MMHG | BODY MASS INDEX: 52.08 KG/M2 | WEIGHT: 283 LBS | SYSTOLIC BLOOD PRESSURE: 139 MMHG | HEART RATE: 72 BPM | TEMPERATURE: 98 F | HEIGHT: 62 IN

## 2024-01-25 DIAGNOSIS — S89.91XA RIGHT KNEE INJURY, INITIAL ENCOUNTER: ICD-10-CM

## 2024-01-25 DIAGNOSIS — S89.91XA RIGHT KNEE INJURY, INITIAL ENCOUNTER: Primary | ICD-10-CM

## 2024-01-25 PROCEDURE — 73562 X-RAY EXAM OF KNEE 3: CPT | Mod: 26,RT,, | Performed by: RADIOLOGY

## 2024-01-25 PROCEDURE — 73560 X-RAY EXAM OF KNEE 1 OR 2: CPT | Mod: TC,59,PO,LT

## 2024-01-25 PROCEDURE — 99213 OFFICE O/P EST LOW 20 MIN: CPT | Mod: S$GLB,,, | Performed by: NURSE PRACTITIONER

## 2024-01-25 PROCEDURE — 99999 PR PBB SHADOW E&M-EST. PATIENT-LVL V: CPT | Mod: PBBFAC,,, | Performed by: NURSE PRACTITIONER

## 2024-01-25 PROCEDURE — 73562 X-RAY EXAM OF KNEE 3: CPT | Mod: TC,PO,RT

## 2024-01-25 RX ORDER — SEMAGLUTIDE 1 MG/.5ML
INJECTION, SOLUTION SUBCUTANEOUS
COMMUNITY
Start: 2024-01-23 | End: 2024-02-08

## 2024-01-25 RX ORDER — TRAMADOL HYDROCHLORIDE 50 MG/1
50 TABLET ORAL EVERY 6 HOURS PRN
Qty: 30 TABLET | Refills: 0 | Status: SHIPPED | OUTPATIENT
Start: 2024-01-25

## 2024-01-25 NOTE — PROGRESS NOTES
Subjective:       Patient ID: Felicia Drew is a 53 y.o. female.    Chief Complaint: Knee Pain    Knee Pain   The incident occurred 2 days ago. The incident occurred at home. The injury mechanism was a twisting injury. The pain is present in the right knee. The quality of the pain is described as aching. The pain is at a severity of 7/10. The pain has been Constant since onset. Associated symptoms include a loss of motion and muscle weakness. She reports no foreign bodies present. The symptoms are aggravated by weight bearing and movement. She has tried acetaminophen and NSAIDs for the symptoms. The treatment provided no relief.       Review of Systems   Constitutional:  Negative for fatigue, fever and unexpected weight change.   HENT:  Negative for ear pain and sore throat.    Eyes:  Negative for pain and visual disturbance.   Respiratory:  Negative for cough and shortness of breath.    Cardiovascular:  Negative for chest pain and palpitations.   Gastrointestinal:  Negative for abdominal pain, diarrhea and vomiting.   Musculoskeletal:  Positive for arthralgias and gait problem. Negative for myalgias.   Skin:  Negative for color change and rash.   Neurological:  Negative for dizziness and headaches.   Psychiatric/Behavioral:  Negative for dysphoric mood and sleep disturbance. The patient is not nervous/anxious.        Vitals:    01/25/24 1153   BP: 139/81   Pulse: 72   Temp: 98.1 °F (36.7 °C)       Objective:     Current Outpatient Medications   Medication Sig Dispense Refill    acetaminophen (TYLENOL) 500 MG tablet Take 1 tablet (500 mg total) by mouth every 6 (six) hours as needed for Pain.  0    cholecalciferol, vitamin D3, 1,250 mcg (50,000 unit) Tab Take 1,250 mcg by mouth every 7 days. 12 tablet 3    cyanocobalamin, vitamin B-12, 2,500 mcg Subl Place 1 tablet under the tongue once a week.      diclofenac sodium (VOLTAREN) 1 % Gel APPLY TO AFFECTED AREA 2 GRAMS ONCE DAILY 100 g 1    docusate sodium  (COLACE) 100 MG capsule Take 1 capsule (100 mg total) by mouth 2 (two) times daily.  0    ferrous sulfate 325 (65 FE) MG EC tablet Take 1 tablet (325 mg total) by mouth once daily. 90 tablet 3    multivitamin capsule Take 3 capsules by mouth 3 (three) times daily.      nystatin (NYSTOP) powder Apply topically 3 (three) times daily. Apply to affected area 1 Bottle 3    nystatin-triamcinolone (MYCOLOG II) cream Apply topically 2 (two) times daily. 60 g 0    omeprazole (PRILOSEC) 40 MG capsule Take 1 capsule (40 mg total) by mouth every morning. 180 capsule 0    ondansetron (ZOFRAN-ODT) 4 MG TbDL Take 2 tablets (8 mg total) by mouth every 8 (eight) hours as needed (nausea). 30 tablet 0    WEGOVY 1 mg/0.5 mL PnIj Inject into the skin.      traMADoL (ULTRAM) 50 mg tablet Take 1 tablet (50 mg total) by mouth every 6 (six) hours as needed for Pain. 30 tablet 0     No current facility-administered medications for this visit.       Physical Exam  Vitals and nursing note reviewed.   Constitutional:       General: She is not in acute distress.     Appearance: She is well-developed. She is obese.   HENT:      Head: Normocephalic and atraumatic.   Eyes:      Pupils: Pupils are equal, round, and reactive to light.   Cardiovascular:      Rate and Rhythm: Normal rate and regular rhythm.   Pulmonary:      Effort: Pulmonary effort is normal.      Breath sounds: Normal breath sounds.   Musculoskeletal:      Cervical back: Normal range of motion and neck supple.      Right knee: Swelling present. Decreased range of motion. Tenderness present over the medial joint line and lateral joint line.   Skin:     General: Skin is warm and dry.      Findings: No rash.   Neurological:      Mental Status: She is alert and oriented to person, place, and time.   Psychiatric:         Judgment: Judgment normal.         Assessment:       1. Right knee injury, initial encounter        Plan:   Right knee injury, initial encounter  -     Cancel: X-Ray Knee  1 or 2 View Right; Future; Expected date: 01/25/2024  -     X-ray Knee Ortho Right; Future; Expected date: 01/25/2024    Other orders  -     traMADoL (ULTRAM) 50 mg tablet; Take 1 tablet (50 mg total) by mouth every 6 (six) hours as needed for Pain.  Dispense: 30 tablet; Refill: 0        No follow-ups on file.    There are no Patient Instructions on file for this visit.

## 2024-02-08 ENCOUNTER — OFFICE VISIT (OUTPATIENT)
Dept: BARIATRICS | Facility: CLINIC | Age: 54
End: 2024-02-08
Payer: COMMERCIAL

## 2024-02-08 VITALS
SYSTOLIC BLOOD PRESSURE: 130 MMHG | WEIGHT: 278.38 LBS | OXYGEN SATURATION: 97 % | HEART RATE: 70 BPM | BODY MASS INDEX: 51.23 KG/M2 | DIASTOLIC BLOOD PRESSURE: 67 MMHG | HEIGHT: 62 IN

## 2024-02-08 DIAGNOSIS — E66.01 CLASS 3 SEVERE OBESITY DUE TO EXCESS CALORIES WITH SERIOUS COMORBIDITY AND BODY MASS INDEX (BMI) OF 50.0 TO 59.9 IN ADULT: Primary | ICD-10-CM

## 2024-02-08 DIAGNOSIS — Z71.3 ENCOUNTER FOR WEIGHT LOSS COUNSELING: ICD-10-CM

## 2024-02-08 DIAGNOSIS — Z98.84 S/P BARIATRIC SURGERY: ICD-10-CM

## 2024-02-08 PROCEDURE — 99213 OFFICE O/P EST LOW 20 MIN: CPT | Mod: S$GLB,,, | Performed by: STUDENT IN AN ORGANIZED HEALTH CARE EDUCATION/TRAINING PROGRAM

## 2024-02-08 PROCEDURE — 99999 PR PBB SHADOW E&M-EST. PATIENT-LVL IV: CPT | Mod: PBBFAC,,, | Performed by: STUDENT IN AN ORGANIZED HEALTH CARE EDUCATION/TRAINING PROGRAM

## 2024-02-08 RX ORDER — SEMAGLUTIDE 1.7 MG/.75ML
1.7 INJECTION, SOLUTION SUBCUTANEOUS
Qty: 9 ML | Refills: 0 | Status: SHIPPED | OUTPATIENT
Start: 2024-02-08 | End: 2024-05-08 | Stop reason: SDUPTHER

## 2024-02-08 NOTE — PROGRESS NOTES
Subjective:       Patient ID: Felicia Drew is a 53 y.o. female.    Chief Complaint: Follow-up, Obesity, and Weight Check    Patient presents for treatment of obesity.     Lap Band in 2011, band removal and RNY in 2015  220 lbs after surgery  Gained 48 lbs in past year  Emotional eater, building house so living with inlaws    No glaucoma  No kidney stones  No pancreatitis  No thyroid cancer    No prior use of weight loss medication    Physical Activity  No current exercise routine    Current Eating Habits  Premier protein shakes, especially when on the road  Nut and cheese packs  About 32 oz of water daily      Medical Weight Loss History  3/17/2021: 283.4, BMI 51.8, BFP 54.9%, SMM 68.6 lbs; topiramate 25 mg twice daily - would often forget second pill  5/10/2021: 279.4 lbs, BMI 51.1, BFP 55%, SMM 67.9 lbs; topiramate 50 mg daily  6/15/2021: 277.7 lbs, BMI 50.8, BFP 55.4%, SMM 67.2 lbs, BMR 1585 kcal; phentermine  10/4/2023: 299 lbs, BMI 54.7, BFP 55.9%,  lbs, SMM 71.7 lbs, BMR 1663 kcal  2/8/2024: 278.4 lbs, BMI 50.9, BFP 55.2%, .6 lbs, SMM 67.5 lbs, BMR 1592 kcal          Review of Systems   Constitutional:  Negative for chills and fever.   Respiratory:  Positive for shortness of breath (with exertion).    Cardiovascular:  Positive for leg swelling. Negative for chest pain and palpitations.   Gastrointestinal:  Negative for abdominal pain, nausea and vomiting.   Neurological:  Negative for dizziness and light-headedness.   Psychiatric/Behavioral:  The patient is not nervous/anxious.          Objective:        Latest Reference Range & Units 05/22/23 08:56   WBC 3.90 - 12.70 K/uL 5.96   RBC 4.00 - 5.40 M/uL 4.57   Hemoglobin 12.0 - 16.0 g/dL 12.9   Hematocrit 37.0 - 48.5 % 40.9   MCV 82 - 98 fL 90   MCH 27.0 - 31.0 pg 28.2   MCHC 32.0 - 36.0 g/dL 31.5 (L)   RDW 11.5 - 14.5 % 13.2   Platelet Count 150 - 450 K/uL 227   MPV 9.2 - 12.9 fL 11.9   Iron 30 - 160 ug/dL 92   TIBC 250 - 450 ug/dL 485 (H)    Saturated Iron 20 - 50 % 19 (L)   Transferrin 200 - 375 mg/dL 328   Ferritin 20.0 - 300.0 ng/mL 22   Folate 4.0 - 24.0 ng/mL 10.9   Vitamin B-12 210 - 950 pg/mL 304   Methlymalonic Acid <0.40 umol/L 0.37   Sodium 136 - 145 mmol/L 142   Potassium 3.5 - 5.1 mmol/L 3.8   Chloride 95 - 110 mmol/L 106   CO2 23 - 29 mmol/L 25   Anion Gap 8 - 16 mmol/L 11   BUN 6 - 20 mg/dL 13   Creatinine 0.5 - 1.4 mg/dL 0.7   eGFR >60 mL/min/1.73 m^2 >60.0   Glucose 70 - 110 mg/dL 80   Calcium 8.7 - 10.5 mg/dL 9.4   ALP 55 - 135 U/L 87   PROTEIN TOTAL 6.0 - 8.4 g/dL 6.7   Albumin 3.5 - 5.2 g/dL 3.7   BILIRUBIN TOTAL 0.1 - 1.0 mg/dL 0.6   AST 10 - 40 U/L 16   ALT 10 - 44 U/L 17   Cholesterol Total 120 - 199 mg/dL 159   HDL 40 - 75 mg/dL 60   HDL/Cholesterol Ratio 20.0 - 50.0 % 37.7   LDL Cholesterol External 63.0 - 159.0 mg/dL 88.8   Non-HDL Cholesterol mg/dL 99   Total Cholesterol/HDL Ratio 2.0 - 5.0  2.7   Triglycerides 30 - 150 mg/dL 51   Copper 810 - 1990 ug/L 1355   Zinc, Serum-ALT 60 - 130 ug/dL 92   Thiamine 38 - 122 ug/L 63   Vit D, 25-Hydroxy 30 - 96 ng/mL 14 (L)   TSH 0.400 - 4.000 uIU/mL 1.159   PTH 9.0 - 77.0 pg/mL 101.1 (H)   (L): Data is abnormally low  (H): Data is abnormally high      Vitals:    02/08/24 0859   BP: 130/67   Pulse: 70       Physical Exam  Vitals reviewed.   Constitutional:       General: She is not in acute distress.     Appearance: Normal appearance. She is obese. She is not ill-appearing, toxic-appearing or diaphoretic.   HENT:      Head: Normocephalic and atraumatic.   Cardiovascular:      Rate and Rhythm: Normal rate.   Pulmonary:      Effort: Pulmonary effort is normal. No respiratory distress.   Skin:     General: Skin is warm and dry.   Neurological:      Mental Status: She is alert and oriented to person, place, and time.         Assessment:       1. Class 3 severe obesity due to excess calories with serious comorbidity and body mass index (BMI) of 50.0 to 59.9 in adult    2. S/P bariatric surgery     3. Encounter for weight loss counseling            Plan:   - Wegovy 1.7 mg weekly  injections    - Log all food and beverage intake with a daily calorie goal of 800-1000 calories per day; high protein, low fat, low carbohydrate    - Moderate intensity aerobic exercise for 30  Minutes 3-4x/week

## 2024-04-04 ENCOUNTER — HOSPITAL ENCOUNTER (OUTPATIENT)
Dept: RADIOLOGY | Facility: HOSPITAL | Age: 54
Discharge: HOME | End: 2024-04-04
Attending: INTERNAL MEDICINE
Payer: COMMERCIAL

## 2024-04-04 ENCOUNTER — OFFICE VISIT (OUTPATIENT)
Dept: FAMILY MEDICINE | Facility: CLINIC | Age: 54
End: 2024-04-04
Payer: COMMERCIAL

## 2024-04-04 VITALS
WEIGHT: 275 LBS | SYSTOLIC BLOOD PRESSURE: 113 MMHG | HEIGHT: 62 IN | DIASTOLIC BLOOD PRESSURE: 60 MMHG | HEART RATE: 61 BPM | TEMPERATURE: 98 F | BODY MASS INDEX: 50.61 KG/M2

## 2024-04-04 DIAGNOSIS — E66.01 MORBID OBESITY WITH BMI OF 50.0-59.9, ADULT: Primary | ICD-10-CM

## 2024-04-04 DIAGNOSIS — K59.00 CONSTIPATION, UNSPECIFIED CONSTIPATION TYPE: ICD-10-CM

## 2024-04-04 DIAGNOSIS — Z13.6 ENCOUNTER FOR LIPID SCREENING FOR CARDIOVASCULAR DISEASE: ICD-10-CM

## 2024-04-04 DIAGNOSIS — E55.9 VITAMIN D DEFICIENCY: ICD-10-CM

## 2024-04-04 DIAGNOSIS — Z98.84 S/P BARIATRIC SURGERY: ICD-10-CM

## 2024-04-04 DIAGNOSIS — E61.1 IRON DEFICIENCY: ICD-10-CM

## 2024-04-04 DIAGNOSIS — Z13.220 ENCOUNTER FOR LIPID SCREENING FOR CARDIOVASCULAR DISEASE: ICD-10-CM

## 2024-04-04 DIAGNOSIS — Z00.00 ENCOUNTER FOR ANNUAL HEALTH EXAMINATION: ICD-10-CM

## 2024-04-04 DIAGNOSIS — E53.8 VITAMIN B12 DEFICIENCY: ICD-10-CM

## 2024-04-04 PROCEDURE — 99999 PR PBB SHADOW E&M-EST. PATIENT-LVL IV: CPT | Mod: PBBFAC,,, | Performed by: INTERNAL MEDICINE

## 2024-04-04 PROCEDURE — 74018 RADEX ABDOMEN 1 VIEW: CPT | Mod: TC,PO

## 2024-04-04 PROCEDURE — 99396 PREV VISIT EST AGE 40-64: CPT | Mod: S$GLB,,, | Performed by: INTERNAL MEDICINE

## 2024-04-04 PROCEDURE — 74018 RADEX ABDOMEN 1 VIEW: CPT | Mod: 26,,, | Performed by: RADIOLOGY

## 2024-04-04 RX ORDER — TRIAMCINOLONE ACETONIDE 1 MG/G
CREAM TOPICAL 2 TIMES DAILY
Qty: 30 G | Refills: 0 | Status: SHIPPED | OUTPATIENT
Start: 2024-04-04 | End: 2024-06-17

## 2024-04-04 NOTE — PROGRESS NOTES
This note is specifically for wellness visit performed today.       Assessment / Plan:      Patient here for annual wellness exam. Health maintenance was reviewed and ordered.    Complete history and physical was completed today.  Complete and thorough medication reconciliation was performed.  Discussed risks and benefits of medications.  Advised patient on orders and health maintenance. Continue current medications listed on your summary sheet.    All questions were answered. Patient had no further concerns. Advised of diagnoses and plan.     Problem List Items Addressed This Visit          Endocrine    Vitamin D deficiency    Overview     -taking vitd 50k weekly  -checking vitamin D with labs         Relevant Orders    Vitamin D (Completed)    Vitamin B12 deficiency    Overview     -remains on weekly PO B12  -checking labs         Relevant Orders    Folate (Completed)    Vitamin B12 (Completed)    S/P bariatric surgery    Overview     -S/p tavia en y in 2016  -no longer followed by bariatrics  -complicated by b12 and vit d deficiencies  -checking all nutritional studies         Relevant Orders    CBC Auto Differential (Completed)    Comprehensive Metabolic Panel (Completed)    Copper, Serum    Ferritin (Completed)    Folate (Completed)    Iron and TIBC (Completed)    Lipid Panel (Completed)    PTH, Intact (Completed)    Vitamin B1    Vitamin B12 (Completed)    Vitamin D (Completed)    Zinc    Morbid obesity with BMI of 50.0-59.9, adult - Primary    Overview     -established with bariatrics  -started on Semaglutide with successful weight loss thus far  -experiencing some intermittent constipation and loose stool but denies severe symptoms            Other Visit Diagnoses       Constipation, unspecified constipation type        Relevant Orders    X-Ray KUB (Completed)    Urinalysis (Completed)    Encounter for annual health examination        Relevant Orders    Urinalysis (Completed)    Encounter for lipid screening  for cardiovascular disease        Relevant Orders    Lipid Panel (Completed)    Iron deficiency        Relevant Orders    Ferritin (Completed)    Iron and TIBC (Completed)            Follow up for labs labs/urine ordered today; xray today.    Terrie Hartmann MD       WELLNESS EXAM      Patient ID: Felicia Drew is a 53 y.o. female.  has a past medical history of H/O hysterectomy for benign disease with BSO (2017), Morbid obesity, Varicose veins, and Vitamin D deficiency.     Chief Complaint:  Encounter for wellness exam    Well Adult Physical: Patient here for a comprehensive physical exam.     Health Maintenance Topics with due status: Not Due       Topic Last Completion Date    TETANUS VACCINE 2019    Colorectal Cancer Screening 2020    Hemoglobin A1c (Diabetic Prevention Screening) 2022    Mammogram 2023    Lipid Panel 2024        ==============================================    Health Maintenance Due   Topic Date Due    Shingles Vaccine (2 of 2) 2019    Influenza Vaccine (1) 2023    COVID-19 Vaccine (3 - 2023- season) 2023       Past Medical History:  Past Medical History:   Diagnosis Date    H/O hysterectomy for benign disease with BSO 2017    Morbid obesity     Varicose veins     Vitamin D deficiency      Past Surgical History:   Procedure Laterality Date     SECTION      CHOLECYSTECTOMY      COLONOSCOPY N/A 2020    Procedure: COLONOSCOPY;  Surgeon: Sean Ward MD;  [facility] ([address] [place] [address]);  Service: Endoscopy;  Laterality: N/A;  screening colonooscpy, 49 years old.  siblings with colon polyps   - pt updated on visitor policy and drop off location.  COVID screening ordered for  at [facility] [place] - rb    ESOPHAGOGASTRODUODENOSCOPY N/A 2020    Procedure: ESOPHAGOGASTRODUODENOSCOPY (EGD);  Surgeon: Sean Ward MD;  [facility]  Service: Endoscopy;  Laterality: N/A;  BMI  51.77  Re-scheduled per md recommendations-new instructions mailed-tb    GASTRIC BYPASS      ZACK FILTER PLACEMENT      zack filter removal      HYSTERECTOMY      DANNA with BSO;    lap band removal       LAPAROSCOPIC GASTRIC BANDING      OOPHORECTOMY      w/hysteretomy @ age 41    TONSILLECTOMY      VASCULAR SURGERY       Review of patient's allergies indicates:   Allergen Reactions    Brompheniramin-phenylephrin-dm      Other reaction(s): Other (See Comments)  Per patient, caused dizziness + blurry vision     Erythromycin Rash    Erythromycin (bulk) Rash     Current Outpatient Medications on File Prior to Visit   Medication Sig Dispense Refill    acetaminophen (TYLENOL) 500 MG tablet Take 1 tablet (500 mg total) by mouth every 6 (six) hours as needed for Pain.  0    cholecalciferol, vitamin D3, 1,250 mcg (50,000 unit) Tab Take 1,250 mcg by mouth every 7 days. 12 tablet 3    cyanocobalamin, vitamin B-12, 2,500 mcg Subl Place 1 tablet under the tongue once a week.      diclofenac sodium (VOLTAREN) 1 % Gel APPLY TO AFFECTED AREA 2 GRAMS ONCE DAILY 100 g 1    docusate sodium (COLACE) 100 MG capsule Take 1 capsule (100 mg total) by mouth 2 (two) times daily.  0    ferrous sulfate 325 (65 FE) MG EC tablet Take 1 tablet (325 mg total) by mouth once daily. 90 tablet 3    multivitamin capsule Take 3 capsules by mouth 3 (three) times daily.      nystatin (NYSTOP) powder Apply topically 3 (three) times daily. Apply to affected area 1 Bottle 3    omeprazole (PRILOSEC) 40 MG capsule Take 1 capsule (40 mg total) by mouth every morning. 180 capsule 0    ondansetron (ZOFRAN-ODT) 4 MG TbDL Take 2 tablets (8 mg total) by mouth every 8 (eight) hours as needed (nausea). 30 tablet 0    semaglutide, weight loss, (WEGOVY) 1.7 mg/0.75 mL PnIj Inject 1.7 mg into the skin every 7 days. 9 mL 0    traMADoL (ULTRAM) 50 mg tablet Take 1 tablet (50 mg total) by mouth every 6 (six) hours as needed for Pain. 30 tablet 0     [DISCONTINUED] nystatin-triamcinolone (MYCOLOG II) cream Apply topically 2 (two) times daily. (Patient not taking: Reported on 2024) 60 g 0     No current facility-administered medications on file prior to visit.     Social History     Socioeconomic History    Marital status:     Number of children: 2   Tobacco Use    Smoking status: Former     Current packs/day: 0.00     Types: Cigarettes     Start date: 1995     Quit date:      Years since quittin.7    Smokeless tobacco: Never   Substance and Sexual Activity    Alcohol use: Yes     Alcohol/week: 1.0 standard drink of alcohol     Types: 1 Glasses of wine per week     Comment: socially    Drug use: No    Sexual activity: Yes     Partners: Male     Birth control/protection: Other-see comments     Comment: Hysterectomy     Social Determinants of Health     Financial Resource Strain: Low Risk  (2024)    Overall Financial Resource Strain (CARDIA)     Difficulty of Paying Living Expenses: Not very hard   Food Insecurity: No Food Insecurity (2024)    Hunger Vital Sign     Worried About Running Out of Food in the Last Year: Never true     Ran Out of Food in the Last Year: Never true   Transportation Needs: No Transportation Needs (2024)    PRAPARE - Transportation     Lack of Transportation (Medical): No     Lack of Transportation (Non-Medical): No   Physical Activity: Insufficiently Active (2024)    Exercise Vital Sign     Days of Exercise per Week: 2 days     Minutes of Exercise per Session: 30 min   Stress: Stress Concern Present (2024)    Citizen of [place] of Occupational Health - Occupational Stress Questionnaire     Feeling of Stress : To some extent   Social Connections: Unknown (2024)    Social Connection and Isolation Panel [NHANES]     Frequency of Communication with Friends and Family: More than three times a week     Frequency of Social Gatherings with Friends and Family: Once a week     Active Member of Clubs or  Organizations: No     Attends Club or Organization Meetings: Never     Marital Status:    Housing Stability: High Risk (2/8/2024)    Housing Stability Vital Sign     Unable to Pay for Housing in the Last Year: Yes     Number of Places Lived in the Last Year: 1     Unstable Housing in the Last Year: No     Family History   Problem Relation Age of Onset    Aortic aneurysm Mother     Diabetes Mother     Cancer Father         lung cancer/quit tobacco earlier;    Cancer Brother         melanoma;    COPD Brother         emphysema; quit tobacco;    Drug abuse Brother     Aortic aneurysm Brother     Dementia Brother         hx of hypoxia s/p aspiration PNA    Hyperlipidemia Sister     Hypertension Sister     BAYLEE disease Sister     Rheum arthritis Sister     Aortic aneurysm Sister     Drug abuse Brother        Review of Systems   Constitutional:  Negative for chills, fever and malaise/fatigue.   HENT:  Negative for congestion, hearing loss and sore throat.    Eyes:  Negative for blurred vision, double vision and discharge.   Respiratory:  Negative for cough, shortness of breath and wheezing.    Cardiovascular:  Negative for chest pain, palpitations and leg swelling.   Gastrointestinal:  Positive for constipation and diarrhea. Negative for abdominal pain, blood in stool and vomiting.   Genitourinary:  Negative for dysuria, frequency and hematuria.   Musculoskeletal:  Negative for back pain, joint pain and neck pain.   Neurological:  Negative for weakness and headaches.   Endo/Heme/Allergies:  Negative for polydipsia.   Psychiatric/Behavioral:  Negative for depression. The patient is not nervous/anxious.            Objective:      Vitals:    04/04/24 0753   BP: 113/60   Pulse: 61   Temp: 98.4 °F (36.9 °C)     Body mass index is 50.3 kg/m².     Physical Exam  Constitutional:       General: She is not in acute distress.     Appearance: Normal appearance. She is well-developed. She is obese.   HENT:      Head:  Normocephalic and atraumatic.   Eyes:      Conjunctiva/sclera: Conjunctivae normal.   Cardiovascular:      Rate and Rhythm: Normal rate and regular rhythm.      Pulses: Normal pulses.      Heart sounds: Normal heart sounds. No murmur heard.  Pulmonary:      Effort: Pulmonary effort is normal. No respiratory distress.      Breath sounds: Normal breath sounds.   Abdominal:      General: Bowel sounds are normal. There is no distension.      Palpations: Abdomen is soft.      Tenderness: There is no abdominal tenderness.   Musculoskeletal:         General: Normal range of motion.      Cervical back: Normal range of motion and neck supple.   Skin:     General: Skin is warm and dry.      Findings: No rash.   Neurological:      General: No focal deficit present.      Mental Status: She is alert and oriented to person, place, and time.   Psychiatric:         Mood and Affect: Mood normal.         Behavior: Behavior normal.             All labs, imaging and procedures performed since last visit have been personally reviewed.    Answers submitted by the patient for this visit:  Review of Systems Questionnaire (Submitted on 4/4/2024)  activity change: No  unexpected weight change: No  rhinorrhea: No  trouble swallowing: No  visual disturbance: No  chest tightness: No  polyuria: No  difficulty urinating: No  menstrual problem: No  joint swelling: No  arthralgias: No  confusion: No  dysphoric mood: No

## 2024-04-04 NOTE — PROGRESS NOTES
Results have been released via [employer]. Please verify that these have been viewed by patient. If not, please call patient with results.      I have sent a message to them with the following interpretation (see below).    I have reviewed your recent results.    XR does show a moderate amount of stool but no obstructions/blockages. Increase fiber intake as discussed and make sure you are drinking plenty of water.    Please do not hesitate to call or message with any additional questions or concerns.    Terrie Hartmann MD

## 2024-04-07 ENCOUNTER — PATIENT MESSAGE (OUTPATIENT)
Dept: FAMILY MEDICINE | Facility: CLINIC | Age: 54
End: 2024-04-07
Payer: COMMERCIAL

## 2024-04-07 DIAGNOSIS — K92.1 HEMATOCHEZIA: Primary | ICD-10-CM

## 2024-04-08 ENCOUNTER — PATIENT MESSAGE (OUTPATIENT)
Dept: FAMILY MEDICINE | Facility: CLINIC | Age: 54
End: 2024-04-08
Payer: COMMERCIAL

## 2024-04-08 ENCOUNTER — TELEPHONE (OUTPATIENT)
Dept: GASTROENTEROLOGY | Facility: CLINIC | Age: 54
End: 2024-04-08
Payer: COMMERCIAL

## 2024-04-08 DIAGNOSIS — Z12.11 ENCOUNTER FOR SCREENING COLONOSCOPY: Primary | ICD-10-CM

## 2024-04-08 DIAGNOSIS — K92.1 HEMATOCHEZIA: ICD-10-CM

## 2024-04-08 NOTE — TELEPHONE ENCOUNTER
----- Message from Aline Mobley LPN sent at 4/8/2024 11:22 AM CDT -----  Regarding: FW: colonoscopy    ----- Message -----  From: Omar Mobley  Sent: 4/8/2024  10:56 AM CDT  To: [facility] Gastro Clinical Staff  Subject: colonoscopy                                      Patient has orders for colonoscopy and asking for [facility] [place], please reach out to patient for procedure      Thanks

## 2024-04-08 NOTE — TELEPHONE ENCOUNTER
I have signed for the following orders AND/OR meds.  Please call the patient and ask the patient to schedule the testing AND/OR inform about any medications that were sent. Medications have been sent to pharmacy listed below      Orders Placed This Encounter   Procedures    Case Request Endoscopy: COLONOSCOPY     Order Specific Question:   Medical Necessity:     Answer:   Medically Non-Urgent [100]     Order Specific Question:   CPT Code:     Answer:   KS COLONOSCOPY,DIAGNOSTIC [25620]     Order Specific Question:   Case Referring Provider     Answer:   NOELLE DIXON [[id number]]     Order Specific Question:   Is an on-site pathologist required for this procedure?     Answer:   N/A              [facility]/pharmacy #[id number] - [place] - [address]  [address] [address]  Phone: [phone] Fax: [phone]    [facility]/pharmacy #[id number] - [place] - [address]  [address]  [facility] [address]  Phone: [phone] Fax: [phone]    [facility] [place] - [address] [facility] AT [address] [facility]  [place] [address]  Phone: [phone] Fax: [phone]

## 2024-04-08 NOTE — TELEPHONE ENCOUNTER
Pt had a colonoscopy in 2020 and will be due for another in 2025. Can we get a Dx colonoscopy instead of a routine screening? Pt stated she had blood in her stool

## 2024-04-08 NOTE — TELEPHONE ENCOUNTER
Thankfully recent labs do not show any evidence of anemia and results thus far are normal but I do recommend that she have an updated colonoscopy for the bleeding. I have placed orders.    I have signed for the following orders AND/OR meds.  Please call the patient and ask the patient to schedule the testing AND/OR inform about any medications that were sent. Medications have been sent to pharmacy listed below      Orders Placed This Encounter   Procedures    Ambulatory referral/consult to Endo Procedure      Standing Status:   Future     Standing Expiration Date:   10/31/2024     Referral Priority:   Routine     Referral Type:   Consultation     Number of Visits Requested:   1              [facility]/pharmacy #[id number] - [place] - [address]  [place] [address]  Phone: [phone] Fax: [phone]    [facility]/pharmacy #[id number] - [place] - [address]  [address]  [place] [address]  Phone: [phone] Fax: [phone]    [facility] [place] - [facility] AT [address] to [facility] [facility]  [place] [address]  Phone: [phone] Fax: [phone]

## 2024-04-08 NOTE — TELEPHONE ENCOUNTER
Ms. Rodriguez is having blood in her stools. Would like a case request for the colonoscopy instead of scheduling an office visit.

## 2024-04-08 NOTE — TELEPHONE ENCOUNTER
----- Message from Aline Mobley LPN sent at 4/8/2024 11:26 AM CDT -----  Regarding: FW: colonoscopy    ----- Message -----  From: Omar Mobley  Sent: 4/8/2024  10:56 AM CDT  To: [facility] Gastro Clinical Staff  Subject: colonoscopy                                      Patient has orders for colonoscopy and asking for [facility], please reach out to patient for procedure      Thanks

## 2024-04-11 ENCOUNTER — ANESTHESIA (OUTPATIENT)
Dept: ENDOSCOPY | Facility: HOSPITAL | Age: 54
End: 2024-04-11
Payer: COMMERCIAL

## 2024-04-11 ENCOUNTER — ANESTHESIA EVENT (OUTPATIENT)
Dept: ENDOSCOPY | Facility: HOSPITAL | Age: 54
End: 2024-04-11
Payer: COMMERCIAL

## 2024-04-11 ENCOUNTER — HOSPITAL ENCOUNTER (OUTPATIENT)
Facility: HOSPITAL | Age: 54
Discharge: HOME | End: 2024-04-11
Attending: INTERNAL MEDICINE | Admitting: INTERNAL MEDICINE
Payer: COMMERCIAL

## 2024-04-11 DIAGNOSIS — K62.5 RECTAL BLEED: ICD-10-CM

## 2024-04-11 PROCEDURE — 63600175 PHARM REV CODE 636 W HCPCS: Mod: PO | Performed by: NURSE ANESTHETIST, CERTIFIED REGISTERED

## 2024-04-11 PROCEDURE — 37000008 HC ANESTHESIA 1ST 15 MINUTES: Mod: PO | Performed by: INTERNAL MEDICINE

## 2024-04-11 PROCEDURE — 45385 COLONOSCOPY W/LESION REMOVAL: CPT | Mod: PO | Performed by: INTERNAL MEDICINE

## 2024-04-11 PROCEDURE — D9220A PRA ANESTHESIA: Mod: ANES,,, | Performed by: ANESTHESIOLOGY

## 2024-04-11 PROCEDURE — 25000003 PHARM REV CODE 250: Mod: PO | Performed by: NURSE ANESTHETIST, CERTIFIED REGISTERED

## 2024-04-11 PROCEDURE — 88305 TISSUE EXAM BY PATHOLOGIST: CPT | Mod: 26,,, | Performed by: STUDENT IN AN ORGANIZED HEALTH CARE EDUCATION/TRAINING PROGRAM

## 2024-04-11 PROCEDURE — 45385 COLONOSCOPY W/LESION REMOVAL: CPT | Mod: ,,, | Performed by: INTERNAL MEDICINE

## 2024-04-11 PROCEDURE — D9220A PRA ANESTHESIA: Mod: CRNA,,, | Performed by: NURSE ANESTHETIST, CERTIFIED REGISTERED

## 2024-04-11 PROCEDURE — 63600175 PHARM REV CODE 636 W HCPCS: Mod: PO | Performed by: INTERNAL MEDICINE

## 2024-04-11 PROCEDURE — 27201089 HC SNARE, DISP (ANY): Mod: PO | Performed by: INTERNAL MEDICINE

## 2024-04-11 PROCEDURE — 37000009 HC ANESTHESIA EA ADD 15 MINS: Mod: PO | Performed by: INTERNAL MEDICINE

## 2024-04-11 PROCEDURE — 88305 TISSUE EXAM BY PATHOLOGIST: CPT | Mod: PO | Performed by: STUDENT IN AN ORGANIZED HEALTH CARE EDUCATION/TRAINING PROGRAM

## 2024-04-11 RX ORDER — SODIUM CHLORIDE, SODIUM LACTATE, POTASSIUM CHLORIDE, CALCIUM CHLORIDE 600; 310; 30; 20 MG/100ML; MG/100ML; MG/100ML; MG/100ML
INJECTION, SOLUTION INTRAVENOUS CONTINUOUS
Status: DISCONTINUED | OUTPATIENT
Start: 2024-04-11 | End: 2024-04-11 | Stop reason: HOSPADM

## 2024-04-11 RX ORDER — LIDOCAINE HYDROCHLORIDE 20 MG/ML
INJECTION INTRAVENOUS
Status: DISCONTINUED | OUTPATIENT
Start: 2024-04-11 | End: 2024-04-11

## 2024-04-11 RX ORDER — PROPOFOL 10 MG/ML
VIAL (ML) INTRAVENOUS
Status: DISCONTINUED | OUTPATIENT
Start: 2024-04-11 | End: 2024-04-11

## 2024-04-11 RX ORDER — SODIUM CHLORIDE 0.9 % (FLUSH) 0.9 %
10 SYRINGE (ML) INJECTION
Status: DISCONTINUED | OUTPATIENT
Start: 2024-04-11 | End: 2024-04-11 | Stop reason: HOSPADM

## 2024-04-11 RX ADMIN — LIDOCAINE HYDROCHLORIDE 75 MG: 20 INJECTION INTRAVENOUS at 01:04

## 2024-04-11 RX ADMIN — PROPOFOL 40 MG: 10 INJECTION, EMULSION INTRAVENOUS at 02:04

## 2024-04-11 RX ADMIN — PROPOFOL 120 MG: 10 INJECTION, EMULSION INTRAVENOUS at 01:04

## 2024-04-11 RX ADMIN — SODIUM CHLORIDE, POTASSIUM CHLORIDE, SODIUM LACTATE AND CALCIUM CHLORIDE: 600; 310; 30; 20 INJECTION, SOLUTION INTRAVENOUS at 01:04

## 2024-04-11 NOTE — TRANSFER OF CARE
"Anesthesia Transfer of Care Note    Patient: Felicia Drew    Procedure(s) Performed: Procedure(s) (LRB):  COLONOSCOPY (N/A)    Patient location: PACU    Anesthesia Type: general    Transport from OR: Transported from OR on room air with adequate spontaneous ventilation    Post pain: adequate analgesia    Post assessment: no apparent anesthetic complications    Post vital signs: stable    Level of consciousness: awake and sedated    Nausea/Vomiting: no nausea/vomiting    Complications: none    Transfer of care protocol was followed      Last vitals: Visit Vitals  BP (!) 110/57   Pulse 75   Temp 36.7 °C (98 °F)   Resp 16   Ht 5' 2" (1.575 m)   Wt 122.5 kg (270 lb)   SpO2 96%   Breastfeeding No   BMI 49.38 kg/m²     "

## 2024-04-11 NOTE — H&P
History & Physical - Short Stay  Gastroenterology      SUBJECTIVE:     Procedure: Colonoscopy    Chief Complaint/Indication for Procedure: Previous Polyps and Rectal Bleeding    PTA Medications   Medication Sig    acetaminophen (TYLENOL) 500 MG tablet Take 1 tablet (500 mg total) by mouth every 6 (six) hours as needed for Pain.    cholecalciferol, vitamin D3, 1,250 mcg (50,000 unit) Tab Take 1,250 mcg by mouth every 7 days.    cyanocobalamin, vitamin B-12, 2,500 mcg Subl Place 1 tablet under the tongue once a week.    diclofenac sodium (VOLTAREN) 1 % Gel APPLY TO AFFECTED AREA 2 GRAMS ONCE DAILY    docusate sodium (COLACE) 100 MG capsule Take 1 capsule (100 mg total) by mouth 2 (two) times daily.    ferrous sulfate 325 (65 FE) MG EC tablet Take 1 tablet (325 mg total) by mouth once daily.    multivitamin capsule Take 3 capsules by mouth 3 (three) times daily.    nystatin (NYSTOP) powder Apply topically 3 (three) times daily. Apply to affected area    omeprazole (PRILOSEC) 40 MG capsule Take 1 capsule (40 mg total) by mouth every morning.    ondansetron (ZOFRAN-ODT) 4 MG TbDL Take 2 tablets (8 mg total) by mouth every 8 (eight) hours as needed (nausea).    semaglutide, weight loss, (WEGOVY) 1.7 mg/0.75 mL PnIj Inject 1.7 mg into the skin every 7 days.    traMADoL (ULTRAM) 50 mg tablet Take 1 tablet (50 mg total) by mouth every 6 (six) hours as needed for Pain.    triamcinolone acetonide 0.1% (KENALOG) 0.1 % cream Apply topically 2 (two) times daily.       Review of patient's allergies indicates:   Allergen Reactions    Brompheniramin-phenylephrin-dm      Other reaction(s): Other (See Comments)  Per patient, caused dizziness + blurry vision     Erythromycin Rash    Erythromycin (bulk) Rash        Past Medical History:   Diagnosis Date    H/O hysterectomy for benign disease with BSO 2017    Morbid obesity     Varicose veins     Vitamin D deficiency      Past Surgical History:   Procedure Laterality Date      SECTION      CHOLECYSTECTOMY      COLONOSCOPY N/A 2020    Procedure: COLONOSCOPY;  Surgeon: Sean Ward MD;  Location: [facility] (2ND FLR);  Service: Endoscopy;  Laterality: N/A;  screening colonooscpy, 49 years old.  siblings with colon polyps   - pt updated on visitor policy and drop off location.  COVID screening ordered for  at [facility] [place] - [place]    ESOPHAGOGASTRODUODENOSCOPY N/A 2020    Procedure: ESOPHAGOGASTRODUODENOSCOPY (EGD);  Surgeon: Sean Ward MD;  Location: [facility] ENDO (2ND FLR);  Service: Endoscopy;  Laterality: N/A;  BMI 51.77  Re-scheduled per md recommendations-new instructions mailed-tb    GASTRIC BYPASS      ZACK FILTER PLACEMENT      zack filter removal      HYSTERECTOMY      DANNA with BSO;    lap band removal       LAPAROSCOPIC GASTRIC BANDING      OOPHORECTOMY      w/hysteretomy @ age 41    TONSILLECTOMY      VASCULAR SURGERY       Family History   Problem Relation Age of Onset    Aortic aneurysm Mother     Diabetes Mother     Cancer Father         lung cancer/quit tobacco earlier;    Cancer Brother         melanoma;    COPD Brother         emphysema; quit tobacco;    Drug abuse Brother     Aortic aneurysm Brother     Dementia Brother         hx of hypoxia s/p aspiration PNA    Hyperlipidemia Sister     Hypertension Sister     BAYLEE disease Sister     Rheum arthritis Sister     Aortic aneurysm Sister     Drug abuse Brother      Social History     Tobacco Use    Smoking status: Former     Current packs/day: 0.00     Types: Cigarettes     Start date: 1995     Quit date:      Years since quittin.7    Smokeless tobacco: Never   Substance Use Topics    Alcohol use: Yes     Alcohol/week: 1.0 standard drink of alcohol     Types: 1 Glasses of wine per week     Comment: socially    Drug use: No         OBJECTIVE:     Vital Signs (Most Recent)  Temp: 98 °F (36.7 °C) (24 131)  Pulse: 75 (24 131)  Resp: 16 (24 131)  BP: (!)  110/57 (04/11/24 1315)  SpO2: 96 % (04/11/24 1315)    Physical Exam:                                                       GENERAL:  Comfortable, in no acute distress.                                 HEENT EXAM:  Nonicteric.  No adenopathy.  Oropharynx is clear.               NECK:  Supple.                                                               LUNGS:  Clear.                                                               CARDIAC:  Regular rate and rhythm.  S1, S2.  No murmur.                      ABDOMEN:  Soft, positive bowel sounds, nontender.  No hepatosplenomegaly or masses.  No rebound or guarding.                                             EXTREMITIES:  No edema.     MENTAL STATUS:  Normal, alert and oriented.      ASSESSMENT/PLAN:     Assessment: Previous Polyps and Rectal Bleeding    Plan: Colonoscopy    Anesthesia Plan: General    ASA Grade: ASA 2 - Patient with mild systemic disease with no functional limitations    MALLAMPATI SCORE:  I (soft palate, uvula, fauces, and tonsillar pillars visible)

## 2024-04-11 NOTE — ANESTHESIA PREPROCEDURE EVALUATION
04/11/2024  Felicia Drew is a 53 y.o., female.    Anesthesia Evaluation    I have reviewed the NPO Status.   I have reviewed the Medications.     Review of Systems  Anesthesia Hx:  No problems with previous Anesthesia                Social:  Former Smoker, Alcohol Use       Hematology/Oncology:                   Hematology Comments: History of DVT                     Cardiovascular:  Exercise tolerance: good                  Venous insufficiency                         Hepatic/GI:  Bowel Prep.   GERD   S/p Gastric bypass,  Family history of polyps in the colon    Gerd          Endocrine:        Morbid Obesity / BMI > 40  Psych:  Psychiatric History                  Physical Exam  General:  Morbid Obesity       Airway/Jaw/Neck:  Airway Findings: Mouth Opening: Normal     Tongue: Normal      General Airway Assessment: Adult                           Mental Status:  Mental Status Findings:  Alert and Oriented, Cooperative         Anesthesia Plan  Type of Anesthesia, risks & benefits discussed:  Anesthesia Type:  general    Patient's Preference:   Plan Factors:          Intra-op Monitoring Plan: standard ASA monitors  Intra-op Monitoring Plan Comments:   Post Op Pain Control Plan:   Post Op Pain Control Plan Comments:     Induction:   IV  Beta Blocker:  Patient is not currently on a Beta-Blocker (No further documentation required).       Informed Consent: Informed consent signed with the Patient and all parties understand the risks and agree with anesthesia plan.  All questions answered.  Anesthesia consent signed with patient.  ASA Score: 3     Day of Surgery Review of History & Physical:              Ready For Surgery From Anesthesia Perspective.             Physical Exam  General: Morbid Obesity    Airway:  Mouth Opening: Normal  Tongue: Normal        Anesthesia Plan  Type of Anesthesia, risks &  benefits discussed:    Anesthesia Type: general  Intra-op Monitoring Plan: standard ASA monitors  Induction:  IV  Informed Consent: Informed consent signed with the Patient and all parties understand the risks and agree with anesthesia plan.  All questions answered.   ASA Score: 3    Ready For Surgery From Anesthesia Perspective.     .

## 2024-04-11 NOTE — DISCHARGE SUMMARY
[facility] - Endoscopy  Discharge Note  Short Stay  Discharge Note  Short Stay      SUMMARY     Admit Date: 4/11/2024    Attending Physician: Jhoan Mcdonald MD     Discharge Physician: Jhoan Mcdonald MD    Discharge Date: 4/11/2024 2:28 PM    Final Diagnosis: Hematochezia    Disposition: HOME OR SELF CARE    Patient Instructions:   Current Discharge Medication List        CONTINUE these medications which have NOT CHANGED    Details   acetaminophen (TYLENOL) 500 MG tablet Take 1 tablet (500 mg total) by mouth every 6 (six) hours as needed for Pain.  Refills: 0      cholecalciferol, vitamin D3, 1,250 mcg (50,000 unit) Tab Take 1,250 mcg by mouth every 7 days.  Qty: 12 tablet, Refills: 3    Associated Diagnoses: Vitamin D deficiency      cyanocobalamin, vitamin B-12, 2,500 mcg Subl Place 1 tablet under the tongue once a week.      diclofenac sodium (VOLTAREN) 1 % Gel APPLY TO AFFECTED AREA 2 GRAMS ONCE DAILY  Qty: 100 g, Refills: 1    Associated Diagnoses: Arthritis      docusate sodium (COLACE) 100 MG capsule Take 1 capsule (100 mg total) by mouth 2 (two) times daily.  Refills: 0    Associated Diagnoses: Slow transit constipation      ferrous sulfate 325 (65 FE) MG EC tablet Take 1 tablet (325 mg total) by mouth once daily.  Qty: 90 tablet, Refills: 3    Associated Diagnoses: Iron deficiency      multivitamin capsule Take 3 capsules by mouth 3 (three) times daily.      nystatin (NYSTOP) powder Apply topically 3 (three) times daily. Apply to affected area  Qty: 1 Bottle, Refills: 3    Associated Diagnoses: Rash      omeprazole (PRILOSEC) 40 MG capsule Take 1 capsule (40 mg total) by mouth every morning.  Qty: 180 capsule, Refills: 0    Associated Diagnoses: History of Audra-en-Y gastric bypass; Vomiting (bilious) following gastrointestinal surgery      ondansetron (ZOFRAN-ODT) 4 MG TbDL Take 2 tablets (8 mg total) by mouth every 8 (eight) hours as needed (nausea).  Qty: 30 tablet, Refills: 0      semaglutide,  weight loss, (WEGOVY) 1.7 mg/0.75 mL PnIj Inject 1.7 mg into the skin every 7 days.  Qty: 9 mL, Refills: 0    Associated Diagnoses: Class 3 severe obesity due to excess calories with serious comorbidity and body mass index (BMI) of 50.0 to 59.9 in adult; S/P bariatric surgery      traMADoL (ULTRAM) 50 mg tablet Take 1 tablet (50 mg total) by mouth every 6 (six) hours as needed for Pain.  Qty: 30 tablet, Refills: 0    Comments: Quantity prescribed more than 7 day supply? Yes, quantity medically necessary      triamcinolone acetonide 0.1% (KENALOG) 0.1 % cream Apply topically 2 (two) times daily.  Qty: 30 g, Refills: 0             Discharge Procedure Orders (must include Diet, Follow-up, Activity)    Follow Up:  Follow up with PCP as previously scheduled  Resume routine diet.  Activity as tolerated.    No driving day of procedure.

## 2024-04-11 NOTE — [employer] PATIENT INSTRUCTIONS
Discharge Summary/Instructions after an Endoscopic Procedure  Patient Name: Felicia Drew  Patient MRN: [id number]  Patient YOB: 1970 Thursday, April 11, 2024  Jhoan Mcdonald MD  Dear patient,  As a result of recent federal legislation (The Federal Cures Act), you may   receive lab or pathology results from your procedure in your [facility]   account before your physician is able to contact you. Your physician or   their representative will relay the results to you with their   recommendations at their soonest availability.  Thank you,  RESTRICTIONS:  During your procedure today, you received medications for sedation.  These   medications may affect your judgment, balance and coordination.  Therefore,   for 24 hours, you have the following restrictions:   - DO NOT drive a car, operate machinery, make legal/financial decisions,   sign important papers or drink alcohol.    ACTIVITY:  Today: no heavy lifting, straining or running due to procedural   sedation/anesthesia.  The following day: return to full activity including work.  DIET:  Eat and drink normally unless instructed otherwise.     TREATMENT FOR COMMON SIDE EFFECTS:  - Mild abdominal pain, nausea, belching, bloating or excessive gas:  rest,   eat lightly and use a heating pad.  - Sore Throat: treat with throat lozenges and/or gargle with warm salt   water.  - Because air was used during the procedure, expelling large amounts of air   from your rectum or belching is normal.  - If a bowel prep was taken, you may not have a bowel movement for 1-3 days.    This is normal.  SYMPTOMS TO WATCH FOR AND REPORT TO YOUR PHYSICIAN:  1. Abdominal pain or bloating, other than gas cramps.  2. Chest pain.  3. Back pain.  4. Signs of infection such as: chills or fever occurring within 24 hours   after the procedure.  5. Rectal bleeding, which would show as bright red, maroon, or black stools.   (A tablespoon of blood from the rectum is not serious, especially  if   hemorrhoids are present.)  6. Vomiting.  7. Weakness or dizziness.  GO DIRECTLY TO THE NEAREST EMERGENCY ROOM IF YOU HAVE ANY OF THE FOLLOWING:      Difficulty breathing              Chills and/or fever over 101 F   Persistent vomiting and/or vomiting blood   Severe abdominal pain   Severe chest pain   Black, tarry stools   Bleeding- more than one tablespoon   Any other symptom or condition that you feel may need urgent attention  Your doctor recommends these additional instructions:  If any biopsies were taken, your doctors clinic will contact you in 1 to 2   weeks with any results.  We are waiting for your pathology results.   Your physician has recommended a repeat colonoscopy in five years for   surveillance based on pathology results.   You are being discharged to home.  For questions, problems or results please call your physician - Jhoan Mcdonald MD at Work:  [phone].  EMERGENCY PHONE NUMBER: [phone], LAB RESULTS: [phone]  IF A COMPLICATION OR EMERGENCY SITUATION ARISES AND YOU ARE UNABLE TO REACH   YOUR PHYSICIAN - GO DIRECTLY TO THE EMERGENCY ROOM.  ___________________________________________  Nurse Signature  ___________________________________________  Patient/Designated Responsible Party Signature  Jhoan Mcdonald MD  4/11/2024 2:27:11 PM  This report has been verified and signed electronically.  Dear patient,  As a result of recent federal legislation (The Federal Cures Act), you may   receive lab or pathology results from your procedure in your [facility]   account before your physician is able to contact you. Your physician or   their representative will relay the results to you with their   recommendations at their soonest availability.  Thank you.  PROVATION

## 2024-04-12 VITALS
WEIGHT: 270 LBS | RESPIRATION RATE: 16 BRPM | SYSTOLIC BLOOD PRESSURE: 120 MMHG | HEIGHT: 62 IN | OXYGEN SATURATION: 98 % | TEMPERATURE: 98 F | DIASTOLIC BLOOD PRESSURE: 74 MMHG | BODY MASS INDEX: 49.69 KG/M2 | HEART RATE: 74 BPM

## 2024-04-12 NOTE — ANESTHESIA POSTPROCEDURE EVALUATION
Anesthesia Post Evaluation    Patient: Felicia Drew    Procedure(s) Performed: Procedure(s) (LRB):  COLONOSCOPY (N/A)    Final Anesthesia Type: general      Patient location during evaluation: PACU  Patient participation: Yes- Able to Participate  Level of consciousness: awake and alert and oriented  Post-procedure vital signs: reviewed and stable  Pain management: adequate  Airway patency: patent    PONV status at discharge: No PONV  Anesthetic complications: no      Cardiovascular status: blood pressure returned to baseline  Respiratory status: unassisted, spontaneous ventilation and room air  Hydration status: euvolemic  Follow-up not needed.              Vitals Value Taken Time   /74 04/11/24 1442   Temp 36.4 °C (97.5 °F) 04/11/24 1422   Pulse 74 04/11/24 1442   Resp 16 04/11/24 1442   SpO2 98 % 04/11/24 1442         Event Time   Out of Recovery 14:51:56         Pain/Pura Score: Pura Score: 10 (4/11/2024  2:50 PM)

## 2024-04-16 LAB
FINAL PATHOLOGIC DIAGNOSIS: NORMAL
GROSS: NORMAL
Lab: NORMAL

## 2024-05-06 DIAGNOSIS — E55.9 VITAMIN D DEFICIENCY: ICD-10-CM

## 2024-05-06 RX ORDER — ASPIRIN 325 MG
50000 TABLET, DELAYED RELEASE (ENTERIC COATED) ORAL
Qty: 12 CAPSULE | Refills: 3 | Status: SHIPPED | OUTPATIENT
Start: 2024-05-06

## 2024-05-06 NOTE — TELEPHONE ENCOUNTER
No care due was identified.  [facility] Embedded Care Due Messages. Reference number: [id number].   5/06/2024 12:11:54 AM CDT

## 2024-05-08 ENCOUNTER — OFFICE VISIT (OUTPATIENT)
Dept: BARIATRICS | Facility: CLINIC | Age: 54
End: 2024-05-08
Payer: COMMERCIAL

## 2024-05-08 VITALS
DIASTOLIC BLOOD PRESSURE: 76 MMHG | OXYGEN SATURATION: 95 % | HEART RATE: 70 BPM | SYSTOLIC BLOOD PRESSURE: 139 MMHG | BODY MASS INDEX: 49.07 KG/M2 | WEIGHT: 268.31 LBS

## 2024-05-08 DIAGNOSIS — Z98.84 S/P BARIATRIC SURGERY: ICD-10-CM

## 2024-05-08 DIAGNOSIS — Z71.3 ENCOUNTER FOR WEIGHT LOSS COUNSELING: ICD-10-CM

## 2024-05-08 DIAGNOSIS — E66.01 CLASS 3 SEVERE OBESITY DUE TO EXCESS CALORIES WITH SERIOUS COMORBIDITY AND BODY MASS INDEX (BMI) OF 45.0 TO 49.9 IN ADULT: Primary | ICD-10-CM

## 2024-05-08 PROCEDURE — 99213 OFFICE O/P EST LOW 20 MIN: CPT | Mod: S$GLB,,, | Performed by: STUDENT IN AN ORGANIZED HEALTH CARE EDUCATION/TRAINING PROGRAM

## 2024-05-08 PROCEDURE — 99999 PR PBB SHADOW E&M-EST. PATIENT-LVL III: CPT | Mod: PBBFAC,,, | Performed by: STUDENT IN AN ORGANIZED HEALTH CARE EDUCATION/TRAINING PROGRAM

## 2024-05-08 RX ORDER — SEMAGLUTIDE 1.7 MG/.75ML
1.7 INJECTION, SOLUTION SUBCUTANEOUS
Qty: 9 ML | Refills: 0 | Status: SHIPPED | OUTPATIENT
Start: 2024-05-08

## 2024-05-08 NOTE — PROGRESS NOTES
Subjective:       Patient ID: Felicia Drew is a 53 y.o. female.    Chief Complaint: Obesity, Follow-up, and Weight Check    Patient presents for treatment of obesity.     Lap Band in 2011, band removal and RNY in 2015  220 lbs after surgery  Gained 48 lbs in past year  Emotional eater, building house so living with inlaws    No glaucoma  No kidney stones  No pancreatitis  No thyroid cancer    No prior use of weight loss medication    Physical Activity  No current exercise routine    Current Eating Habits  Premier protein shakes, especially when on the road  Nut and cheese packs  About 32 oz of water daily      Medical Weight Loss History  3/17/2021: 283.4, BMI 51.8, BFP 54.9%, SMM 68.6 lbs; topiramate 25 mg twice daily - would often forget second pill  5/10/2021: 279.4 lbs, BMI 51.1, BFP 55%, SMM 67.9 lbs; topiramate 50 mg daily  6/15/2021: 277.7 lbs, BMI 50.8, BFP 55.4%, SMM 67.2 lbs, BMR 1585 kcal; phentermine  10/4/2023: 299 lbs, BMI 54.7, BFP 55.9%,  lbs, SMM 71.7 lbs, BMR 1663 kcal  2/8/2024: 278.4 lbs, BMI 50.9, BFP 55.2%, .6 lbs, SMM 67.5 lbs, BMR 1592 kcal  5/8/2024: 268.3 lbs, BMI 49.1, BFP 54.6%, .6 lbs, SMM 65.5 lbs, BMR 1563 kcal          Review of Systems   Constitutional:  Negative for chills and fever.   Respiratory:  Positive for shortness of breath (with exertion).    Cardiovascular:  Positive for leg swelling. Negative for chest pain and palpitations.   Gastrointestinal:  Negative for abdominal pain, nausea and vomiting.   Neurological:  Negative for dizziness and light-headedness.   Psychiatric/Behavioral:  The patient is not nervous/anxious.          Objective:        Latest Reference Range & Units 05/22/23 08:56   WBC 3.90 - 12.70 K/uL 5.96   RBC 4.00 - 5.40 M/uL 4.57   Hemoglobin 12.0 - 16.0 g/dL 12.9   Hematocrit 37.0 - 48.5 % 40.9   MCV 82 - 98 fL 90   MCH 27.0 - 31.0 pg 28.2   MCHC 32.0 - 36.0 g/dL 31.5 (L)   RDW 11.5 - 14.5 % 13.2   Platelet Count 150 - 450  K/uL 227   MPV 9.2 - 12.9 fL 11.9   Iron 30 - 160 ug/dL 92   TIBC 250 - 450 ug/dL 485 (H)   Saturated Iron 20 - 50 % 19 (L)   Transferrin 200 - 375 mg/dL 328   Ferritin 20.0 - 300.0 ng/mL 22   Folate 4.0 - 24.0 ng/mL 10.9   Vitamin B-12 210 - 950 pg/mL 304   Methlymalonic Acid <0.40 umol/L 0.37   Sodium 136 - 145 mmol/L 142   Potassium 3.5 - 5.1 mmol/L 3.8   Chloride 95 - 110 mmol/L 106   CO2 23 - 29 mmol/L 25   Anion Gap 8 - 16 mmol/L 11   BUN 6 - 20 mg/dL 13   Creatinine 0.5 - 1.4 mg/dL 0.7   eGFR >60 mL/min/1.73 m^2 >60.0   Glucose 70 - 110 mg/dL 80   Calcium 8.7 - 10.5 mg/dL 9.4   ALP 55 - 135 U/L 87   PROTEIN TOTAL 6.0 - 8.4 g/dL 6.7   Albumin 3.5 - 5.2 g/dL 3.7   BILIRUBIN TOTAL 0.1 - 1.0 mg/dL 0.6   AST 10 - 40 U/L 16   ALT 10 - 44 U/L 17   Cholesterol Total 120 - 199 mg/dL 159   HDL 40 - 75 mg/dL 60   HDL/Cholesterol Ratio 20.0 - 50.0 % 37.7   LDL Cholesterol External 63.0 - 159.0 mg/dL 88.8   Non-HDL Cholesterol mg/dL 99   Total Cholesterol/HDL Ratio 2.0 - 5.0  2.7   Triglycerides 30 - 150 mg/dL 51   Copper 810 - 1990 ug/L 1355   Zinc, Serum-ALT 60 - 130 ug/dL 92   Thiamine 38 - 122 ug/L 63   Vit D, 25-Hydroxy 30 - 96 ng/mL 14 (L)   TSH 0.400 - 4.000 uIU/mL 1.159   PTH 9.0 - 77.0 pg/mL 101.1 (H)   (L): Data is abnormally low  (H): Data is abnormally high      Vitals:    05/08/24 0901   BP: 139/76   Pulse: 70         Physical Exam  Vitals reviewed.   Constitutional:       General: She is not in acute distress.     Appearance: Normal appearance. She is obese. She is not ill-appearing, toxic-appearing or diaphoretic.   HENT:      Head: Normocephalic and atraumatic.   Cardiovascular:      Rate and Rhythm: Normal rate.   Pulmonary:      Effort: Pulmonary effort is normal. No respiratory distress.   Skin:     General: Skin is warm and dry.   Neurological:      Mental Status: She is alert and oriented to person, place, and time.         Assessment:       1. Class 3 severe obesity due to excess calories with  serious comorbidity and body mass index (BMI) of 45.0 to 49.9 in adult    2. S/P bariatric surgery    3. Encounter for weight loss counseling        Plan:   - Wegovy 1.7 mg weekly  injections    - Log all food and beverage intake with a daily calorie goal of 800-1000 calories per day; high protein, low fat, low carbohydrate    - Moderate intensity aerobic exercise for 30  Minutes 3-4x/week

## 2024-06-14 ENCOUNTER — PATIENT MESSAGE (OUTPATIENT)
Dept: FAMILY MEDICINE | Facility: CLINIC | Age: 54
End: 2024-06-14
Payer: COMMERCIAL

## 2024-06-15 NOTE — TELEPHONE ENCOUNTER
Refill Routing Note   Medication(s) are not appropriate for processing by [facility] for the following reason(s):        New or recently adjusted medication    [facility] action(s):  Defer               Appointments  past 12m or future 3m with PCP    Date Provider   Last Visit   4/4/2024 Terrie Hartmann MD   Next Visit   8/1/2024 Terrie Hartmann MD   ED visits in past 90 days: 0        Note composed:3:40 PM 06/15/2024

## 2024-06-15 NOTE — TELEPHONE ENCOUNTER
No care due was identified.  [facility] Embedded Care Due Messages. Reference number: [id number].   6/15/2024 5:07:05 AM CDT

## 2024-06-17 RX ORDER — TRIAMCINOLONE ACETONIDE 1 MG/G
CREAM TOPICAL 2 TIMES DAILY
Qty: 30 G | Refills: 0 | Status: SHIPPED | OUTPATIENT
Start: 2024-06-17

## 2024-10-07 ENCOUNTER — OFFICE VISIT (OUTPATIENT)
Dept: FAMILY MEDICINE | Facility: CLINIC | Age: 54
End: 2024-10-07
Payer: COMMERCIAL

## 2024-10-07 ENCOUNTER — HOSPITAL ENCOUNTER (OUTPATIENT)
Dept: RADIOLOGY | Facility: HOSPITAL | Age: 54
Discharge: HOME | End: 2024-10-07
Attending: INTERNAL MEDICINE
Payer: COMMERCIAL

## 2024-10-07 VITALS
DIASTOLIC BLOOD PRESSURE: 74 MMHG | OXYGEN SATURATION: 98 % | HEART RATE: 81 BPM | WEIGHT: 261.63 LBS | SYSTOLIC BLOOD PRESSURE: 124 MMHG | BODY MASS INDEX: 48.14 KG/M2 | HEIGHT: 62 IN

## 2024-10-07 DIAGNOSIS — M54.50 CHRONIC LEFT-SIDED LOW BACK PAIN WITHOUT SCIATICA: ICD-10-CM

## 2024-10-07 DIAGNOSIS — Z82.49 FAMILY HISTORY OF ABDOMINAL AORTIC ANEURYSM (AAA): ICD-10-CM

## 2024-10-07 DIAGNOSIS — Z98.84 HISTORY OF ROUX-EN-Y GASTRIC BYPASS: ICD-10-CM

## 2024-10-07 DIAGNOSIS — Z80.9 FAMILY HISTORY OF CANCER: ICD-10-CM

## 2024-10-07 DIAGNOSIS — G89.29 CHRONIC LEFT-SIDED LOW BACK PAIN WITHOUT SCIATICA: ICD-10-CM

## 2024-10-07 DIAGNOSIS — E61.1 IRON DEFICIENCY: ICD-10-CM

## 2024-10-07 DIAGNOSIS — E53.8 VITAMIN B12 DEFICIENCY: ICD-10-CM

## 2024-10-07 DIAGNOSIS — Z12.31 ENCOUNTER FOR SCREENING MAMMOGRAM FOR BREAST CANCER: ICD-10-CM

## 2024-10-07 DIAGNOSIS — Z79.899 ENCOUNTER FOR LONG-TERM (CURRENT) USE OF MEDICATIONS: Primary | ICD-10-CM

## 2024-10-07 DIAGNOSIS — K91.0 VOMITING (BILIOUS) FOLLOWING GASTROINTESTINAL SURGERY: ICD-10-CM

## 2024-10-07 DIAGNOSIS — M51.360 DEGENERATION OF INTERVERTEBRAL DISC OF LUMBAR REGION WITH DISCOGENIC BACK PAIN: ICD-10-CM

## 2024-10-07 DIAGNOSIS — E55.9 VITAMIN D DEFICIENCY: ICD-10-CM

## 2024-10-07 PROCEDURE — 72100 X-RAY EXAM L-S SPINE 2/3 VWS: CPT | Mod: TC,PO

## 2024-10-07 PROCEDURE — 99999 PR PBB SHADOW E&M-EST. PATIENT-LVL V: CPT | Mod: PBBFAC,,, | Performed by: INTERNAL MEDICINE

## 2024-10-07 PROCEDURE — 72100 X-RAY EXAM L-S SPINE 2/3 VWS: CPT | Mod: 26,,, | Performed by: RADIOLOGY

## 2024-10-07 PROCEDURE — 99214 OFFICE O/P EST MOD 30 MIN: CPT | Mod: S$GLB,,, | Performed by: INTERNAL MEDICINE

## 2024-10-07 RX ORDER — OMEPRAZOLE 40 MG/1
40 CAPSULE, DELAYED RELEASE ORAL EVERY MORNING
Qty: 180 CAPSULE | Refills: 0 | Status: SHIPPED | OUTPATIENT
Start: 2024-10-07

## 2024-10-07 RX ORDER — VITAMIN B COMPLEX
1 TABLET ORAL WEEKLY
Qty: 90 EACH | Refills: 3 | Status: SHIPPED | OUTPATIENT
Start: 2024-10-07

## 2024-10-07 RX ORDER — ASPIRIN 325 MG
50000 TABLET, DELAYED RELEASE (ENTERIC COATED) ORAL
Qty: 12 CAPSULE | Refills: 3 | Status: SHIPPED | OUTPATIENT
Start: 2024-10-07

## 2024-10-07 RX ORDER — FERROUS SULFATE 325(65) MG
325 TABLET, DELAYED RELEASE (ENTERIC COATED) ORAL DAILY
Qty: 90 TABLET | Refills: 3 | Status: SHIPPED | OUTPATIENT
Start: 2024-10-07

## 2024-10-07 NOTE — PROGRESS NOTES
Results have been released via [employer]. Please verify that these have been viewed by patient. If not, please call patient with results.    I have sent a message to them with the following interpretation (see below).    I have reviewed your recent results.    Xr of the lumbar spine does show degenerative, or arthritis changes. I would recommend trying physical therapy as we discussed in clinic. If this does not help, or if symptoms worsens, then we would move forward with MRI and specialist referral.    Please do not hesitate to call or message with any additional questions or concerns.    Terrie Hartmann MD

## 2024-10-07 NOTE — PROGRESS NOTES
Cancer Genetics  Hereditary and High-Risk Clinic  Department of Hematology and Oncology  [facility]    Date of Service:  10/9/24  Visit Provider:  Laurie Garcia MS, [facility]    Patient ID  Name: Felicia Drew    : 1970    MRN: [id number]      Referring Provider  Terrie Hartmann MD  [id number]  [address]  [place] [address]    Televisit Information  The patient location is: [place], [place]    The chief complaint leading to consultation is:  As below.    Visit type: audiovisual.      Face-to-face time with patient:  Approximately 39 minutes.    Approximately 90 minutes in total were spent on this encounter, which includes face-to-face time and non-face-to-face time preparing to see the patient (e.g., review of records and tests), obtaining and/or reviewing separately obtained history, documenting clinical information in the electronic or other health record, independently interpreting results (not separately reported) and communicating results to the patient/family/caregiver, or care coordination (not separately reported).  Each patient to whom he or she provides medical services by telemedicine is:  (1) informed of the relationship between the physician and patient and the respective role of any other health care provider with respect to management of the patient; and (2) notified that he or she may decline to receive medical services by telemedicine and may withdraw from such care at any time.    MAXIMO Drew is a pleasant 53yo female patient who presents to genetic counseling today due to her family history of stomach, pancreatic, and other cancers. Jennifer's brother recently passed away in  from stomach/liver/esophageal cancer (origin unknown) and she now is seeking more information to be proactive regarding her health. Jennifer meets NCCN genetic testing criteria for Templeton Syndrome on both her paternal and maternal side of the family: through  "her brother and paternal uncle who had stomach cancer in his 30s, as well as by her brother, maternal aunt with stomach cancer, and maternal grandfather with pancreatic cancer. These are all Templeton-syndrome associated cancers. We discussed that her paternal uncle being in his 30s is suspicious, as well as that pancreatic and stomach cancer can sometimes run together in families. Jennifer elected to proceed with genetic testing today through [facility] Expanded DNA+RNA panel. An appointment to have her blood drawn is scheduled for 10/18/2024.     FOCUSED PERSONAL HISTORY       Chief Complaint: Genetic Evaluation (Family history of stomach, pancreatic, and other cancers)    History of Present Illness (HPI):  Felicia Drew ("Jennifer"), 54 y.o., assigned female sex at birth, is new to the [facility] of [facility]ology and [facility] and to me.  She was referred by  Primary Care  for hereditary cancer risk assessment given her family history of stomach cancer in her brother, as well as other cancers.    Cancer History  No personal history of cancer     No Masses/tumors/lesions    Focused Medical History  Previous germline cancer genetic testing:  No  Colonoscopy: Yes  Most recent colonoscopy: 4/11/2024  Colon polyp:  Yes - rectal polyp on most recent, previous sigmoid polyp  Mammogram: Yes  Most recent mammogram: 11/27/2023  Breast MRI:  No  Pancreatitis:  No    Focused Surgical History  Reproductive organs:  s/p hysterectomy and s/p bilateral salpingo-oophorectomy    Tobacco Use  Tobacco Use: Medium Risk (10/7/2024)    Patient History     Smoking Tobacco Use: Former     Smokeless Tobacco Use: Never     Passive Exposure: Not on file     Former smoker  Total number of years smoked:  10  Average number of packs smoked:  3 packs/week  = 4.3 pack-years       FAMILY HISTORY     Cancer Pedigree    Brother with stomach cancer/esophageal/liver cancer (exact origin unknown) at age 69, had melanoma    Maternal:  Aunt " with stomach cancer at age 69  Grandfather with pancreatic cancer in his 60s    Paternal:  Father with lung cancer in his 70s  Uncle with stomach cancer in his 30s    A family history of birth defects, intellectual disability, SIDS, sudden early death, multiple miscarriages and consanguinity were denied. Please refer to above pedigree for further details. A larger copy has been scanned in the Media tab.     DISCUSSION     Approximately 5-10% of cancers are hereditary. Things that make us suspicious of a hereditary cause of cancer include individuals with rare tumors and cancers, the type of cancers seen in the family, number of people with cancer, ages of people with cancer, and certain cancer pathologies.The majority of hereditary Templeton-syndrome related cancers are caused by mutations in MSH2, MSH6, MLH1, PMS2, and EPCAM. Jennifer meets NCCN criteria for Templeton syndrome genetic testing based on her brother having stomach cancer and her paternal uncle having stomach cancer in his 30s. Additionally she meets criteria through her maternal side by her brother having stomach cancer, her maternal aunt having stomach cancer, and maternal grandfather having pancreatic cancer. Therefore, she was offered phenotype-driven and broad panel testing. Jennifer opted for the [facility] Expanded DNA+RNA panel through 71 of the following  genes associated with hereditary colorectal, stomach, pancreatic, breast, ovarian prostate, and other cancers:    AIP, ALK, APC, AISLINN, AXIN2, BAP1, BARD1, BMPR1A, BRCA1, BRCA2, BRIP1, CDC73, CDH1, CDK4, CDKN1B, CDKN2A, CHEK2, CTNNA1, DICER1, EGFR, EGLN1, EPCAM, FH, FLCN, GREM1, HOXB13, KIF1B, KIT, LZTR1, MAX, MEN1, MET, MITF, MLH1, MSH2, MSH3, MSH6, MUTYH, NF1, NF2, NTHL1, PALB2, PDGFRA, PHOX2B, PMS2, POLD1, POLE, POT1, DORDY4Y, PTCH1, PTEN, RAD51C, RAD51D, RB1, RET, SDHA, SDHAF2, SDHB, SDHC, SDHD, SMAD4, SMARCA4, SMARCB1, SMARCE1, STK11, SUFU, AIUQ028, TP53, TSC1, TSC2, VHL     We reviewed that mutations  in the highly penetrant genes put an individual at a significantly increased risk of gastrointestinal cancer and other cancers. There are established screening and surgery guidelines for these syndromes. Mutations in the moderately penetrant genes increase the risk of gastrointestinal and other cancers, but less is understood regarding their role in cancer risk. There may not be standard screening or management guidelines for individuals who have mutations in these genes.     Furthermore, we discussed the psychosocial implications of a positive result, including anxiety, fear and guilt if a mutation is passed on to a child. Jennifer expressed having to balance the anxiety of getting an answer versus wanting to be proactive. She plans to discuss things further with her sister who is also interested in genetic testing.     Possible Results:    Positive (pathogenic or likely pathogenic variant): A genetic variant was found that is suspected or known to impact the function of the gene. The impact of a positive result on an individual's risk of cancer varies based on the gene, specific variant, individual's sex assigned at birth, personal cancer history, other health history (such as surgical history), and family history. A positive result can impact screening and risk management recommendations. However, there are not always available guidelines for management based on a specific gene variant. Family history and personal risk factors should always be considered. Sometimes, a positive result can also have treatment or reproductive implications.   Negative: No clinically significant variants were reported in the tested genes. A negative result does not indicate that an individual cannot develop cancer or even that the individual is at average risk. An individual may still be at an increased risk for cancer based on personal risk factors or family history. Additionally, there could be a hereditary cancer predisposition that was  not included in a chosen panel or is not detected with current technology.   Variant of Uncertain Significance (VUS): A variant was found. However, the lab does not have enough information to determine if the variant is benign (harmless) or pathogenic (impacts the function of the gene). The laboratory may update (reclassify) the variant over time as more information becomes available. When reclassified, most variants of uncertain significance are reclassified to benign/likely benign. Typically, it is not recommended to  based on the presence of a VUS. The chance of finding a VUS varies based on the test performed. Generally, the chance of finding a VUS increases with the number of genes tested and decreases with the amount of testing of that gene (genes that are tested more frequently or for a longer period of time have a lower VUS rate).    Genetic Mutation Inheritance:  When an individual has a gene mutation, their first-degree relatives (parents, children, and siblings) each have a 50% chance of carrying the same mutation. Other, more distant blood relatives can also be at risk of carrying the same mutation. At-risk relatives of an individual with a mutation should consider genetic testing to help determine their risk for cancer.     Genetic Discrimination: The Genetic Information Nondiscrimination Act of 2008 (EMELIA) is a [place] federal law that provides some protections against the use of an individual's genetic information by their health insurer and by their employer. Title I of EMELIA prohibits most health insurers (except for insurance obtained through a job with the  or the Federal Employees Health Benefits Plan) from utilizing an individual's genetic information to make decisions regarding insurance eligibility or premium charges. Title II of EMELIA prohibits covered entities from requesting or requiring the genetic information of employees and applicants and from using said information  "to make employment decisions. This does not apply to employers with fewer than 15 employees or to the .  EMELIA also does not protect individuals from genetic discrimination by any other type of policy or entity, including but not limited to life insurance, disability insurance, long-term care insurance,  benefits, and [facility] benefits.    There is also a possibility for the patient to incur out-of-pocket costs related to this testing. Jennifer appeared to have a good understanding of the information as she asked appropriate questions.  Jennifer received comprehensive counseling regarding panel testing and has elected to proceed with this testing. A sample was scheduled to be submitted on 10/18/2024 to [facility].  Jennifer's results should be available in approximately 3 weeks.  In the meantime, she is welcome to contact me if she has any questions, concerns, or updates to her family history.       ASSESSMENT / PLAN      Felicia Drew ("Jennifer"), 54 y.o., presented today for hereditary cancer risk assessment and genetic counseling given her family history of stomach, pancreatic, and other cancers. Jennifer's brother recently passed away in June from stomach/liver/esophageal cancer (origin unknown) and she now is seeking more information to be proactive regarding her health. Jennifer meets NCCN genetic testing criteria for Templeton Syndrome on both her paternal and maternal side of the family: through her brother and paternal uncle who had stomach cancer in his 30s, as well as by her brother, maternal aunt with stomach cancer, and maternal grandfather with pancreatic cancer. These are all Templeton-syndrome associated cancers. We discussed that her paternal uncle being in his 30s is suspicious, as well as that pancreatic and stomach cancer can sometimes run together in families. Jennifer elected to proceed with genetic testing today through [facility] Expanded DNA+RNA panel. An appointment to have " her blood drawn is scheduled for 10/18/2024.         ICD-10-CM ICD-9-CM   1. Family history of cancer  Z80.9 V16.9   2. Family history of pancreatic cancer  Z80.0 V16.0   3. Family history- stomach cancer  Z80.0 V16.0   4. Family history of lung cancer  Z80.1 V16.1   5. Family history of melanoma  Z80.8 V16.8     1. Family history of cancer  - Genetic Misc Sendout Test, Blood; Future    2. Family history of pancreatic cancer  - Genetic Misc Sendout Test, Blood; Future    3. Family history- stomach cancer  - Genetic Misc Sendout Test, Blood; Future    4. Family history of lung cancer  - Genetic Misc Sendout Test, Blood; Future    5. Family history of melanoma  - Genetic Misc Sendout Test, Blood; Future       Genetic Test Information  Testing lab: [facility]   Test panel: [facility]Next Expanded DNA+RNA   ICD-10 code(s): Z80.9, Z80.0 Z80.1, Z80.8   Verbal informed consent: Obtained   Written informed consent: To be obtained   Specimen type: Blood  (Patient denies blood disorders that would necessitate a skin fibroblast specimen)   Specimen collection by: [facility]   Specimen collection date: 10/18/2024   Results expected by: Approximately 2-3 weeks after the genetic testing lab receives the specimen   Results disclosure plan: Post-test visit if positive or complex result; otherwise, results will be communicated through phone call       Follow-up:  I will call Jennifer 2-3 weeks after her blood is drawn once her results are available.     Questions were encouraged and answered to the patient's satisfaction, and she verbalized understanding of the information and agreement with the plan.         Approximately 39 minutes were spent face-to-face with the patient.  Approximately 90 minutes in total were spent on this encounter, which includes face-to-face time and non-face-to-face time preparing to see the patient (e.g., review of tests), obtaining and/or reviewing separately obtained history, documenting clinical information  in the electronic or other health record, independently interpreting results (not separately reported) and communicating results to the patient/family/caregiver, or care coordination (not separately reported).     This assessment is based on the history and reports provided, as well as the current scientific knowledge regarding cancer genetics.         Laurie Garcia MS, [facility]  Genetic Counselor, Hereditary and High-Risk Clinic  Department of Hematology and Oncology  [facility]        CC:  Dr. Terrie Hartmann

## 2024-10-07 NOTE — PROGRESS NOTES
Assessment/Plan:    Problem List Items Addressed This Visit          Cardiac/Vascular    Family history of abdominal aortic aneurysm (AAA)    Relevant Orders    US AAA Screening       Endocrine    Vitamin D deficiency    Overview     -remains on vitd 50k weekly         Relevant Medications    cholecalciferol, vitamin D3, 1,250 mcg (50,000 unit) capsule    Vitamin B12 deficiency    Overview     -has been out of PO B12; refill sent       Relevant Medications    cyanocobalamin, vitamin B-12, (VITAMIN B-12) 2,500 mcg Subl     Other Visit Diagnoses       Encounter for long-term (current) use of medications    -  Primary    Iron deficiency        Relevant Medications    ferrous sulfate 325 (65 FE) MG EC tablet    History of Audra-en-Y gastric bypass        Relevant Medications    omeprazole (PRILOSEC) 40 MG capsule    Vomiting (bilious) following gastrointestinal surgery        Relevant Medications    omeprazole (PRILOSEC) 40 MG capsule    Encounter for screening mammogram for breast cancer        Relevant Orders    Mammo Digital Screening Bilat w/ Ruperto    Family history of cancer        Relevant Orders    Ambulatory referral/consult to Genetics    Chronic left-sided low back pain without sciatica      -chronic symptoms of left sided lower back pain  -no alarm symptoms or signs present  -continue conservative treatment  -heat applications, PRN anti-inflammatory medication  -PT recommended     Relevant Orders    X-Ray Lumbar Spine AP And Lateral (Completed)            Follow up in about 1 year (around 10/7/2025), or if symptoms worsen or fail to improve, for xr today; MMG >11/28; AAA US; genetics virtual appt.    Terrie Hartmann MD  _____________________________________________________________________________________________________________________________________________________    CC: follow up of chronic medical conditions     Patient is in clinic today as an established patient.    History of Present Illness  The  patient is a 54-year-old female here for her annual exam.    She has been consistent in taking her iron, Prilosec, and vitamin D supplements but has not been taking B12. She was previously on Wegovy 1.7 mg but found it ineffective and costly due to insurance changes. She is due for a mammogram in November 2024 and an aortic aneurysm check in December 2024. She recently lost her brother to cancer and has been advised to undergo cancer screening. Her father and paternal grandfather also have a history of cancer. She plans to receive her influenza vaccine at work.    She experiences pain in her left lower back, which she describes as feeling internal rather than muscular. The pain does not radiate down her leg. Initially, she thought it was sciatica, but the pain is localized. She has tried stretching exercises with little relief. The pain is particularly noticeable when she stands up from a recliner. She reports no abdominal pain, leg weakness, numbness, tingling, or urinary issues. She has tried Tylenol for the pain, but it did not provide significant relief.    FAMILY HISTORY  Her father had lung cancer. Her brother had skin and lung cancer. Her paternal grandfather had pancreatic cancer. Aneurysms are coming from her mother's side.     No other new complaints today.  Remaining chronic conditions have been reviewed and remain stable. Further detail as stated above.        Current Outpatient Medications on File Prior to Visit   Medication Sig Dispense Refill    acetaminophen (TYLENOL) 500 MG tablet Take 1 tablet (500 mg total) by mouth every 6 (six) hours as needed for Pain.  0    multivitamin capsule Take 3 capsules by mouth 3 (three) times daily.      ondansetron (ZOFRAN-ODT) 4 MG TbDL Take 2 tablets (8 mg total) by mouth every 8 (eight) hours as needed (nausea). 30 tablet 0    semaglutide, weight loss, (WEGOVY) 1.7 mg/0.75 mL PnIj Inject 1.7 mg into the skin every 7 days. 9 mL 0    traMADoL (ULTRAM) 50 mg  tablet Take 1 tablet (50 mg total) by mouth every 6 (six) hours as needed for Pain. 30 tablet 0    triamcinolone acetonide 0.1% (KENALOG) 0.1 % cream APPLY TOPICALLY TWICE A DAY 30 g 0    [DISCONTINUED] cholecalciferol, vitamin D3, 1,250 mcg (50,000 unit) capsule TAKE 1 CAPSULE BY MOUTH EVERY 7 DAYS 12 capsule 3    [DISCONTINUED] cyanocobalamin, vitamin B-12, 2,500 mcg Subl Place 1 tablet under the tongue once a week.      [DISCONTINUED] ferrous sulfate 325 (65 FE) MG EC tablet Take 1 tablet (325 mg total) by mouth once daily. 90 tablet 3    [DISCONTINUED] omeprazole (PRILOSEC) 40 MG capsule Take 1 capsule (40 mg total) by mouth every morning. 180 capsule 0    nystatin (NYSTOP) powder Apply topically 3 (three) times daily. Apply to affected area 1 Bottle 3    [DISCONTINUED] diclofenac sodium (VOLTAREN) 1 % Gel APPLY TO AFFECTED AREA 2 GRAMS ONCE DAILY (Patient not taking: Reported on 10/7/2024) 100 g 1    [DISCONTINUED] docusate sodium (COLACE) 100 MG capsule Take 1 capsule (100 mg total) by mouth 2 (two) times daily. (Patient not taking: Reported on 10/7/2024)  0     No current facility-administered medications on file prior to visit.       Review of Systems   Constitutional:  Negative for chills, diaphoresis, fatigue and fever.   HENT:  Negative for congestion, ear pain, postnasal drip, sinus pain and sore throat.    Eyes:  Negative for pain and redness.   Respiratory:  Negative for cough, chest tightness and shortness of breath.    Cardiovascular:  Negative for chest pain and leg swelling.   Gastrointestinal:  Negative for abdominal pain, constipation, diarrhea, nausea and vomiting.   Genitourinary:  Negative for dysuria and hematuria.   Musculoskeletal:  Positive for back pain. Negative for arthralgias and joint swelling.   Skin:  Negative for rash.   Neurological:  Negative for dizziness, syncope and headaches.   Psychiatric/Behavioral:  Negative for dysphoric mood. The patient is not nervous/anxious.   "    Vitals:    10/07/24 0746   BP: 124/74   Pulse: 81   SpO2: 98%   Weight: 118.7 kg (261 lb 9.6 oz)   Height: 5' 2" (1.575 m)       Wt Readings from Last 3 Encounters:   10/07/24 118.7 kg (261 lb 9.6 oz)   05/08/24 121.7 kg (268 lb 4.8 oz)   04/09/24 122.5 kg (270 lb)       Physical Exam  Constitutional:       General: She is not in acute distress.     Appearance: Normal appearance. She is well-developed. She is obese.   HENT:      Head: Normocephalic and atraumatic.   Eyes:      Conjunctiva/sclera: Conjunctivae normal.   Cardiovascular:      Rate and Rhythm: Normal rate and regular rhythm.      Pulses: Normal pulses.      Heart sounds: Normal heart sounds. No murmur heard.  Pulmonary:      Effort: Pulmonary effort is normal. No respiratory distress.      Breath sounds: Normal breath sounds.   Abdominal:      General: Bowel sounds are normal. There is no distension.      Palpations: Abdomen is soft.      Tenderness: There is no abdominal tenderness.   Musculoskeletal:         General: Normal range of motion.      Cervical back: Normal range of motion and neck supple.   Skin:     General: Skin is warm and dry.      Findings: No rash.   Neurological:      General: No focal deficit present.      Mental Status: She is alert and oriented to person, place, and time.      Sensory: No sensory deficit.      Motor: No weakness.      Coordination: Coordination normal.      Gait: Gait normal.   Psychiatric:         Mood and Affect: Mood normal.         Behavior: Behavior normal.     DISCLAIMER: This note was compiled by using a speech recognition dictation system and therefore please be aware that typographical / speech recognition errors can and do occur.  Please contact me if you see any errors specifically.  Consent was obtained for [employer] recording system prior to the visit.  "

## 2024-10-07 NOTE — PATIENT INSTRUCTIONS
Lucio Duarte,     If you are due for any health screening(s) below please notify me so we can arrange them to be ordered and scheduled. Most healthy patients at your age complete them, but you are free to accept or refuse.     If you can't do it, I'll definitely understand. If you can, I'd certainly appreciate it!    All of your core healthy metrics are met.

## 2024-10-08 PROBLEM — Z80.9 FAMILY HISTORY OF CANCER: Status: ACTIVE | Noted: 2024-10-08

## 2024-10-08 PROBLEM — Z80.0 FAMILY HISTORY OF PANCREATIC CANCER: Status: ACTIVE | Noted: 2024-10-08

## 2024-10-09 ENCOUNTER — OFFICE VISIT (OUTPATIENT)
Dept: HEMATOLOGY/ONCOLOGY | Facility: CLINIC | Age: 54
End: 2024-10-09
Payer: COMMERCIAL

## 2024-10-09 DIAGNOSIS — Z80.0 FAMILY HISTORY- STOMACH CANCER: ICD-10-CM

## 2024-10-09 DIAGNOSIS — Z80.0 FAMILY HISTORY OF PANCREATIC CANCER: ICD-10-CM

## 2024-10-09 DIAGNOSIS — Z80.1 FAMILY HISTORY OF LUNG CANCER: ICD-10-CM

## 2024-10-09 DIAGNOSIS — Z80.9 FAMILY HISTORY OF CANCER: Primary | ICD-10-CM

## 2024-10-09 DIAGNOSIS — Z80.8 FAMILY HISTORY OF MELANOMA: ICD-10-CM

## 2024-10-09 PROCEDURE — 96040 PR GENETIC COUNSELING, EACH 30 MIN: CPT | Mod: 95,,, | Performed by: INTERNAL MEDICINE

## 2024-10-09 PROCEDURE — 99499 UNLISTED E&M SERVICE: CPT | Mod: 95,,, | Performed by: INTERNAL MEDICINE

## 2024-10-11 NOTE — PROGRESS NOTES
"[facility]   Physical Therapy Initial Evaluation      Date: 10/14/2024   Name: Felicia Drew  Clinic Number: [id number]    Therapy Diagnosis:  No diagnosis found.   Physician: Terrie Hartmann MD     Physician Orders: PT Eval and Treat  Medical Diagnosis from Referral: M51.360 (ICD-10-CM) - Degeneration of intervertebral disc of lumbar region with discogenic back pain   Evaluation Date: 10/14/2024  Authorization Period Expiration: 10/7/25  Plan of Care Expiration: 2024  Progress Note Due: 2024  Visit # / Visits authorized:   FOTO: 1/3 (last performed on 10/14/2024)    Precautions: Standard and ANDREA    Time In: 7:00 AM  Time Out: 7:55 AM  Total Billable Time (timed & untimed codes): 55 minutes    SUBJECTIVE     Date of onset: ***    History of current condition - Jennifer reports ***    Current Activity Level:     Falls: [] No  [] Yes:     Imaging: [] Xray [] MRI [] CT:   ***    Prior Therapy:  [] No  [] Yes:   Social History: Patient {LIVES WITH:07778} [] House [] Apartment/Condo []other  Stairs: [] No  [] Yes:   Occupation: Patient is ***  School/Work Restrictions: [] none  Prior Level of Function: ***  Current Level of Function:  ***    Pain:  Current {0-10:40714::"0"}/10, worst {0-10:::"0"}/10, best {0-10:::"0"}/10   Location: {RIGHT LEFT BILATERAL:05694} {LOCATION ON BODY:46277} {Pain Loc:88777}  Description: {Pain Description:49370}  Aggravating Factors: ***  Easing Factors: {Pain (activities that relieve):33684}    Dominant Extremity:    [x] Right    [] Left    Patients goals: ***    Medical History:   Past Medical History:   Diagnosis Date    H/O hysterectomy for benign disease with BSO 2017    Morbid obesity     Varicose veins     Vitamin D deficiency        Surgical History:   Felicia Drew  has a past surgical history that includes Laparoscopic gastric banding;  section; Cholecystectomy; Tonsillectomy; Vascular surgery; victoriano " filter placement; Gastric bypass; lap band removal ; Hysterectomy; Oophorectomy; victoriano filter removal; Esophagogastroduodenoscopy (N/A, 06/18/2020); Colonoscopy (N/A, 06/18/2020); and Colonoscopy (N/A, 4/11/2024).    Medications:   Felicia has a current medication list which includes the following prescription(s): acetaminophen, cholecalciferol (vitamin d3), cyanocobalamin (vitamin b-12), ferrous sulfate, multivitamin, nystatin, omeprazole, ondansetron, wegovy, tramadol, and triamcinolone acetonide 0.1%.    Allergies:   Review of patient's allergies indicates:   Allergen Reactions    Brompheniramin-phenylephrin-dm      Other reaction(s): Other (See Comments)  Per patient, caused dizziness + blurry vision     Erythromycin Rash    Erythromycin (bulk) Rash        OBJECTIVE     Posture:  Patient presents with postural abnormalities which include:    [] Forward Head   [] Increased Lumbar Lordosis   [] Rounded Shoulder   [] Flat Back Posture   [] Increased Thoracic Kyphosis [] Pes Planus   [] Increased Trunk Sway  [] Valgus knee position   [] Increased Trunk Rotation  [] Varus knee position   [] Increased cervical lordosis [] Other:    Sensation:    Sensation to light touch over UE's is  [] Intact [] Impaired [] Defer  Sensation to light touch over LE's is  [] Intact [] Impaired [] Defer    Reflexes:  Patellar    [] Defer [] Normal [] Hyper []  Hypo   Achilles   [] Defer [] Normal [] Hyper []  Hypo  Tricep   [] Defer [] Normal [] Hyper []  Hypo  Brachioradialis [] Defer [] Normal [] Hyper []  Hypo      Gait Analysis: The patient ambulated with the following assistive device: {Assistive Device:24413}; the patient presents with the following gait abnormalities: {cahgaitdeviations:39984}     Balance  Right   (seconds) Left  (seconds) Norms   Single Leg Stance *** *** Less than 4.9 sec high risk  5-6.4 sec increased risk  6.5 or greater low risk             Functional Tests  Outcome Norms   Timed Up and Go *** <13.5 sec    30 second Sit to Stand *** Age Male Female   60-64 <14 <12   65-69 <12 <11   70-74 <12 <10   75-79 <11 <10   80-84 <10 <9   85-89 <8 <8   90-93 <7 <4         Range of Motion:    Lumbar ROM Right  (spine) Left   Pain/Dysfunction with Movement   Lumbar Flexion (60º) *** ---    Lumbar Extension (30º) *** ---    Lumbar Side Bending (25º) *** ***    Lumbar Rotation *** ***      Strength:    L/E MMT Right  (spine) Left Pain/Dysfunction with Movement   Hip Flexion  {AMB PT VESTIBULAR STRENGTH:67073} {AMB PT VESTIBULAR STRENGTH:12176}    Hip Extension  {AMB PT VESTIBULAR STRENGTH:69546} {AMB PT VESTIBULAR STRENGTH:97939}    Hip Abduction  {AMB PT VESTIBULAR STRENGTH:34499} {AMB PT VESTIBULAR STRENGTH:80784}    Knee Extension {AMB PT VESTIBULAR STRENGTH:26977} {AMB PT VESTIBULAR STRENGTH:72808}    Knee Flexion {AMB PT VESTIBULAR STRENGTH:67991} {AMB PT VESTIBULAR STRENGTH:76234}    Hip IR {AMB PT VESTIBULAR STRENGTH:22203} {AMB PT VESTIBULAR STRENGTH:38564}    Hip ER {AMB PT VESTIBULAR STRENGTH:06609} {AMB PT VESTIBULAR STRENGTH:44921}    Ankle DF {AMB PT VESTIBULAR STRENGTH:29017} {AMB PT VESTIBULAR STRENGTH:73209}    Ankle PF {AMB PT VESTIBULAR STRENGTH:70522} {AMB PT VESTIBULAR STRENGTH:46436}    Ankle Inversion {AMB PT VESTIBULAR STRENGTH:91145} {AMB PT VESTIBULAR STRENGTH:36877}    Ankle Eversion {AMB PT VESTIBULAR STRENGTH:48640} {AMB PT VESTIBULAR STRENGTH:72509}        Muscle Length:   {urm14zguyngljuyocdevw:90139}    Special Tests:     Right  (spine) Left    Lumbar Distraction  [] Positive    [x] Negative ---   Lumbar Compression [] Positive    [x] Negative ---   SLUMP [] Positive    [x] Negative [] Positive    [x] Negative   ANETA [] Positive    [x] Negative [] Positive    [x] Negative     Palpation:  {cahpalpation:59855}    FOTO:  Intake Outcome Measure for FOTO Lumbar Spine Survey    Therapist reviewed FOTO scores for Felicia Drew on 10/14/2024.   FOTO documents entered into [employer] - see Media section or  "FOTO account episode details..    Intake Score: ***     TREATMENT     Total Treatment time (time-based codes) separate from Evaluation: (16) minutes     Jennifer received the treatments listed below:   Jennifer received therapeutic exercises to develop strength, endurance, ROM, and flexibility for 8 minutes including:    ***    Jennifer participated in neuromuscular re-education activities to improve: {AMB PT PROGRESS NEURO RE-ED:49686} for 8 minutes. The following activities were included:    ***    Plan for Next Visit: ***      PATIENT EDUCATION AND HOME EXERCISES     Education provided: (during treatment session) minutes  Home exercise program Administration and Review  Post Exercise Soreness  Anatomy/Physiology of the *** and the surrounding musculature    Written Home Exercises Provided: {Blank single:50136::"yes","Patient instructed to cont prior HEP"}.  Exercises were reviewed and Jennifer was able to demonstrate them prior to the end of the session.  Jennifer demonstrated {Desc; good/fair/poor:35064} understanding of the education provided. See EMR under Patient Instructions for exercises provided during therapy sessions.    ASSESSMENT     Felicia is a 54 y.o. female referred to outpatient Physical Therapy with a medical diagnosis of Degeneration of intervertebral disc of lumbar region with discogenic back pain. The patient presents with signs and symptoms consistent with diagnosis and impairments which include {Rehab identified problem list/impairments:99486}.      Patient prognosis is {REHAB PROGNOSIS OHS:38082}, if patient is consistent and compliant with Physical Therapy and home exercise program.  Patient will benefit from skilled outpatient Physical Therapy to address the deficits stated above and in the chart below, provide patient/family education, and to maximize patient's level of independence.     Plan of care discussed with patient: Yes  Patient's spiritual, cultural and educational needs considered and patient is " agreeable to the plan of care and goals as stated below:     Anticipated Barriers for therapy: {barriers for care:95706}      Medical Necessity is demonstrated by the following:  History  Co-morbidities and personal factors that may impact the plan of care [] LOW: no personal factors / co-morbidities  [] MODERATE: 1-2 personal factors / co-morbidities  [] HIGH: 3+ personal factors / co-morbidities    Moderate / High Support Documentation:   Past Medical History:   Diagnosis Date    H/O hysterectomy for benign disease with BSO 2/2/2017    Morbid obesity     Varicose veins     Vitamin D deficiency          Examination  Body Structures and Functions, activity limitations and participation restrictions that may impact the plan of care [] LOW: addressing 1-2 elements  [] MODERATE: 3+ elements  [] HIGH: 4+ elements (please support below)    Moderate / High Support Documentation: See evaluation / objective measurements above and FOTO.     Clinical Presentation [] LOW: stable  [] MODERATE: Evolving  [] HIGH: Unstable     Decision Making/ Complexity Score: {Desc; low/moderate/high:132163}         Goals:  Reviewed: 10/14/2024      Short Term Goals: In 4 weeks  Progress Date   1.Patient to be educated on HEP. [x] Progressing  [] MET   [] Not MET   [] Not tested    2.Patient to increase *** range of motion to ***, in order to improve available range of motion for ADL's.  [x] Progressing  [] MET   [] Not MET  [] Not tested    3.Patient to increase *** strength by 1/2 grade, in order to improve endurance and increase ability to perform all functional activities for increased time.  [x] Progressing  [] MET   [] Not MET  [] Not tested    4.Patient to have pain less than ***/10 at worst, to improve QOL. [x] Progressing  [] MET   [] Not MET  [] Not tested    5.Patient to improve score on the FOTO, to improve QOL. [x] Progressing  [] MET   [] Not MET  [] Not tested    6. Patient to improve score on {cahfunctionaltests:45310} in order  to decrease fall risk. [x] Progressing  [] MET   [] Not MET  [] Not tested      Long Term Goals: In 8 weeks Progress Date   1.Patient to improve score on the FOTO predicted score or better, to improve QOL. [x] Progressing  [] MET   [] Not MET  [] Not tested    2. Patient to increase *** strength to ***/5 or greater, in order to improve endurance and increase ability to perform all functional activities for increased time. [x] Progressing  [] MET   [] Not MET  [] Not tested    3. Patient to have decreased pain to ***/10 at worst, to improve QOL. [x] Progressing  [] MET   [] Not MET  [] Not tested    4. Patient to normalize score on ***, in order to improve endurance and decrease fall risk. [x] Progressing  [] MET   [] Not MET  [] Not tested    5. Patient to perform daily activities including *** without increased symptoms. [x] Progressing  [] MET   [] Not MET  [] Not tested      PLAN     Plan of care Certification: 10/14/2024  to 12/14/24.    Outpatient Physical Therapy {NUMBERS 1-5:05072} times weekly for {NUMBERS 1-12:68625} weeks to include any combination of the following interventions: [facility]}    This patient CAN be treated by a PTA.    Aleksandra Razo, PT, DPT, Cert. DN

## 2024-10-14 ENCOUNTER — CLINICAL SUPPORT (OUTPATIENT)
Dept: REHABILITATION | Facility: HOSPITAL | Age: 54
End: 2024-10-14
Attending: INTERNAL MEDICINE
Payer: COMMERCIAL

## 2024-10-14 DIAGNOSIS — R29.898 DECREASED STRENGTH OF LOWER EXTREMITY: ICD-10-CM

## 2024-10-14 DIAGNOSIS — R68.89 DECREASED FUNCTIONAL ACTIVITY TOLERANCE: Primary | ICD-10-CM

## 2024-10-14 DIAGNOSIS — M51.360 DEGENERATION OF INTERVERTEBRAL DISC OF LUMBAR REGION WITH DISCOGENIC BACK PAIN: ICD-10-CM

## 2024-10-14 DIAGNOSIS — M53.86 DECREASED ROM OF LUMBAR SPINE: ICD-10-CM

## 2024-10-14 PROCEDURE — 97161 PT EVAL LOW COMPLEX 20 MIN: CPT | Mod: PN

## 2024-10-14 PROCEDURE — 97110 THERAPEUTIC EXERCISES: CPT | Mod: PN

## 2024-10-14 PROCEDURE — 97112 NEUROMUSCULAR REEDUCATION: CPT | Mod: PN

## 2024-10-14 NOTE — PLAN OF CARE
[facility]   Physical Therapy Initial Evaluation      Date: 10/14/2024   Name: Felicia Drew  Clinic Number: [id number]    Therapy Diagnosis:    Encounter Diagnoses   Name Primary?    Degeneration of intervertebral disc of lumbar region with discogenic back pain     Decreased functional activity tolerance Yes    Decreased ROM of lumbar spine     Decreased strength of lower extremity       Physician: Terrie Hartmann MD     Physician Orders: PT Eval and Treat  Medical Diagnosis from Referral: M51.360 (ICD-10-CM) - Degeneration of intervertebral disc of lumbar region with discogenic back pain   Evaluation Date: 10/14/2024  Authorization Period Expiration: 10/7/25  Plan of Care Expiration: 12/14/2024  Progress Note Due: 11/14/2024  Visit # / Visits authorized: (1/1 Eval)  FOTO: 1/3 (last performed on 10/14/2024)    Precautions: Standard and ANDREA    Time In: 7:00 AM  Time Out: 7:55 AM  Total Billable Time (timed & untimed codes): 55 minutes    SUBJECTIVE     Date of onset: 6-8 months ago.    History of current condition - Jennifer reports that she has pain in the lower left buttocks region. There is no tenderness to palpation. This has been going on for about 6-8 months and is only located in that one spot. 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 Last night she had a tingling pain along the lateral aspect of of the left leg and it is still lingering around this morning - this is new. Prior to that, she does not recall any numbness or tingling. No recent MRI, only an xray. Taking over the counter Tylenol as needed. No ice or heat. Bending over feels good after standing for long periods of time. She does feel like her stools are different compared to when this first started. She has not seen a neurologist, only Dr. Hartmann. She normally walks about 1 mile 3 days per week for activity. She has noticed that her balance is off a little bit. No actually falls. No prior surgery  to the back, hips, buttocks region. No other medical conditions to be concerned with at this time.    Current Activity Level: walking about 1 mile 3 days per week    Falls: [x] No  [] Yes:     Imaging: [x] Xray [] MRI [] CT:   FINDINGS:  Alignment is normal.  There is mild to moderate degenerative disc space narrowing and spondylosis visible at L3-4 and L4-5.  Vertebral body heights are maintained. No fractures are identified. The sacroiliac joints appear normal and symmetrical.     Impression:   Chronic disc degeneration from L3 through L5, no evidence of acute injury is appreciated.     Finalized on: 10/7/2024 1:25 PM By:  Srinivas Velasquez    Prior Therapy:  [] No  [x] Yes: long time ago  Social History: Patient lives with their family [x] House [] Apartment/Condo []other  Stairs: [x] No  [] Yes:   Occupation: Patient works for [employer] - standing for most of the day when out in the field.  School/Work Restrictions: [x] none  Prior Level of Function: independent with ADL's  Current Level of Function: difficulty standing for long periods of time    Pain:  Current 8/10, worst 8/10, best 0/10   Location: left back  and buttocks  Description: Throbbing  Aggravating Factors: standing or sitting for long periods, difficulty sleeping on left side  Easing Factors: rest and over the counter medication    Dominant Extremity:    [x] Right    [] Left    Patients goals: decrease pain, improve mobility and function    Medical History:   Past Medical History:   Diagnosis Date    H/O hysterectomy for benign disease with BSO 2017    Morbid obesity     Varicose veins     Vitamin D deficiency        Surgical History:   Felicia Drew  has a past surgical history that includes Laparoscopic gastric banding;  section; Cholecystectomy; Tonsillectomy; Vascular surgery; victoriano filter placement; Gastric bypass; lap band removal ; Hysterectomy; Oophorectomy; victoriano filter removal; Esophagogastroduodenoscopy (N/A,  06/18/2020); Colonoscopy (N/A, 06/18/2020); and Colonoscopy (N/A, 4/11/2024).    Medications:   Felicia has a current medication list which includes the following prescription(s): acetaminophen, cholecalciferol (vitamin d3), cyanocobalamin (vitamin b-12), ferrous sulfate, multivitamin, nystatin, omeprazole, ondansetron, wegovy, tramadol, and triamcinolone acetonide 0.1%.    Allergies:   Review of patient's allergies indicates:   Allergen Reactions    Brompheniramin-phenylephrin-dm      Other reaction(s): Other (See Comments)  Per patient, caused dizziness + blurry vision     Erythromycin Rash    Erythromycin (bulk) Rash        OBJECTIVE     Posture:  Patient presents with postural abnormalities which include:    [x] Forward Head   [] Increased Lumbar Lordosis   [x] Rounded Shoulder   [] Flat Back Posture   [] Increased Thoracic Kyphosis [] Pes Planus   [] Increased Trunk Sway  [] Valgus knee position   [] Increased Trunk Rotation  [] Varus knee position   [] Increased cervical lordosis [] Other:    Sensation:    Sensation to light touch over UE's is  [x] Intact [] Impaired [] Defer  Sensation to light touch over LE's is  [x] Intact [] Impaired [] Defer    Reflexes:  Patellar    [x] Defer [] Normal [] Hyper []  Hypo   Achilles   [x] Defer [] Normal [] Hyper []  Hypo  Tricep   [x] Defer [] Normal [] Hyper []  Hypo  Brachioradialis [x] Defer [] Normal [] Hyper []  Hypo      Gait Analysis: The patient ambulated with the following assistive device: none; the patient presents with the following gait abnormalities: occasional unsteady gait and antalgic gait     Balance  Right   (seconds) Left  (seconds) Norms   Single Leg Stance ~5s ~5s Less than 4.9 sec high risk  5-6.4 sec increased risk  6.5 or greater low risk           Range of Motion:  Lumbar ROM Right  (spine) Left   Pain/Dysfunction with Movement   Lumbar Flexion (60º) WNL --- -   Lumbar Extension (30º) WNL --- -   Lumbar Side Bending (25º) 25 25 -   Lumbar  Rotation WNL 25% limited  + when rotating to the L     Strength:  L/E MMT Right  (spine) Left Pain/Dysfunction with Movement   Hip Flexion  4+/5 4+/5    Hip Extension  4+/5 4+/5    Hip Abduction  4+/5 4/5 Glute Med Testing in Side-Lying    Knee Extension 4+/5 4+/5    Knee Flexion 4+/5 4+/5    Hip IR 4+/5 4/5    Hip ER 4+/5 4/5    Ankle DF 5/5 5/5    Ankle PF 5/5 5/5    Ankle Inversion 5/5 5/5    Ankle Eversion 5/5 5/5      Special Tests:   Right  (spine) Left    Lumbar Distraction  [] Positive    [x] Negative ---   Lumbar Compression [] Positive    [x] Negative ---   SLUMP [] Positive    [] Negative [] Positive    [x] Negative   ANETA [] Positive    [x] Negative [] Positive    [x] Negative     Palpation: Moderate TTP noted along the L Piriformis Muscle as well as the L Glute Med. No TTP over the lumbar paraspinals    FOTO:  Intake Outcome Measure for FOTO Lumbar Spine Survey    Therapist reviewed FOTO scores for Felicia Drew on 10/14/2024.   FOTO documents entered into [employer] - see Media section or FOTO account episode details..    Intake Score: 69     TREATMENT     Total Treatment time (time-based codes) separate from Evaluation: (16) minutes     Jennifer received the treatments listed below:   Jennifer received therapeutic exercises to develop strength, endurance, ROM, and flexibility for 8 minutes including:    Supine Tibial Nerve Glides x5 reps B  Supine Piriformis [place]ocks x5 reps B (5s holds)  Standing Lumbar Flexion x5 reps  Seated Lumbar Flexion x5 reps  Seated Figure 4 stretch 5x10s (L only)    Jennifer participated in neuromuscular re-education activities to improve: Balance, Coordination, Kinesthetic, Sense, Proprioception, Posture, and Core Stability for 8 minutes. The following activities were included:    Supine Hip Bridge x5 reps  Side-Lying Clamshells x5 reps B  Side-Lying Reverse Clamshells x5 reps B    Plan for Next Visit: Dry Needling, hip adduction/abduction, DKTC, PPT      PATIENT EDUCATION AND HOME  EXERCISES     Education provided: (during treatment session) minutes  Home exercise program Administration and Review  Post Exercise Soreness  Anatomy/Physiology of the Lumbar Spine and the surrounding musculature    Written Home Exercises Provided: yes.  Exercises were reviewed and Jennifer was able to demonstrate them prior to the end of the session.  Jennifer demonstrated good  understanding of the education provided. See EMR under Patient Instructions for exercises provided during therapy sessions.    ASSESSMENT     Felicia is a 54 y.o. female referred to outpatient Physical Therapy with a medical diagnosis of Degeneration of intervertebral disc of lumbar region with discogenic back pain. The patient presents with signs and symptoms consistent with diagnosis and impairments which include weakness, impaired endurance, impaired self care skills, impaired functional mobility, gait instability, impaired balance, decreased lower extremity function, pain, decreased ROM, impaired joint extensibility, and impaired muscle length.      Patient prognosis is Good, if patient is consistent and compliant with Physical Therapy and home exercise program.  Patient will benefit from skilled outpatient Physical Therapy to address the deficits stated above and in the chart below, provide patient/family education, and to maximize patient's level of independence.     Plan of care discussed with patient: Yes  Patient's spiritual, cultural and educational needs considered and patient is agreeable to the plan of care and goals as stated below:     Anticipated Barriers for therapy: none      Medical Necessity is demonstrated by the following:  History  Co-morbidities and personal factors that may impact the plan of care [] LOW: no personal factors / co-morbidities  [x] MODERATE: 1-2 personal factors / co-morbidities  [] HIGH: 3+ personal factors / co-morbidities    Moderate / High Support Documentation:   Past Medical History:   Diagnosis Date     H/O hysterectomy for benign disease with BSO 2/2/2017    Morbid obesity     Varicose veins     Vitamin D deficiency          Examination  Body Structures and Functions, activity limitations and participation restrictions that may impact the plan of care [x] LOW: addressing 1-2 elements  [] MODERATE: 3+ elements  [] HIGH: 4+ elements (please support below)    Moderate / High Support Documentation: See evaluation / objective measurements above and FOTO.     Clinical Presentation [x] LOW: stable  [] MODERATE: Evolving  [] HIGH: Unstable     Decision Making/ Complexity Score: low         Goals:  Reviewed: 10/14/2024    Short Term Goals: In 4 weeks  Progress Date   1.Patient to be educated on HEP. [x] Progressing  [] MET   [] Not MET   [] Not tested    2.Patient to increase lumbar spine range of motion, especially into L sided rotatoin to WNL, in order to improve available range of motion for ADL's.  [x] Progressing  [] MET   [] Not MET  [] Not tested    3.Patient to increase hip abduction strength by 1/2 grade, in order to improve endurance and increase ability to perform all functional activities for increased time.  [x] Progressing  [] MET   [] Not MET  [] Not tested    4.Patient to have pain less than 5/10 at worst, to improve QOL. [x] Progressing  [] MET   [] Not MET  [] Not tested    5.Patient to improve score on the FOTO, to improve QOL. [x] Progressing  [] MET   [] Not MET  [] Not tested      Long Term Goals: In 8 weeks Progress Date   1.Patient to improve score on the FOTO predicted score or better, to improve QOL. [x] Progressing  [] MET   [] Not MET  [] Not tested    2. Patient to increase hip IR and ER strength to 4+/5 or greater, in order to improve endurance and increase ability to perform all functional activities for increased time. [x] Progressing  [] MET   [] Not MET  [] Not tested    3. Patient to have decreased pain to 3/10 at worst, to improve QOL. [x] Progressing  [] MET   [] Not MET  [] Not tested     5. Patient to perform daily activities including standing for greater than 1 hour without increased symptoms. [x] Progressing  [] MET   [] Not MET  [] Not tested      PLAN     Plan of care Certification: 10/14/2024  to 12/14/24.    Outpatient Physical Therapy 2 times weekly for 8 weeks to include any combination of the following interventions: Aquatic Therapy, virtual visits, electrical stimulation (PRN), Cervical/Lumbar Traction, Gait Training, Manual Therapy, Neuromuscular Re-ed, Patient Education/Self Care, Therapeutic Activites, Therapeutic Exercise, Dry Needling, and Moist Hot Pack/Cold Pack    This patient CAN be treated by a PTA.    Aleksandra Razo, PT, DPT, Cert. DN

## 2024-10-16 NOTE — PROGRESS NOTES
[facility]   Physical Therapy Treatment Note      Name: Felicia Drew  Clinic Number: [id number]    Therapy Diagnosis:   Encounter Diagnoses   Name Primary?    Decreased functional activity tolerance Yes    Decreased ROM of lumbar spine     Decreased strength of lower extremity      Physician: Terrie Hartmann MD    Visit Date: 10/17/2024    Physician Orders: PT Eval and Treat  Medical Diagnosis from Referral: M51.360 (ICD-10-CM) - Degeneration of intervertebral disc of lumbar region with discogenic back pain   Evaluation Date: 10/14/2024  Authorization Period Expiration: 12/31/24  Plan of Care Expiration: 12/14/2024  Progress Note Due: 11/14/2024  Visit # / Visits authorized: 1 / 20 (1/1 Eval)  FOTO: 1/3 (last performed on 10/14/2024)     Precautions: Standard and ANDREA     Time In: 7:05 AM  Time Out: 8:00 AM  Total Billable Time (timed & untimed codes): 55 minutes    PTA Visit #: 0/5     Subjective     Patient reports: that she had more discomfort after the initial evaluation. Was able to try the home exercises but nothing really stood out to her.    She was compliant with home exercise program.  Response to previous treatment: soreness and discomfort  Functional change: in progress - first follow up appointment    Pain: 7/10  Location: bilateral back      Objective      Objective Measures updated at progress report unless specified.     Treatment     Jennifer received the treatments listed below:      Jennifer received therapeutic exercises to develop strength, endurance, ROM, and flexibility for 15 minutes including:     Seated Stability Ball Roll Outs x20 reps  Supine Tibial Nerve Glides 2x10 reps B  Supine Piriformis [place] x10 reps B (5s holds)  Standing Lumbar Flexion x5 reps  Seated Figure 4 stretch 5x10s B     Jennifer participated in neuromuscular re-education activities to improve: Balance, Coordination, Kinesthetic, Sense, Proprioception, Posture, and Core Stability for 15 minutes. The  following activities were included:     Supine Posterior Pelvic Tilt x20 reps (3s holds)  Supine Hip Bridge 2x10 reps  Side-Lying Clamshells 2x10 reps reps B  Side-Lying Reverse Clamshells 2x10 reps B     Plan for Next Visit: Dry Needling, hip adduction/abduction, DKTC, PPT       Pt received Manual Therapy techniques in the form of Dry Needling for x25 min. This was applied to the LeftLumbar Paraspinals at L4-5 and along the superior glutes of the L. Dry needling was performed to decrease inflammation, increase circulation, decrease pain and restore homeostasis.      (2) 50 mm needles with 30 mm gauge were used for all insertion points along the Lumbar Paraspinals.  (4)75 mm needles with 30 mm gauge were used for all insertion points along the L superior glutes.    Electrical Stimulation was applied this date while needles were in situ x8 minutes. Intensity increased to patient tolerance. No adverse reactions noted.    Patient gave Written and Verbal consent to undergo dry needling. Written consent can be found in the media section in pts chart. All needles were removed and changes in signs and symptoms were assessed. No adverse reactions noted at the conclusion of the treatment.     Patient received a hot pack to the lumbar spine post treatment session x5 minutes to assist with improved mobility and reduced pain as well as increased blood flow.    *A portion of this treatment session is provided with the assistance of a skilled rehbailitation technician under the supervision of a licensed physical therapist.    Patient Education and Home Exercises       Education provided:   Home exercise program Review  Post Exercise Soreness  Anatomy/Physiology of the Lumbar Spine and the surrounding musculature  DN Consent    Written Home Exercises Provided: Pt instructed to continue prior HEP. Exercises were reviewed and Jennifer was able to demonstrate them prior to the end of the session.  Jennifer demonstrated good  understanding of  the education provided. See Electronic Medical Record under Patient Instructions for exercises provided during therapy sessions    Assessment     Dry needling was introduced this date and applied to the lower left lumbar paraspinals as well as the left superior glutes to assist with pain relief and tissue extensibility across the region. Skin was cleaned pre and post needle insertion/removal with no adverse reactions observed. Encouraged patient to continue with Home Exercise Program for continued improvements outside the clinical setting.    Jennifer Is progressing well towards her goals.   Patient prognosis is Good.     Patient will continue to benefit from skilled outpatient physical therapy to address the deficits listed in the problem list box on initial evaluation, provide pt/family education and to maximize pt's level of independence in the home and community environment.     Patient's spiritual, cultural and educational needs considered and pt agreeable to plan of care and goals.     Anticipated barriers to physical therapy: none    Goals:   Reviewed: 10/14/2024    Short Term Goals: In 4 weeks  Progress Date   1.Patient to be educated on HEP. [x] Progressing  [] MET   [] Not MET   [] Not tested     2.Patient to increase lumbar spine range of motion, especially into L sided rotatoin to WNL, in order to improve available range of motion for ADL's.  [x] Progressing  [] MET   [] Not MET  [] Not tested     3.Patient to increase hip abduction strength by 1/2 grade, in order to improve endurance and increase ability to perform all functional activities for increased time.  [x] Progressing  [] MET   [] Not MET  [] Not tested     4.Patient to have pain less than 5/10 at worst, to improve QOL. [x] Progressing  [] MET   [] Not MET  [] Not tested     5.Patient to improve score on the FOTO, to improve QOL. [x] Progressing  [] MET   [] Not MET  [] Not tested        Long Term Goals: In 8 weeks Progress Date   1.Patient to  improve score on the FOTO predicted score or better, to improve QOL. [x] Progressing  [] MET   [] Not MET  [] Not tested     2. Patient to increase hip IR and ER strength to 4+/5 or greater, in order to improve endurance and increase ability to perform all functional activities for increased time. [x] Progressing  [] MET   [] Not MET  [] Not tested     3. Patient to have decreased pain to 3/10 at worst, to improve QOL. [x] Progressing  [] MET   [] Not MET  [] Not tested     5. Patient to perform daily activities including standing for greater than 1 hour without increased symptoms. [x] Progressing  [] MET   [] Not MET  [] Not tested        Plan     Continue with established POC for improved ability to perform ADL's.    Plan of care Certification: 10/14/2024  to 12/14/24.     Outpatient Physical Therapy 2 times weekly for 8 weeks to include any combination of the following interventions: Aquatic Therapy, virtual visits, electrical stimulation (PRN), Cervical/Lumbar Traction, Gait Training, Manual Therapy, Neuromuscular Re-ed, Patient Education/Self Care, Therapeutic Activites, Therapeutic Exercise, Dry Needling, and Moist Hot Pack/Cold Pack    Aleksandra Razo, PT, DPT, Cert. DN

## 2024-10-17 ENCOUNTER — CLINICAL SUPPORT (OUTPATIENT)
Dept: REHABILITATION | Facility: HOSPITAL | Age: 54
End: 2024-10-17
Payer: COMMERCIAL

## 2024-10-17 DIAGNOSIS — R68.89 DECREASED FUNCTIONAL ACTIVITY TOLERANCE: Primary | ICD-10-CM

## 2024-10-17 DIAGNOSIS — M53.86 DECREASED ROM OF LUMBAR SPINE: ICD-10-CM

## 2024-10-17 DIAGNOSIS — R29.898 DECREASED STRENGTH OF LOWER EXTREMITY: ICD-10-CM

## 2024-10-17 PROCEDURE — 97110 THERAPEUTIC EXERCISES: CPT | Mod: PN

## 2024-10-17 PROCEDURE — 97112 NEUROMUSCULAR REEDUCATION: CPT | Mod: PN

## 2024-10-17 PROCEDURE — 97140 MANUAL THERAPY 1/> REGIONS: CPT | Mod: PN

## 2024-10-17 PROCEDURE — 97014 ELECTRIC STIMULATION THERAPY: CPT | Mod: PN

## 2024-10-17 NOTE — PROGRESS NOTES
[facility]   Physical Therapy Treatment Note      Name: Felicia Drew  Clinic Number: [id number]    Therapy Diagnosis:   Encounter Diagnoses   Name Primary?    Decreased functional activity tolerance Yes    Decreased ROM of lumbar spine     Decreased strength of lower extremity      Physician: Terrie Hartmann MD    Visit Date: 10/21/2024    Physician Orders: PT Eval and Treat  Medical Diagnosis from Referral: M51.360 (ICD-10-CM) - Degeneration of intervertebral disc of lumbar region with discogenic back pain   Evaluation Date: 10/14/2024  Authorization Period Expiration: 12/31/24  Plan of Care Expiration: 12/14/2024  Progress Note Due: 11/14/2024  Visit # / Visits authorized: 2 / 20 (1/1 Eval)  FOTO: 1/3 (last performed on 10/14/2024)     Precautions: Standard and ANDREA     Time In: 7:02 AM  Time Out: 7:57 AM  Total Billable Time (timed & untimed codes): 55 minutes    PTA Visit #: 0/5     Subjective     Patient reports: that she had several days of relief from the dry needling. Started having a little pain in the left hip yesterday and this morning.    She was compliant with home exercise program.  Response to previous treatment: no adverse reactions  Functional change: in progress    Pain: 6/10  Location: bilateral back      Objective      Objective Measures updated at progress report unless specified.     Treatment     Jennifer received the treatments listed below:      Jennifer received therapeutic exercises to develop strength, endurance, ROM, and flexibility for 15 minutes including:     Seated Stability Ball Roll Outs x20 reps  Seated Figure 4 stretch 5x10s L  Supine DKTC x20 reps + Stability Ball   Supine LTR over Stability Ball x20 reps     Jennifer participated in neuromuscular re-education activities to improve: Balance, Coordination, Kinesthetic, Sense, Proprioception, Posture, and Core Stability for 15 minutes. The following activities were included:     Supine Posterior Pelvic Tilt x30  reps (3s holds)  Supine Hip Bridge 3x10 reps  Side-Lying Clamshells 2x10 reps reps B  Side-Lying Reverse Clamshells 2x10 reps B     Plan for Next Visit: Dry Needling, hip adduction/abduction, PPT       Pt received Manual Therapy techniques in the form of Dry Needling for x25 min. This was applied to the LeftLumbar Paraspinals at L4-5 and along the superior glutes of the L. Dry needling was performed to decrease inflammation, increase circulation, decrease pain and restore homeostasis.      (2) 50 mm needles with 30 mm gauge were used for all insertion points along the Lumbar Paraspinals.  (6)75 mm needles with 30 mm gauge were used for all insertion points along the L superior glutes.    Electrical Stimulation was applied this date while needles were in situ x8 minutes. Intensity increased to patient tolerance. No adverse reactions noted.    Patient gave Written and Verbal consent to undergo dry needling. Written consent can be found in the media section in pts chart. All needles were removed and changes in signs and symptoms were assessed. No adverse reactions noted at the conclusion of the treatment.     Patient received a hot pack to the lumbar spine post treatment session x5 minutes to assist with improved mobility and reduced pain as well as increased blood flow.    *A portion of this treatment session is provided with the assistance of a skilled rehbailitation technician under the supervision of a licensed physical therapist.    Patient Education and Home Exercises       Education provided:   Home exercise program Review  Post Exercise Soreness  Anatomy/Physiology of the Lumbar Spine and the surrounding musculature    Written Home Exercises Provided: Pt instructed to continue prior HEP. Exercises were reviewed and Jennifer was able to demonstrate them prior to the end of the session.  Jennifer demonstrated good  understanding of the education provided. See Electronic Medical Record under Patient Instructions for  exercises provided during therapy sessions    Assessment     Dry needling was utilized again this date due to positive response from previous session. Skin was cleaned pre and post needle insertion/removal with no adverse reactions observed. Able to progress reps and or intensity of some exercises without complication. Encouraged patient to continue with Home Exercise Program daily.    Jennifer Is progressing well towards her goals.   Patient prognosis is Good.     Patient will continue to benefit from skilled outpatient physical therapy to address the deficits listed in the problem list box on initial evaluation, provide pt/family education and to maximize pt's level of independence in the home and community environment.     Patient's spiritual, cultural and educational needs considered and pt agreeable to plan of care and goals.     Anticipated barriers to physical therapy: none    Goals:   Reviewed: 10/14/2024    Short Term Goals: In 4 weeks  Progress Date   1.Patient to be educated on HEP. [x] Progressing  [] MET   [] Not MET   [] Not tested     2.Patient to increase lumbar spine range of motion, especially into L sided rotatoin to WNL, in order to improve available range of motion for ADL's.  [x] Progressing  [] MET   [] Not MET  [] Not tested     3.Patient to increase hip abduction strength by 1/2 grade, in order to improve endurance and increase ability to perform all functional activities for increased time.  [x] Progressing  [] MET   [] Not MET  [] Not tested     4.Patient to have pain less than 5/10 at worst, to improve QOL. [x] Progressing  [] MET   [] Not MET  [] Not tested     5.Patient to improve score on the FOTO, to improve QOL. [x] Progressing  [] MET   [] Not MET  [] Not tested        Long Term Goals: In 8 weeks Progress Date   1.Patient to improve score on the FOTO predicted score or better, to improve QOL. [x] Progressing  [] MET   [] Not MET  [] Not tested     2. Patient to increase hip IR and ER  strength to 4+/5 or greater, in order to improve endurance and increase ability to perform all functional activities for increased time. [x] Progressing  [] MET   [] Not MET  [] Not tested     3. Patient to have decreased pain to 3/10 at worst, to improve QOL. [x] Progressing  [] MET   [] Not MET  [] Not tested     5. Patient to perform daily activities including standing for greater than 1 hour without increased symptoms. [x] Progressing  [] MET   [] Not MET  [] Not tested        Plan     Continue with established POC for improved ability to perform ADL's.    Plan of care Certification: 10/14/2024  to 12/14/24.     Outpatient Physical Therapy 2 times weekly for 8 weeks to include any combination of the following interventions: Aquatic Therapy, virtual visits, electrical stimulation (PRN), Cervical/Lumbar Traction, Gait Training, Manual Therapy, Neuromuscular Re-ed, Patient Education/Self Care, Therapeutic Activites, Therapeutic Exercise, Dry Needling, and Moist Hot Pack/Cold Pack    Aleksandra Razo, PT, DPT, Cert. DN

## 2024-10-21 ENCOUNTER — CLINICAL SUPPORT (OUTPATIENT)
Dept: REHABILITATION | Facility: HOSPITAL | Age: 54
End: 2024-10-21
Payer: COMMERCIAL

## 2024-10-21 DIAGNOSIS — R68.89 DECREASED FUNCTIONAL ACTIVITY TOLERANCE: Primary | ICD-10-CM

## 2024-10-21 DIAGNOSIS — M53.86 DECREASED ROM OF LUMBAR SPINE: ICD-10-CM

## 2024-10-21 DIAGNOSIS — R29.898 DECREASED STRENGTH OF LOWER EXTREMITY: ICD-10-CM

## 2024-10-21 PROCEDURE — 97112 NEUROMUSCULAR REEDUCATION: CPT | Mod: PN

## 2024-10-21 PROCEDURE — 97110 THERAPEUTIC EXERCISES: CPT | Mod: PN

## 2024-10-21 PROCEDURE — 97014 ELECTRIC STIMULATION THERAPY: CPT | Mod: PN

## 2024-10-21 PROCEDURE — 97140 MANUAL THERAPY 1/> REGIONS: CPT | Mod: PN

## 2024-10-23 ENCOUNTER — OFFICE VISIT (OUTPATIENT)
Dept: BARIATRICS | Facility: CLINIC | Age: 54
End: 2024-10-23
Payer: COMMERCIAL

## 2024-10-23 VITALS
SYSTOLIC BLOOD PRESSURE: 137 MMHG | HEIGHT: 62 IN | DIASTOLIC BLOOD PRESSURE: 81 MMHG | OXYGEN SATURATION: 96 % | HEART RATE: 65 BPM | BODY MASS INDEX: 46.68 KG/M2 | WEIGHT: 253.69 LBS

## 2024-10-23 DIAGNOSIS — Z98.84 S/P BARIATRIC SURGERY: ICD-10-CM

## 2024-10-23 DIAGNOSIS — E66.01 CLASS 3 SEVERE OBESITY DUE TO EXCESS CALORIES WITH SERIOUS COMORBIDITY AND BODY MASS INDEX (BMI) OF 45.0 TO 49.9 IN ADULT: Primary | ICD-10-CM

## 2024-10-23 DIAGNOSIS — E66.813 CLASS 3 SEVERE OBESITY DUE TO EXCESS CALORIES WITH SERIOUS COMORBIDITY AND BODY MASS INDEX (BMI) OF 45.0 TO 49.9 IN ADULT: Primary | ICD-10-CM

## 2024-10-23 DIAGNOSIS — Z71.3 ENCOUNTER FOR WEIGHT LOSS COUNSELING: ICD-10-CM

## 2024-10-23 PROCEDURE — 99213 OFFICE O/P EST LOW 20 MIN: CPT | Mod: S$GLB,,, | Performed by: STUDENT IN AN ORGANIZED HEALTH CARE EDUCATION/TRAINING PROGRAM

## 2024-10-23 PROCEDURE — 99999 PR PBB SHADOW E&M-EST. PATIENT-LVL IV: CPT | Mod: PBBFAC,,, | Performed by: STUDENT IN AN ORGANIZED HEALTH CARE EDUCATION/TRAINING PROGRAM

## 2024-10-23 RX ORDER — TIRZEPATIDE 5 MG/.5ML
5 INJECTION, SOLUTION SUBCUTANEOUS
Qty: 4 PEN | Refills: 0 | Status: SHIPPED | OUTPATIENT
Start: 2024-11-20

## 2024-10-23 NOTE — PROGRESS NOTES
Subjective:       Patient ID: Felicia Drew is a 54 y.o. female.    Chief Complaint: Obesity, Follow-up, and Weight Check    Patient presents for treatment of obesity.     Lap Band in 2011, band removal and RNY in 2015  220 lbs after surgery  Gained 48 lbs in past year  Emotional eater, building house so living with inlaws    No glaucoma  No kidney stones  No pancreatitis  No thyroid cancer    No prior use of weight loss medication    Physical Activity  No current exercise routine    Current Eating Habits  Premier protein shakes, especially when on the road  Nut and cheese packs  About 32 oz of water daily      Medical Weight Loss History  3/17/2021: 283.4, BMI 51.8, BFP 54.9%, SMM 68.6 lbs; topiramate 25 mg twice daily - would often forget second pill  5/10/2021: 279.4 lbs, BMI 51.1, BFP 55%, SMM 67.9 lbs; topiramate 50 mg daily  6/15/2021: 277.7 lbs, BMI 50.8, BFP 55.4%, SMM 67.2 lbs, BMR 1585 kcal; phentermine  10/4/2023: 299 lbs, BMI 54.7, BFP 55.9%,  lbs, SMM 71.7 lbs, BMR 1663 kcal  2/8/2024: 278.4 lbs, BMI 50.9, BFP 55.2%, .6 lbs, SMM 67.5 lbs, BMR 1592 kcal  5/8/2024: 268.3 lbs, BMI 49.1, BFP 54.6%, .6 lbs, SMM 65.5 lbs, BMR 1563 kcal  10/23/2024: 253.7 lbs, BMI 46.4, BFP 54.7%, .8 lbs, SMM 62.4 lbs, BMR 1495 kcal          Review of Systems   Constitutional:  Negative for chills and fever.   Respiratory:  Positive for shortness of breath (with exertion).    Cardiovascular:  Positive for leg swelling. Negative for chest pain and palpitations.   Gastrointestinal:  Negative for abdominal pain, nausea and vomiting.   Neurological:  Negative for dizziness and light-headedness.   Psychiatric/Behavioral:  The patient is not nervous/anxious.          Objective:        Latest Reference Range & Units 05/22/23 08:56   WBC 3.90 - 12.70 K/uL 5.96   RBC 4.00 - 5.40 M/uL 4.57   Hemoglobin 12.0 - 16.0 g/dL 12.9   Hematocrit 37.0 - 48.5 % 40.9   MCV 82 - 98 fL 90   MCH 27.0 - 31.0 pg 28.2    MCHC 32.0 - 36.0 g/dL 31.5 (L)   RDW 11.5 - 14.5 % 13.2   Platelet Count 150 - 450 K/uL 227   MPV 9.2 - 12.9 fL 11.9   Iron 30 - 160 ug/dL 92   TIBC 250 - 450 ug/dL 485 (H)   Saturated Iron 20 - 50 % 19 (L)   Transferrin 200 - 375 mg/dL 328   Ferritin 20.0 - 300.0 ng/mL 22   Folate 4.0 - 24.0 ng/mL 10.9   Vitamin B-12 210 - 950 pg/mL 304   Methlymalonic Acid <0.40 umol/L 0.37   Sodium 136 - 145 mmol/L 142   Potassium 3.5 - 5.1 mmol/L 3.8   Chloride 95 - 110 mmol/L 106   CO2 23 - 29 mmol/L 25   Anion Gap 8 - 16 mmol/L 11   BUN 6 - 20 mg/dL 13   Creatinine 0.5 - 1.4 mg/dL 0.7   eGFR >60 mL/min/1.73 m^2 >60.0   Glucose 70 - 110 mg/dL 80   Calcium 8.7 - 10.5 mg/dL 9.4   ALP 55 - 135 U/L 87   PROTEIN TOTAL 6.0 - 8.4 g/dL 6.7   Albumin 3.5 - 5.2 g/dL 3.7   BILIRUBIN TOTAL 0.1 - 1.0 mg/dL 0.6   AST 10 - 40 U/L 16   ALT 10 - 44 U/L 17   Cholesterol Total 120 - 199 mg/dL 159   HDL 40 - 75 mg/dL 60   HDL/Cholesterol Ratio 20.0 - 50.0 % 37.7   LDL Cholesterol External 63.0 - 159.0 mg/dL 88.8   Non-HDL Cholesterol mg/dL 99   Total Cholesterol/HDL Ratio 2.0 - 5.0  2.7   Triglycerides 30 - 150 mg/dL 51   Copper 810 - 1990 ug/L 1355   Zinc, Serum-ALT 60 - 130 ug/dL 92   Thiamine 38 - 122 ug/L 63   Vit D, 25-Hydroxy 30 - 96 ng/mL 14 (L)   TSH 0.400 - 4.000 uIU/mL 1.159   PTH 9.0 - 77.0 pg/mL 101.1 (H)   (L): Data is abnormally low  (H): Data is abnormally high      Vitals:    10/23/24 1037   BP: 137/81   Pulse: 65     Physical Exam  Vitals reviewed.   Constitutional:       General: She is not in acute distress.     Appearance: Normal appearance. She is obese. She is not ill-appearing, toxic-appearing or diaphoretic.   HENT:      Head: Normocephalic and atraumatic.   Cardiovascular:      Rate and Rhythm: Normal rate.   Pulmonary:      Effort: Pulmonary effort is normal. No respiratory distress.   Skin:     General: Skin is warm and dry.   Neurological:      Mental Status: She is alert and oriented to person, place, and time.          Assessment:       1. Class 3 severe obesity due to excess calories with serious comorbidity and body mass index (BMI) of 45.0 to 49.9 in adult    2. S/P bariatric surgery    3. Encounter for weight loss counseling          Plan:   - Zepbound 5 mg weekly. If not covered, then resume Wegovy 1 mg weekly     - Log all food and beverage intake with a daily calorie goal of 800-1000 calories per day; high protein, low fat, low carbohydrate    - Moderate intensity aerobic exercise for 30  Minutes 3-4x/week

## 2024-10-25 DIAGNOSIS — Z71.3 ENCOUNTER FOR WEIGHT LOSS COUNSELING: ICD-10-CM

## 2024-10-25 DIAGNOSIS — E66.813 CLASS 3 SEVERE OBESITY DUE TO EXCESS CALORIES WITH SERIOUS COMORBIDITY AND BODY MASS INDEX (BMI) OF 45.0 TO 49.9 IN ADULT: ICD-10-CM

## 2024-10-25 DIAGNOSIS — Z98.84 S/P BARIATRIC SURGERY: ICD-10-CM

## 2024-10-25 DIAGNOSIS — E66.01 CLASS 3 SEVERE OBESITY DUE TO EXCESS CALORIES WITH SERIOUS COMORBIDITY AND BODY MASS INDEX (BMI) OF 45.0 TO 49.9 IN ADULT: ICD-10-CM

## 2024-10-28 ENCOUNTER — CLINICAL SUPPORT (OUTPATIENT)
Dept: REHABILITATION | Facility: HOSPITAL | Age: 54
End: 2024-10-28
Payer: COMMERCIAL

## 2024-10-28 DIAGNOSIS — M53.86 DECREASED ROM OF LUMBAR SPINE: ICD-10-CM

## 2024-10-28 DIAGNOSIS — R29.898 DECREASED STRENGTH OF LOWER EXTREMITY: ICD-10-CM

## 2024-10-28 DIAGNOSIS — R68.89 DECREASED FUNCTIONAL ACTIVITY TOLERANCE: Primary | ICD-10-CM

## 2024-10-28 PROCEDURE — 97110 THERAPEUTIC EXERCISES: CPT | Mod: PN

## 2024-10-28 PROCEDURE — 97112 NEUROMUSCULAR REEDUCATION: CPT | Mod: PN

## 2024-10-30 RX ORDER — TIRZEPATIDE 5 MG/.5ML
INJECTION, SOLUTION SUBCUTANEOUS
Qty: 4 PEN | Refills: 0 | Status: SHIPPED | OUTPATIENT
Start: 2024-10-30

## 2024-10-31 ENCOUNTER — CLINICAL SUPPORT (OUTPATIENT)
Dept: REHABILITATION | Facility: HOSPITAL | Age: 54
End: 2024-10-31
Payer: COMMERCIAL

## 2024-10-31 ENCOUNTER — PATIENT MESSAGE (OUTPATIENT)
Dept: BARIATRICS | Facility: CLINIC | Age: 54
End: 2024-10-31
Payer: COMMERCIAL

## 2024-10-31 DIAGNOSIS — R29.898 DECREASED STRENGTH OF LOWER EXTREMITY: ICD-10-CM

## 2024-10-31 DIAGNOSIS — R68.89 DECREASED FUNCTIONAL ACTIVITY TOLERANCE: Primary | ICD-10-CM

## 2024-10-31 DIAGNOSIS — M53.86 DECREASED ROM OF LUMBAR SPINE: ICD-10-CM

## 2024-10-31 PROCEDURE — 97014 ELECTRIC STIMULATION THERAPY: CPT | Mod: PN

## 2024-10-31 PROCEDURE — 97140 MANUAL THERAPY 1/> REGIONS: CPT | Mod: PN

## 2024-11-05 ENCOUNTER — PATIENT MESSAGE (OUTPATIENT)
Dept: HEMATOLOGY/ONCOLOGY | Facility: CLINIC | Age: 54
End: 2024-11-05
Payer: COMMERCIAL

## 2024-11-13 NOTE — PROGRESS NOTES
[facility]   Physical Therapy Treatment Note, Re-assessment, & Discharge Summary     Name: Felicia Drew  Clinic Number: [id number]    Therapy Diagnosis:   Encounter Diagnoses   Name Primary?    Decreased functional activity tolerance Yes    Decreased ROM of lumbar spine     Decreased strength of lower extremity      Physician: Terrie Hartmann MD    Visit Date: 11/14/2024    Physician Orders: PT Eval and Treat  Medical Diagnosis from Referral: M51.360 (ICD-10-CM) - Degeneration of intervertebral disc of lumbar region with discogenic back pain   Evaluation Date: 10/14/2024  Authorization Period Expiration: 12/31/24  Plan of Care Expiration: 12/14/2024  Progress Note Due: 12/14/2024  Visit # / Visits authorized: 5 / 20 (1/1 Eval) (re-assessed on 11/14/24)  FOTO: 2/3 (last performed on 10/14/2024, 11/14/24)     Precautions: Standard and ANDREA     Time In: 7:00 AM  Time Out: 7:37 AM  Total Billable Time (timed & untimed codes): 37 minutes    PTA Visit #: 0/5     Date of Last visit: 11/14/24  Total Visits Received: 6    Subjective     Patient reports: that she feels like things are basically still the same. She has good days and bad days. Today will be her last day of therapy.    She was compliant with home exercise program.  Response to previous treatment: no adverse reactions  Functional change: in progress    Pain: 6/10  Location: left lower back    Objective      Objective Measures updated at progress report unless specified.     Posture:  Patient presents with postural abnormalities which include:               [x] Forward Head                                [] Increased Lumbar Lordosis              [x] Rounded Shoulder                         [] Flat Back Posture              [] Increased Thoracic Kyphosis        [] Pes Planus              [] Increased Trunk Sway                   [] Valgus knee position              [] Increased Trunk Rotation              [] Varus knee position               [] Increased cervical lordosis            [] Other:     Sensation:    Sensation to light touch over UE's is  [x] Intact [] Impaired [] Defer  Sensation to light touch over LE's is  [x] Intact [] Impaired [] Defer     Reflexes:  Patellar                        [x] Defer [] Normal [] Hyper []  Hypo   Achilles                        [x] Defer [] Normal [] Hyper []  Hypo  Tricep                          [x] Defer [] Normal [] Hyper []  Hypo  Brachioradialis            [x] Defer [] Normal [] Hyper []  Hypo        Gait Analysis: The patient ambulated with the following assistive device: none; the patient presents with the following gait abnormalities: occasional unsteady gait and antalgic gait      Balance  Right   (seconds) Left  (seconds) Norms   Single Leg Stance ~5s ~5s Less than 4.9 sec high risk  5-6.4 sec increased risk  6.5 or greater low risk                Range of Motion:  Lumbar ROM Right  (spine) Left    Pain/Dysfunction with Movement   Lumbar Flexion (60º) WNL --- -   Lumbar Extension (30º) WNL --- -   Lumbar Side Bending (25º) 25 25 -   Lumbar Rotation WNL 25% limited  + when rotating to the L      Strength:  L/E MMT Right  (spine) Left Pain/Dysfunction with Movement   Hip Flexion  4+/5 4+/5     Hip Extension  4+/5 4+/5     Hip Abduction  4+/5 4+/5 Glute Med Testing in Side-Lying    Knee Extension 4+/5 4+/5     Knee Flexion 4+/5 4+/5     Hip IR 4+/5 4+/5     Hip ER 4+/5 4+/5     Ankle DF 5/5 5/5     Ankle PF 5/5 5/5     Ankle Inversion 5/5 5/5     Ankle Eversion 5/5 5/5        Special Tests:    Right  (spine) Left    Lumbar Distraction  [] Positive    [x] Negative ---   Lumbar Compression [] Positive    [x] Negative ---   SLUMP [] Positive    [] Negative [] Positive    [x] Negative   ANETA [] Positive    [x] Negative [] Positive    [x] Negative      Palpation: Moderate TTP noted along the L Piriformis Muscle as well as the L Glute Med. No TTP over the lumbar paraspinals     FOTO:  Intake Outcome  Measure for FOTO Lumbar Spine Survey     Therapist reviewed FOTO scores for Felicia Drew on 11/14/2024.   FOTO documents entered into [employer] - see Media section or FOTO account episode details..     Intake Score: 59     Treatment     Jennifer received the treatments listed below:      Jennifer received therapeutic exercises to develop strength, endurance, ROM, and flexibility for 12 minutes including:  Re-assessment     Plan for Next Visit: Dry Needling       Pt received Manual Therapy techniques in the form of Dry Needling for x25 min. This was applied to the LeftLumbar Paraspinals at L4-5 and along the superior glutes of the L. Dry needling was performed to decrease inflammation, increase circulation, decrease pain and restore homeostasis.      (4) 50 mm needles with 30 mm gauge were used for all insertion points along the Lumbar Paraspinals.  (4) 75 mm needles with 30 mm gauge were used for all insertion points along the L superior glutes.    Electrical Stimulation was applied this date while needles were in situ x10 minutes. Intensity increased to patient tolerance. No adverse reactions noted.    Patient gave Written and Verbal consent to undergo dry needling. Written consent can be found in the media section in pts chart. All needles were removed and changes in signs and symptoms were assessed. No adverse reactions noted at the conclusion of the treatment.     Patient received a hot pack to the lumbar spine post treatment session x8 minutes to assist with improved mobility and reduced pain as well as increased blood flow.    *A portion of this treatment session is provided with the assistance of a skilled rehbailitation technician under the supervision of a licensed physical therapist.    Patient Education and Home Exercises       Education provided:   Home exercise program Review  Post Exercise Soreness  Anatomy/Physiology of the Lumbar Spine and the surrounding musculature    Written Home Exercises Provided: Pt  instructed to continue prior HEP. Exercises were reviewed and Jennifer was able to demonstrate them prior to the end of the session.  Jennifer demonstrated good  understanding of the education provided. See Electronic Medical Record under Patient Instructions for exercises provided during therapy sessions    Assessment     Dry needling was utilized today to assist with pain relief and tissue extensibility. Skin was cleaned pre and post needle insertion/removal with no adverse reactions observed. Patient continues to report symptoms that are relatively unchanged since beginning therapy. It is recommended that the patient return to referring provider for alternative treatment options. The patient is now discharged from skilled outpatient PT services.    Discharge reason: Patient has reached the maximum rehab potential for the present time    Discharge FOTO Score: 59    Jennifer Is progressing well towards her goals.   Patient prognosis is Good.     Patient will continue to benefit from skilled outpatient physical therapy to address the deficits listed in the problem list box on initial evaluation, provide pt/family education and to maximize pt's level of independence in the home and community environment.     Patient's spiritual, cultural and educational needs considered and pt agreeable to plan of care and goals.     Anticipated barriers to physical therapy: none    Goals:   Reviewed: 10/14/2024    Short Term Goals: In 4 weeks  Progress Date   1.Patient to be educated on HEP. [] Progressing  [x] MET   [] Not MET   [] Not tested  11/14/24   2.Patient to increase lumbar spine range of motion, especially into L sided rotatoin to WNL, in order to improve available range of motion for ADL's.  [] Progressing  [] MET   [x] Not MET  [] Not tested     3.Patient to increase hip abduction strength by 1/2 grade, in order to improve endurance and increase ability to perform all functional activities for increased time.  [] Progressing  [x] MET    [] Not MET  [] Not tested  11/14/24   4.Patient to have pain less than 5/10 at worst, to improve QOL. [] Progressing  [] MET   [x] Not MET  [] Not tested     5.Patient to improve score on the FOTO, to improve QOL. [] Progressing  [] MET   [x] Not MET  [] Not tested        Long Term Goals: In 8 weeks Progress Date   1.Patient to improve score on the FOTO predicted score or better, to improve QOL. [] Progressing  [] MET   [x] Not MET  [] Not tested     2. Patient to increase hip IR and ER strength to 4+/5 or greater, in order to improve endurance and increase ability to perform all functional activities for increased time. [] Progressing  [] MET   [x] Not MET  [] Not tested     3. Patient to have decreased pain to 3/10 at worst, to improve QOL. [] Progressing  [] MET   [x] Not MET  [] Not tested     5. Patient to perform daily activities including standing for greater than 1 hour without increased symptoms. [] Progressing  [] MET   [x] Not MET  [] Not tested        Plan     This patient is discharged from skilled outpatient Physical Therapy services.    Aleksandra Razo, PT, DPT, Cert. DN

## 2024-11-14 ENCOUNTER — CLINICAL SUPPORT (OUTPATIENT)
Dept: REHABILITATION | Facility: HOSPITAL | Age: 54
End: 2024-11-14
Payer: COMMERCIAL

## 2024-11-14 DIAGNOSIS — M53.86 DECREASED ROM OF LUMBAR SPINE: ICD-10-CM

## 2024-11-14 DIAGNOSIS — R68.89 DECREASED FUNCTIONAL ACTIVITY TOLERANCE: Primary | ICD-10-CM

## 2024-11-14 DIAGNOSIS — R29.898 DECREASED STRENGTH OF LOWER EXTREMITY: ICD-10-CM

## 2024-11-14 PROCEDURE — 20561 NDL INSJ W/O NJX 3+ MUSC: CPT | Mod: PN

## 2024-11-14 PROCEDURE — 97014 ELECTRIC STIMULATION THERAPY: CPT | Mod: PN

## 2024-11-14 PROCEDURE — 97140 MANUAL THERAPY 1/> REGIONS: CPT | Mod: PN

## 2024-11-14 PROCEDURE — 97110 THERAPEUTIC EXERCISES: CPT | Mod: PN

## 2024-11-14 NOTE — PLAN OF CARE
[facility]   Physical Therapy Treatment Note, Re-assessment, & Discharge Summary     Name: Felicia Drew  Clinic Number: [id number]    Therapy Diagnosis:   Encounter Diagnoses   Name Primary?    Decreased functional activity tolerance Yes    Decreased ROM of lumbar spine     Decreased strength of lower extremity      Physician: Terrie Hartmann MD    Visit Date: 11/14/2024    Physician Orders: PT Eval and Treat  Medical Diagnosis from Referral: M51.360 (ICD-10-CM) - Degeneration of intervertebral disc of lumbar region with discogenic back pain   Evaluation Date: 10/14/2024  Authorization Period Expiration: 12/31/24  Plan of Care Expiration: 12/14/2024  Progress Note Due: 12/14/2024  Visit # / Visits authorized: 5 / 20 (1/1 Eval) (re-assessed on 11/14/24)  FOTO: 2/3 (last performed on 10/14/2024, 11/14/24)     Precautions: Standard and ANDREA     Time In: 7:00 AM  Time Out: 7:37 AM  Total Billable Time (timed & untimed codes): 37 minutes    PTA Visit #: 0/5     Date of Last visit: 11/14/24  Total Visits Received: 6    Subjective     Patient reports: that she feels like things are basically still the same. She has good days and bad days. Today will be her last day of therapy.    She was compliant with home exercise program.  Response to previous treatment: no adverse reactions  Functional change: in progress    Pain: 6/10  Location: left lower back    Objective      Objective Measures updated at progress report unless specified.     Posture:  Patient presents with postural abnormalities which include:               [x] Forward Head                                [] Increased Lumbar Lordosis              [x] Rounded Shoulder                         [] Flat Back Posture              [] Increased Thoracic Kyphosis        [] Pes Planus              [] Increased Trunk Sway                   [] Valgus knee position              [] Increased Trunk Rotation              [] Varus knee position               [] Increased cervical lordosis            [] Other:     Sensation:    Sensation to light touch over UE's is  [x] Intact [] Impaired [] Defer  Sensation to light touch over LE's is  [x] Intact [] Impaired [] Defer     Reflexes:  Patellar                        [x] Defer [] Normal [] Hyper []  Hypo   Achilles                        [x] Defer [] Normal [] Hyper []  Hypo  Tricep                          [x] Defer [] Normal [] Hyper []  Hypo  Brachioradialis            [x] Defer [] Normal [] Hyper []  Hypo        Gait Analysis: The patient ambulated with the following assistive device: none; the patient presents with the following gait abnormalities: occasional unsteady gait and antalgic gait      Balance  Right   (seconds) Left  (seconds) Norms   Single Leg Stance ~5s ~5s Less than 4.9 sec high risk  5-6.4 sec increased risk  6.5 or greater low risk                Range of Motion:  Lumbar ROM Right  (spine) Left    Pain/Dysfunction with Movement   Lumbar Flexion (60º) WNL --- -   Lumbar Extension (30º) WNL --- -   Lumbar Side Bending (25º) 25 25 -   Lumbar Rotation WNL 25% limited  + when rotating to the L      Strength:  L/E MMT Right  (spine) Left Pain/Dysfunction with Movement   Hip Flexion  4+/5 4+/5     Hip Extension  4+/5 4+/5     Hip Abduction  4+/5 4+/5 Glute Med Testing in Side-Lying    Knee Extension 4+/5 4+/5     Knee Flexion 4+/5 4+/5     Hip IR 4+/5 4+/5     Hip ER 4+/5 4+/5     Ankle DF 5/5 5/5     Ankle PF 5/5 5/5     Ankle Inversion 5/5 5/5     Ankle Eversion 5/5 5/5        Special Tests:    Right  (spine) Left    Lumbar Distraction  [] Positive    [x] Negative ---   Lumbar Compression [] Positive    [x] Negative ---   SLUMP [] Positive    [] Negative [] Positive    [x] Negative   ANETA [] Positive    [x] Negative [] Positive    [x] Negative      Palpation: Moderate TTP noted along the L Piriformis Muscle as well as the L Glute Med. No TTP over the lumbar paraspinals     FOTO:  Intake Outcome  Measure for FOTO Lumbar Spine Survey     Therapist reviewed FOTO scores for Felicia Drew on 11/14/2024.   FOTO documents entered into [employer] - see Media section or FOTO account episode details..     Intake Score: 59     Treatment     Jennifer received the treatments listed below:      Jennifer received therapeutic exercises to develop strength, endurance, ROM, and flexibility for 12 minutes including:  Re-assessment     Plan for Next Visit: Dry Needling       Pt received Manual Therapy techniques in the form of Dry Needling for x25 min. This was applied to the LeftLumbar Paraspinals at L4-5 and along the superior glutes of the L. Dry needling was performed to decrease inflammation, increase circulation, decrease pain and restore homeostasis.      (4) 50 mm needles with 30 mm gauge were used for all insertion points along the Lumbar Paraspinals.  (4) 75 mm needles with 30 mm gauge were used for all insertion points along the L superior glutes.    Electrical Stimulation was applied this date while needles were in situ x10 minutes. Intensity increased to patient tolerance. No adverse reactions noted.    Patient gave Written and Verbal consent to undergo dry needling. Written consent can be found in the media section in pts chart. All needles were removed and changes in signs and symptoms were assessed. No adverse reactions noted at the conclusion of the treatment.     Patient received a hot pack to the lumbar spine post treatment session x8 minutes to assist with improved mobility and reduced pain as well as increased blood flow.    *A portion of this treatment session is provided with the assistance of a skilled rehbailitation technician under the supervision of a licensed physical therapist.    Patient Education and Home Exercises       Education provided:   Home exercise program Review  Post Exercise Soreness  Anatomy/Physiology of the Lumbar Spine and the surrounding musculature    Written Home Exercises Provided: Pt  instructed to continue prior HEP. Exercises were reviewed and Jennifer was able to demonstrate them prior to the end of the session.  Jennifer demonstrated good  understanding of the education provided. See Electronic Medical Record under Patient Instructions for exercises provided during therapy sessions    Assessment     Dry needling was utilized today to assist with pain relief and tissue extensibility. Skin was cleaned pre and post needle insertion/removal with no adverse reactions observed. Patient continues to report symptoms that are relatively unchanged since beginning therapy. It is recommended that the patient return to referring provider for alternative treatment options. The patient is now discharged from skilled outpatient PT services.    Discharge reason: Patient has reached the maximum rehab potential for the present time    Discharge FOTO Score: 59    Jennifer Is progressing well towards her goals.   Patient prognosis is Good.     Patient will continue to benefit from skilled outpatient physical therapy to address the deficits listed in the problem list box on initial evaluation, provide pt/family education and to maximize pt's level of independence in the home and community environment.     Patient's spiritual, cultural and educational needs considered and pt agreeable to plan of care and goals.     Anticipated barriers to physical therapy: none    Goals:   Reviewed: 10/14/2024    Short Term Goals: In 4 weeks  Progress Date   1.Patient to be educated on HEP. [] Progressing  [x] MET   [] Not MET   [] Not tested  11/14/24   2.Patient to increase lumbar spine range of motion, especially into L sided rotatoin to WNL, in order to improve available range of motion for ADL's.  [] Progressing  [] MET   [x] Not MET  [] Not tested     3.Patient to increase hip abduction strength by 1/2 grade, in order to improve endurance and increase ability to perform all functional activities for increased time.  [] Progressing  [x] MET    [] Not MET  [] Not tested  11/14/24   4.Patient to have pain less than 5/10 at worst, to improve QOL. [] Progressing  [] MET   [x] Not MET  [] Not tested     5.Patient to improve score on the FOTO, to improve QOL. [] Progressing  [] MET   [x] Not MET  [] Not tested        Long Term Goals: In 8 weeks Progress Date   1.Patient to improve score on the FOTO predicted score or better, to improve QOL. [] Progressing  [] MET   [x] Not MET  [] Not tested     2. Patient to increase hip IR and ER strength to 4+/5 or greater, in order to improve endurance and increase ability to perform all functional activities for increased time. [] Progressing  [] MET   [x] Not MET  [] Not tested     3. Patient to have decreased pain to 3/10 at worst, to improve QOL. [] Progressing  [] MET   [x] Not MET  [] Not tested     5. Patient to perform daily activities including standing for greater than 1 hour without increased symptoms. [] Progressing  [] MET   [x] Not MET  [] Not tested        Plan     This patient is discharged from skilled outpatient Physical Therapy services.    Aleksandra Razo, PT, DPT, Cert. DN

## 2024-11-15 ENCOUNTER — TELEPHONE (OUTPATIENT)
Dept: HEMATOLOGY/ONCOLOGY | Facility: CLINIC | Age: 54
End: 2024-11-15
Payer: COMMERCIAL

## 2024-11-15 NOTE — TELEPHONE ENCOUNTER
Called and spoke to patient , trying to get her Genetics lab rescheduled for her. But she said she will have to think about this again, and maybe call us when she's ready. She's having second thoughts about all of this.

## 2024-11-18 ENCOUNTER — OFFICE VISIT (OUTPATIENT)
Dept: FAMILY MEDICINE | Facility: CLINIC | Age: 54
End: 2024-11-18
Payer: COMMERCIAL

## 2024-11-18 ENCOUNTER — PATIENT MESSAGE (OUTPATIENT)
Dept: BARIATRICS | Facility: CLINIC | Age: 54
End: 2024-11-18
Payer: COMMERCIAL

## 2024-11-18 VITALS
HEART RATE: 75 BPM | BODY MASS INDEX: 47.94 KG/M2 | DIASTOLIC BLOOD PRESSURE: 76 MMHG | TEMPERATURE: 98 F | WEIGHT: 260.5 LBS | HEIGHT: 62 IN | RESPIRATION RATE: 17 BRPM | SYSTOLIC BLOOD PRESSURE: 122 MMHG | OXYGEN SATURATION: 98 %

## 2024-11-18 DIAGNOSIS — E66.813 CLASS 3 SEVERE OBESITY DUE TO EXCESS CALORIES WITH SERIOUS COMORBIDITY AND BODY MASS INDEX (BMI) OF 45.0 TO 49.9 IN ADULT: Primary | ICD-10-CM

## 2024-11-18 DIAGNOSIS — U07.1 COVID-19: Primary | ICD-10-CM

## 2024-11-18 DIAGNOSIS — Z71.3 ENCOUNTER FOR WEIGHT LOSS COUNSELING: ICD-10-CM

## 2024-11-18 DIAGNOSIS — E66.01 CLASS 3 SEVERE OBESITY DUE TO EXCESS CALORIES WITH SERIOUS COMORBIDITY AND BODY MASS INDEX (BMI) OF 45.0 TO 49.9 IN ADULT: Primary | ICD-10-CM

## 2024-11-18 DIAGNOSIS — R05.1 ACUTE COUGH: ICD-10-CM

## 2024-11-18 DIAGNOSIS — Z98.84 S/P BARIATRIC SURGERY: ICD-10-CM

## 2024-11-18 LAB
CTP QC/QA: YES
SARS-COV-2 RDRP RESP QL NAA+PROBE: POSITIVE

## 2024-11-18 PROCEDURE — 99999 PR PBB SHADOW E&M-EST. PATIENT-LVL V: CPT | Mod: PBBFAC,,, | Performed by: NURSE PRACTITIONER

## 2024-11-18 RX ORDER — METHYLPREDNISOLONE 4 MG/1
TABLET ORAL
Qty: 21 TABLET | Refills: 0 | Status: SHIPPED | OUTPATIENT
Start: 2024-11-18

## 2024-11-18 RX ORDER — HYDROCODONE POLISTIREX AND CHLORPHENIRAMINE POLISTIREX 10; 8 MG/5ML; MG/5ML
5 SUSPENSION, EXTENDED RELEASE ORAL EVERY 12 HOURS PRN
Qty: 70 ML | Refills: 0 | Status: SHIPPED | OUTPATIENT
Start: 2024-11-18

## 2024-11-18 NOTE — PROGRESS NOTES
Assessment/Plan:  Problem List Items Addressed This Visit    None  Visit Diagnoses       COVID-19    -  Primary    Relevant Medications    methylPREDNISolone (MEDROL DOSEPACK) 4 mg tablet    Acute cough        Relevant Medications    hydrocodone-chlorpheniramine (TUSSIONEX) 10-8 mg/5 mL suspension    Other Relevant Orders    POCT COVID-19 Rapid Screening (Completed)        Covid positive   Start steroids as prescribed  Tussionex PRN for cough  Supportive care- rest, increase hydration with water, OTC Tylenol/Ibuprofen for pain/fever.  Recommend mucinex and flonase  Advised to quarantine for 5 full days and notify those you have tested positive for covid. Take precautions until day 10.   Follow up if symptoms worsen or fail to improve.  ER precautions for severe or worsening symptoms.     Ashley Matos NP  _____________________________________________________________________________________________________________________________________________________    CC: cough    HPI: Patient is a 54-year-old female who presents in clinic today as an established patient here for cough. She tested negative for COVID-19 on Friday but subsequently tested positive on Sunday (yesterday) on a home covid test. Symptoms are gradually worsening. She reports loss of taste, noting an inability to taste her dinner. She denies fever, chills. She experiences cough, congestion, runny nose, postnasal drip, and sore throat. There is no chest pain, SOB, wheezing, NVD. She uses cough drops constantly to manage throat discomfort and indicates a need for cough medicine. She reports Tussionex works really well for her.  reviewed. She denies taking any current medications except for cough drops. She reports no recent use of steroids. She denies being diabetic. No known sick contacts or recent travel.    Past Medical History:  Past Medical History:   Diagnosis Date    H/O hysterectomy for benign disease with BSO 2/2/2017    Morbid obesity      Varicose veins     Vitamin D deficiency      Past Surgical History:   Procedure Laterality Date     SECTION      CHOLECYSTECTOMY      COLONOSCOPY N/A 2020    Procedure: COLONOSCOPY;  Surgeon: Sean Ward MD;  [facility]  Service: Endoscopy;  Laterality: N/A;  screening colonooscpy, 49 years old.  siblings with colon polyps   - pt updated on visitor policy and drop off location.  COVID screening ordered for  at [facility] [place] - rb    COLONOSCOPY N/A 2024    Procedure: COLONOSCOPY;  Surgeon: Jhoan Mcdonald MD;  Location: [facility] ENDO;  Service: Gastroenterology;  Laterality: N/A;    ESOPHAGOGASTRODUODENOSCOPY N/A 2020    Procedure: ESOPHAGOGASTRODUODENOSCOPY (EGD);  Surgeon: Sean Ward MD;  [facility]  Service: Endoscopy;  Laterality: N/A;  BMI 51.77  Re-scheduled per md recommendations-new instructions mailed-tb    GASTRIC BYPASS      ZACK FILTER PLACEMENT      zack filter removal      HYSTERECTOMY      DANNA with BSO;    lap band removal       LAPAROSCOPIC GASTRIC BANDING      OOPHORECTOMY      w/hysteretomy @ age 41    TONSILLECTOMY      VASCULAR SURGERY       Review of patient's allergies indicates:   Allergen Reactions    Brompheniramin-phenylephrin-dm      Other reaction(s): Other (See Comments)  Per patient, caused dizziness + blurry vision     Erythromycin Rash    Erythromycin (bulk) Rash     Social History     Tobacco Use    Smoking status: Former     Current packs/day: 0.00     Types: Cigarettes     Start date: 1995     Quit date:      Years since quittin.3    Smokeless tobacco: Never   Substance Use Topics    Alcohol use: Yes     Alcohol/week: 1.0 standard drink of alcohol     Types: 1 Glasses of wine per week     Comment: socially    Drug use: No     Family History   Problem Relation Name Age of Onset    Aortic aneurysm Mother Pat     Diabetes Mother Pat     Lung cancer Father Srinivas 70 -  79        quit tobacco earlier    Hyperlipidemia Sister Anuja     Hypertension Sister Anuja     BAYLEE disease Sister Anuja     Rheum arthritis Sister Anuja     Aortic aneurysm Sister Anuja     Melanoma Brother Juarez     Stomach cancer Brother uJarez 69        stomach, liver, esophageal    COPD Brother Juarez         emphysema; quit tobacco;    Drug abuse Brother Juarez     Aortic aneurysm Brother Juarez     Dementia Brother Juarez         hx of hypoxia s/p aspiration PNA    Aortic aneurysm Brother Harshad         had repaired    Drug abuse Brother Harshad     Pancreatic cancer Maternal Grandfather  60 - 69    Stomach cancer Paternal Uncle  30 - 39    Stomach cancer Maternal Aunt  69     Current Outpatient Medications on File Prior to Visit   Medication Sig Dispense Refill    acetaminophen (TYLENOL) 500 MG tablet Take 1 tablet (500 mg total) by mouth every 6 (six) hours as needed for Pain.  0    cholecalciferol, vitamin D3, 1,250 mcg (50,000 unit) capsule Take 1 capsule (50,000 Units total) by mouth every 7 days. 12 capsule 3    cyanocobalamin, vitamin B-12, (VITAMIN B-12) 2,500 mcg Subl Place 1 tablet under the tongue once a week. 90 each 3    ferrous sulfate 325 (65 FE) MG EC tablet Take 1 tablet (325 mg total) by mouth once daily. 90 tablet 3    multivitamin capsule Take 3 capsules by mouth 3 (three) times daily.      omeprazole (PRILOSEC) 40 MG capsule Take 1 capsule (40 mg total) by mouth every morning. 180 capsule 0    ondansetron (ZOFRAN-ODT) 4 MG TbDL Take 2 tablets (8 mg total) by mouth every 8 (eight) hours as needed (nausea). 30 tablet 0    tirzepatide, weight loss, (ZEPBOUND) 5 mg/0.5 mL PnIj INJECT 5 MG SUBCUTANEOUSLY EVERY 7 DAYS 4 Pen 0    traMADoL (ULTRAM) 50 mg tablet Take 1 tablet (50 mg total) by mouth every 6 (six) hours as needed for Pain. 30 tablet 0    triamcinolone acetonide 0.1% (KENALOG) 0.1 % cream APPLY TOPICALLY TWICE A DAY 30 g 0    nystatin (NYSTOP) powder Apply topically 3 (three) times  "daily. Apply to affected area (Patient not taking: Reported on 10/23/2024) 1 Bottle 3     No current facility-administered medications on file prior to visit.     Review of Systems   Constitutional:  Negative for appetite change, chills, fatigue and fever.   HENT:  Positive for congestion, postnasal drip, rhinorrhea and sore throat. Negative for drooling, ear discharge, ear pain and trouble swallowing.    Eyes:  Negative for visual disturbance.   Respiratory:  Positive for cough. Negative for shortness of breath and stridor.    Cardiovascular:  Negative for chest pain, palpitations and leg swelling.   Gastrointestinal:  Negative for abdominal pain, diarrhea and vomiting.   Genitourinary:  Negative for difficulty urinating, dysuria and hematuria.   Musculoskeletal:  Negative for arthralgias, myalgias and neck pain.   Skin:  Negative for rash and wound.   Neurological:  Positive for headaches. Negative for dizziness.   Psychiatric/Behavioral:  Negative for behavioral problems. The patient is not nervous/anxious.      Vitals:    11/18/24 1141   BP: 122/76   Pulse: 75   Resp: 17   Temp: 98.4 °F (36.9 °C)   TempSrc: Oral   SpO2: 98%   Weight: 118.2 kg (260 lb 8 oz)   Height: 5' 2" (1.575 m)     Wt Readings from Last 3 Encounters:   11/18/24 118.2 kg (260 lb 8 oz)   10/23/24 115.1 kg (253 lb 11.2 oz)   10/07/24 118.7 kg (261 lb 9.6 oz)     Physical Exam  Vitals reviewed.   Constitutional:       General: She is not in acute distress.     Appearance: Normal appearance. She is not ill-appearing.   HENT:      Head: Normocephalic and atraumatic.      Right Ear: Tympanic membrane, ear canal and external ear normal.      Left Ear: Tympanic membrane, ear canal and external ear normal.      Nose: Congestion present.      Mouth/Throat:      Mouth: Mucous membranes are moist.      Pharynx: Posterior oropharyngeal erythema present.   Eyes:      Extraocular Movements: Extraocular movements intact.      Conjunctiva/sclera: Conjunctivae " normal.   Cardiovascular:      Rate and Rhythm: Normal rate.      Heart sounds: Normal heart sounds.   Pulmonary:      Effort: Pulmonary effort is normal. No respiratory distress.      Breath sounds: Normal breath sounds. No wheezing.   Abdominal:      General: Abdomen is flat. There is no distension.   Musculoskeletal:         General: Normal range of motion.      Cervical back: Normal range of motion and neck supple.   Lymphadenopathy:      Cervical: Cervical adenopathy present.   Skin:     General: Skin is warm and dry.      Capillary Refill: Capillary refill takes less than 2 seconds.      Coloration: Skin is not pale.      Findings: No rash.   Neurological:      General: No focal deficit present.      Mental Status: She is alert and oriented to person, place, and time. Mental status is at baseline.      Motor: No weakness.      Gait: Gait normal.   Psychiatric:         Mood and Affect: Mood normal.         Speech: Speech normal. Speech is not rapid and pressured, delayed or slurred.         Behavior: Behavior normal. Behavior is not agitated, slowed, aggressive, withdrawn, hyperactive or combative. Behavior is cooperative.         Thought Content: Thought content normal.         Judgment: Judgment normal.       Health Maintenance   Topic Date Due    Shingles Vaccine (2 of 2) 08/13/2019    Mammogram  11/27/2024    TETANUS VACCINE  01/03/2029    Lipid Panel  04/04/2029    Colorectal Cancer Screening  04/11/2029    Hepatitis C Screening  Completed     DISCLAIMER: This note was compiled by using a speech recognition dictation system and therefore please be aware that typographical / speech recognition errors can and do occur.  Please contact me if you see any errors specifically.  Consent was obtained for [employer] recording system prior to the visit.

## 2024-11-19 RX ORDER — SEMAGLUTIDE 1.7 MG/.75ML
1.7 INJECTION, SOLUTION SUBCUTANEOUS
Qty: 3 ML | Refills: 2 | Status: SHIPPED | OUTPATIENT
Start: 2024-12-17

## 2024-11-19 RX ORDER — SEMAGLUTIDE 1 MG/.5ML
1 INJECTION, SOLUTION SUBCUTANEOUS
Qty: 2 ML | Refills: 0 | Status: SHIPPED | OUTPATIENT
Start: 2024-11-19 | End: 2024-12-11

## 2024-11-27 ENCOUNTER — HOSPITAL ENCOUNTER (OUTPATIENT)
Dept: RADIOLOGY | Facility: HOSPITAL | Age: 54
Discharge: HOME | End: 2024-11-27
Attending: INTERNAL MEDICINE
Payer: COMMERCIAL

## 2024-11-27 DIAGNOSIS — Z82.49 FAMILY HISTORY OF ABDOMINAL AORTIC ANEURYSM (AAA): ICD-10-CM

## 2024-11-27 DIAGNOSIS — Z12.31 ENCOUNTER FOR SCREENING MAMMOGRAM FOR BREAST CANCER: ICD-10-CM

## 2024-11-27 PROCEDURE — 76706 US ABDL AORTA SCREEN AAA: CPT | Mod: TC,PO

## 2024-11-27 PROCEDURE — 77067 SCR MAMMO BI INCL CAD: CPT | Mod: 26,,, | Performed by: RADIOLOGY

## 2024-11-27 PROCEDURE — 77063 BREAST TOMOSYNTHESIS BI: CPT | Mod: TC,PO

## 2024-11-27 PROCEDURE — 77063 BREAST TOMOSYNTHESIS BI: CPT | Mod: 26,,, | Performed by: RADIOLOGY

## 2024-11-27 PROCEDURE — 76706 US ABDL AORTA SCREEN AAA: CPT | Mod: 26,,, | Performed by: RADIOLOGY

## 2024-11-27 NOTE — PROGRESS NOTES
Results have been released via [employer]. Please verify that these have been viewed by patient. If not, please call patient with results.    I have sent a message to them with the following interpretation (see below).    I have reviewed your recent results.    Negative aneurysm screen.    Please do not hesitate to call or message with any additional questions or concerns.    Terrie Hartmann MD

## 2024-12-02 ENCOUNTER — TELEPHONE (OUTPATIENT)
Dept: BARIATRICS | Facility: CLINIC | Age: 54
End: 2024-12-02
Payer: COMMERCIAL

## 2024-12-02 NOTE — TELEPHONE ENCOUNTER
----- Message from Thad sent at 12/2/2024 11:21 AM CST -----  Regarding: Prior Authorization  Contact: [place] [phone]  [place] calling from [facility] to request a prior authorization to be completed on semaglutide, weight loss, (WEGOVY) 1 mg/0.5 mL PnIj. Please send authorization to the patients pharmacy...    Pharmacy Help Desk phone number: [phone]

## 2024-12-02 NOTE — TELEPHONE ENCOUNTER
PA for medication Wegovy has been completed and submitted to [facility] for review. Waiting for a determination of coverage.

## 2024-12-09 ENCOUNTER — TELEPHONE (OUTPATIENT)
Dept: HEMATOLOGY/ONCOLOGY | Facility: CLINIC | Age: 54
End: 2024-12-09
Payer: COMMERCIAL

## 2024-12-09 NOTE — TELEPHONE ENCOUNTER
LVM to see if patient wanted to schedule her genetic testing that Laurie had placed in the system for her to do.

## 2024-12-18 ENCOUNTER — OFFICE VISIT (OUTPATIENT)
Dept: FAMILY MEDICINE | Facility: CLINIC | Age: 54
End: 2024-12-18
Payer: COMMERCIAL

## 2024-12-18 VITALS
TEMPERATURE: 98 F | OXYGEN SATURATION: 99 % | SYSTOLIC BLOOD PRESSURE: 132 MMHG | BODY MASS INDEX: 48.27 KG/M2 | HEIGHT: 62 IN | HEART RATE: 69 BPM | WEIGHT: 262.31 LBS | DIASTOLIC BLOOD PRESSURE: 89 MMHG | RESPIRATION RATE: 16 BRPM

## 2024-12-18 DIAGNOSIS — J02.8 ACUTE BACTERIAL PHARYNGITIS: Primary | ICD-10-CM

## 2024-12-18 DIAGNOSIS — R05.9 COUGH, UNSPECIFIED TYPE: ICD-10-CM

## 2024-12-18 DIAGNOSIS — B96.89 ACUTE BACTERIAL PHARYNGITIS: Primary | ICD-10-CM

## 2024-12-18 PROCEDURE — 99999 PR PBB SHADOW E&M-EST. PATIENT-LVL V: CPT | Mod: PBBFAC,,, | Performed by: PHYSICIAN ASSISTANT

## 2024-12-18 PROCEDURE — 99213 OFFICE O/P EST LOW 20 MIN: CPT | Mod: S$GLB,,, | Performed by: PHYSICIAN ASSISTANT

## 2024-12-18 RX ORDER — HYDROCODONE POLISTIREX AND CHLORPHENIRAMINE POLISTIREX 10; 8 MG/5ML; MG/5ML
5 SUSPENSION, EXTENDED RELEASE ORAL EVERY 12 HOURS PRN
Qty: 115 ML | Refills: 0 | Status: SHIPPED | OUTPATIENT
Start: 2024-12-18

## 2024-12-18 RX ORDER — AMOXICILLIN AND CLAVULANATE POTASSIUM 875; 125 MG/1; MG/1
1 TABLET, FILM COATED ORAL EVERY 12 HOURS
Qty: 20 TABLET | Refills: 0 | Status: SHIPPED | OUTPATIENT
Start: 2024-12-18 | End: 2024-12-28

## 2024-12-18 NOTE — PROGRESS NOTES
Assessment/Plan:    Problem List Items Addressed This Visit    None  Visit Diagnoses       Acute bacterial pharyngitis    -  Primary    Relevant Medications    amoxicillin-clavulanate 875-125mg (AUGMENTIN) 875-125 mg per tablet    Cough, unspecified type        Relevant Medications    hydrocodone-chlorpheniramine (TUSSIONEX) 10-8 mg/5 mL suspension        -start antibiotics as prescribed  -supportive care- rest, increase hydration with water, OTC Tylenol/Ibuprofen for pain/fever  -follow up if no improvement in symptoms   -ER precautions for severe or worsening of symptoms     Follow up if symptoms worsen or fail to improve.    Laurie Wheeler PA-C  _____________________________________________________________________________________________________________________________________________________    CC: sore throat    HPI: Patient is in clinic today as an established patient here for sore throat.     HISTORY OF PRESENT ILLNESS:  She was diagnosed with COVID-19 three weeks ago, initially presenting with loss of taste, cough, congestion, runny nose, postnasal drip, and sore throat. Most symptoms have resolved, however, she continues to have a sore throat and cough. Currently, she reports a worsening sore throat with stabbing pain, along with increased swelling on the right side of her neck over the past few days. She is producing clear to slightly yellow mucus. She denies fever or breathing difficulties. She has discontinued cough drops. She was previously treated with a steroid pack and cough syrup. She is not currently taking any medication for relief of symptoms.    No other complaints today.     Past Medical History:   Diagnosis Date    H/O hysterectomy for benign disease with BSO 2017    Morbid obesity     Varicose veins     Vitamin D deficiency      Past Surgical History:   Procedure Laterality Date     SECTION      CHOLECYSTECTOMY      COLONOSCOPY N/A 2020    Procedure: COLONOSCOPY;  Surgeon:  Sean Ward MD;  Location: [facility] (2ND FLR);  Service: Endoscopy;  Laterality: N/A;  screening colonooscpy, 49 years old.  siblings with colon polyps   - pt updated on visitor policy and drop off location.  COVID screening ordered for  at [facility] [place] - rb    COLONOSCOPY N/A 2024    Procedure: COLONOSCOPY;  Surgeon: Jhoan Mcdonald MD;  Location: [facility];  Service: Gastroenterology;  Laterality: N/A;    ESOPHAGOGASTRODUODENOSCOPY N/A 2020    Procedure: ESOPHAGOGASTRODUODENOSCOPY (EGD);  Surgeon: Sean Ward MD;  Location: [facility] (2ND FLR);  Service: Endoscopy;  Laterality: N/A;  BMI 51.77  Re-scheduled per md recommendations-new instructions mailed-tb    GASTRIC BYPASS      ZACK FILTER PLACEMENT      zack filter removal      HYSTERECTOMY      DANNA with BSO;    lap band removal       LAPAROSCOPIC GASTRIC BANDING      OOPHORECTOMY      w/hysteretomy @ age 41    TONSILLECTOMY      VASCULAR SURGERY       Review of patient's allergies indicates:   Allergen Reactions    Brompheniramin-phenylephrin-dm      Other reaction(s): Other (See Comments)  Per patient, caused dizziness + blurry vision     Erythromycin Rash    Erythromycin (bulk) Rash     Social History     Tobacco Use    Smoking status: Former     Current packs/day: 0.00     Types: Cigarettes     Start date: 1995     Quit date:      Years since quittin.4    Smokeless tobacco: Never   Substance Use Topics    Alcohol use: Yes     Alcohol/week: 1.0 standard drink of alcohol     Types: 1 Glasses of wine per week     Comment: socially    Drug use: No     Family History   Problem Relation Name Age of Onset    Aortic aneurysm Mother Pat     Diabetes Mother Pat     Lung cancer Father Srinivas 70 - 79        quit tobacco earlier    Hyperlipidemia Sister Anuja     Hypertension Sister Anuja     BAYLEE disease Sister Anuja     Rheum arthritis Sister Anuja     Aortic aneurysm Sister Anuja     Melanoma Brother  Juarez     Stomach cancer Brother Juarez 69        stomach, liver, esophageal    COPD Brother Juarez         emphysema; quit tobacco;    Drug abuse Brother Juarez     Aortic aneurysm Brother Juarez     Dementia Brother Juarez         hx of hypoxia s/p aspiration PNA    Aortic aneurysm Brother Harshad         had repaired    Drug abuse Brother Harshad     Pancreatic cancer Maternal Grandfather  60 - 69    Stomach cancer Paternal Uncle  30 - 39    Stomach cancer Maternal Aunt  69     Current Outpatient Medications on File Prior to Visit   Medication Sig Dispense Refill    acetaminophen (TYLENOL) 500 MG tablet Take 1 tablet (500 mg total) by mouth every 6 (six) hours as needed for Pain.  0    cholecalciferol, vitamin D3, 1,250 mcg (50,000 unit) capsule Take 1 capsule (50,000 Units total) by mouth every 7 days. 12 capsule 3    cyanocobalamin, vitamin B-12, (VITAMIN B-12) 2,500 mcg Subl Place 1 tablet under the tongue once a week. 90 each 3    ferrous sulfate 325 (65 FE) MG EC tablet Take 1 tablet (325 mg total) by mouth once daily. 90 tablet 3    methylPREDNISolone (MEDROL DOSEPACK) 4 mg tablet follow package directions 21 tablet 0    multivitamin capsule Take 3 capsules by mouth 3 (three) times daily.      omeprazole (PRILOSEC) 40 MG capsule Take 1 capsule (40 mg total) by mouth every morning. 180 capsule 0    ondansetron (ZOFRAN-ODT) 4 MG TbDL Take 2 tablets (8 mg total) by mouth every 8 (eight) hours as needed (nausea). 30 tablet 0    semaglutide, weight loss, (WEGOVY) 1.7 mg/0.75 mL PnIj Inject 1.7 mg into the skin every 7 days. 3 mL 2    traMADoL (ULTRAM) 50 mg tablet Take 1 tablet (50 mg total) by mouth every 6 (six) hours as needed for Pain. 30 tablet 0    triamcinolone acetonide 0.1% (KENALOG) 0.1 % cream APPLY TOPICALLY TWICE A DAY 30 g 0    [DISCONTINUED] hydrocodone-chlorpheniramine (TUSSIONEX) 10-8 mg/5 mL suspension Take 5 mLs by mouth every 12 (twelve) hours as needed for Cough. 70 mL 0    nystatin  "(NYSTOP) powder Apply topically 3 (three) times daily. Apply to affected area (Patient not taking: Reported on 10/23/2024) 1 Bottle 3     No current facility-administered medications on file prior to visit.       Review of Systems   Constitutional:  Negative for chills, diaphoresis, fatigue and fever.   HENT:  Positive for sore throat. Negative for congestion, ear pain, postnasal drip, rhinorrhea and sinus pain.    Eyes:  Negative for pain and redness.   Respiratory:  Positive for cough. Negative for chest tightness, shortness of breath and wheezing.    Cardiovascular:  Negative for chest pain and leg swelling.   Gastrointestinal:  Negative for abdominal pain, constipation, diarrhea, nausea and vomiting.   Genitourinary:  Negative for dysuria and hematuria.   Musculoskeletal:  Negative for arthralgias, joint swelling and myalgias.   Skin:  Negative for rash.   Allergic/Immunologic: Negative for environmental allergies.   Neurological:  Negative for dizziness, syncope and headaches.   Psychiatric/Behavioral:  Negative for dysphoric mood. The patient is not nervous/anxious.        Vitals:    12/18/24 0921   BP: 132/89   Pulse: 69   Resp: 16   Temp: 98 °F (36.7 °C)   TempSrc: Oral   SpO2: 99%   Weight: 119 kg (262 lb 4.8 oz)   Height: 5' 2" (1.575 m)       Wt Readings from Last 3 Encounters:   12/18/24 119 kg (262 lb 4.8 oz)   11/18/24 118.2 kg (260 lb 8 oz)   10/23/24 115.1 kg (253 lb 11.2 oz)       Physical Exam  Constitutional:       General: She is not in acute distress.     Appearance: Normal appearance. She is well-developed.   HENT:      Head: Normocephalic and atraumatic.      Right Ear: Tympanic membrane normal.      Left Ear: Tympanic membrane normal.      Nose: Nose normal.      Mouth/Throat:      Mouth: Mucous membranes are moist.      Pharynx: Posterior oropharyngeal erythema present.   Eyes:      Conjunctiva/sclera: Conjunctivae normal.   Cardiovascular:      Rate and Rhythm: Normal rate and regular " rhythm.      Pulses: Normal pulses.      Heart sounds: Normal heart sounds. No murmur heard.  Pulmonary:      Effort: Pulmonary effort is normal. No respiratory distress.      Breath sounds: Normal breath sounds. No wheezing.   Abdominal:      General: Bowel sounds are normal. There is no distension.      Palpations: Abdomen is soft.      Tenderness: There is no abdominal tenderness.   Musculoskeletal:         General: Normal range of motion.      Cervical back: Normal range of motion and neck supple.   Lymphadenopathy:      Cervical: No cervical adenopathy.   Skin:     General: Skin is warm and dry.      Findings: No rash.   Neurological:      General: No focal deficit present.      Mental Status: She is alert and oriented to person, place, and time.   Psychiatric:         Mood and Affect: Mood normal.         Behavior: Behavior normal.         Health Maintenance   Topic Date Due    Shingles Vaccine (2 of 2) 08/13/2019    Influenza Vaccine (1) 09/01/2024    COVID-19 Vaccine (3 - 2024-25 season) 09/01/2024    Hemoglobin A1c (Diabetic Prevention Screening)  02/18/2025    Mammogram  11/27/2025    TETANUS VACCINE  01/03/2029    Lipid Panel  04/04/2029    Colorectal Cancer Screening  04/11/2029    RSV Vaccine (Age 60+ and Pregnant patients) (1 - 1-dose 75+ series) 05/24/2044    Hepatitis C Screening  Completed    HIV Screening  Completed    Pneumococcal Vaccines (Age 0-64)  Aged Out     DISCLAIMER: This note was compiled by using a speech recognition dictation system and therefore please be aware that typographical / speech recognition errors can and do occur.  Please contact me if you see any errors specifically.  Consent was obtained for [employer] recording system prior to the visit.

## 2025-01-27 ENCOUNTER — OFFICE VISIT (OUTPATIENT)
Dept: FAMILY MEDICINE | Facility: CLINIC | Age: 55
End: 2025-01-27
Payer: COMMERCIAL

## 2025-01-27 VITALS
DIASTOLIC BLOOD PRESSURE: 83 MMHG | HEIGHT: 62 IN | HEART RATE: 69 BPM | SYSTOLIC BLOOD PRESSURE: 129 MMHG | TEMPERATURE: 98 F | WEIGHT: 267 LBS | BODY MASS INDEX: 49.13 KG/M2

## 2025-01-27 DIAGNOSIS — M54.9 DORSALGIA, UNSPECIFIED: Primary | ICD-10-CM

## 2025-01-27 DIAGNOSIS — M54.42 CHRONIC LEFT-SIDED LOW BACK PAIN WITH LEFT-SIDED SCIATICA: ICD-10-CM

## 2025-01-27 DIAGNOSIS — R21 RASH: ICD-10-CM

## 2025-01-27 DIAGNOSIS — G89.29 CHRONIC LEFT-SIDED LOW BACK PAIN WITH LEFT-SIDED SCIATICA: ICD-10-CM

## 2025-01-27 PROCEDURE — 99214 OFFICE O/P EST MOD 30 MIN: CPT | Mod: S$GLB,,, | Performed by: INTERNAL MEDICINE

## 2025-01-27 PROCEDURE — 99999 PR PBB SHADOW E&M-EST. PATIENT-LVL V: CPT | Mod: PBBFAC,,, | Performed by: INTERNAL MEDICINE

## 2025-01-27 RX ORDER — TRAMADOL HYDROCHLORIDE 50 MG/1
50 TABLET ORAL EVERY 6 HOURS PRN
Qty: 30 TABLET | Refills: 0 | Status: SHIPPED | OUTPATIENT
Start: 2025-01-27

## 2025-01-27 RX ORDER — PREGABALIN 25 MG/1
25 CAPSULE ORAL 2 TIMES DAILY
Qty: 60 CAPSULE | Refills: 0 | Status: SHIPPED | OUTPATIENT
Start: 2025-01-27 | End: 2025-07-28

## 2025-01-27 NOTE — PROGRESS NOTES
Assessment/Plan:    1. Dorsalgia, unspecified  -     MRI Lumbar Spine Without Contrast; Future; Expected date: 01/27/2025    2. Chronic left-sided low back pain with sciatica   -continued back pain despite conservative management   -completed outpatient PT and continues to do exercises at home but without improvement of symptoms   -will proceed with MRI of lumbar spine and refer to pain specialist   -start trial of lyrica    -also refilling tramadol for severe pain prn  -     Ambulatory referral/consult to Pain Clinic; Future; Expected date: 02/03/2025  -     MRI Lumbar Spine Without Contrast; Future; Expected date: 01/27/2025  -     traMADoL (ULTRAM) 50 mg tablet; Take 1 tablet (50 mg total) by mouth every 6 (six) hours as needed for Pain.  Dispense: 30 tablet; Refill: 0  -     pregabalin (LYRICA) 25 MG capsule; Take 1 capsule (25 mg total) by mouth 2 (two) times daily.  Dispense: 60 capsule; Refill: 0    3. Rash  -     Ambulatory referral/consult to Dermatology; Future; Expected date: 02/03/2025        Visit today included increased complexity associated with the care of the episodic problem(s) addressed and managing the longitudinal care of the patient due to the serious and/or complex managed problem(s). See above assessment/plan.    Follow up for MRI, followed by pain managment appt; derm appt.    Terrie Hartmann MD  _____________________________________________________________________________________________________________________________________________________    CC: back pain    Patient is in clinic today as an established patient.    History of Present Illness  The patient is a 54-year-old female who presents for evaluation of continued back pain.    She reports persistent back pain, which has not improved despite undergoing physical therapy and dry needling. The latter provided only temporary relief. She continues to experience constant pain, even while seated on her couch.  Pain continues to be  primarily localized to the left side, with radiation down the L leg. She denies any symptoms on the R side. She reports no new weakness, numbness, or tingling. She has been managing her pain with tramadol, prescribed approximately a year ago, which she has been using sparingly, taking half doses at night to aid sleep. She has exhausted her supply of tramadol and has been supplementing with ibuprofen and Tylenol. She has invested in a new mattress but it has not alleviated her symptoms. She anticipates an increase in her activity level due to upcoming work commitments and a visit from her sister.      She has a persistent rash that has not resolved with topical treatment. She is considering a consultation with a dermatologist.     No other new complaints today.      Current Outpatient Medications on File Prior to Visit   Medication Sig Dispense Refill    acetaminophen (TYLENOL) 500 MG tablet Take 1 tablet (500 mg total) by mouth every 6 (six) hours as needed for Pain.  0    cholecalciferol, vitamin D3, 1,250 mcg (50,000 unit) capsule Take 1 capsule (50,000 Units total) by mouth every 7 days. 12 capsule 3    cyanocobalamin, vitamin B-12, (VITAMIN B-12) 2,500 mcg Subl Place 1 tablet under the tongue once a week. 90 each 3    ferrous sulfate 325 (65 FE) MG EC tablet Take 1 tablet (325 mg total) by mouth once daily. 90 tablet 3    multivitamin capsule Take 3 capsules by mouth 3 (three) times daily.      nystatin (NYSTOP) powder Apply topically 3 (three) times daily. Apply to affected area 1 Bottle 3    omeprazole (PRILOSEC) 40 MG capsule Take 1 capsule (40 mg total) by mouth every morning. 180 capsule 0    ondansetron (ZOFRAN-ODT) 4 MG TbDL Take 2 tablets (8 mg total) by mouth every 8 (eight) hours as needed (nausea). 30 tablet 0    semaglutide, weight loss, (WEGOVY) 1.7 mg/0.75 mL PnIj Inject 1.7 mg into the skin every 7 days. 3 mL 2    triamcinolone acetonide 0.1% (KENALOG) 0.1 % cream APPLY TOPICALLY TWICE A DAY 30 g  "0    [DISCONTINUED] hydrocodone-chlorpheniramine (TUSSIONEX) 10-8 mg/5 mL suspension Take 5 mLs by mouth every 12 (twelve) hours as needed for Cough. 115 mL 0    [DISCONTINUED] traMADoL (ULTRAM) 50 mg tablet Take 1 tablet (50 mg total) by mouth every 6 (six) hours as needed for Pain. 30 tablet 0    [DISCONTINUED] methylPREDNISolone (MEDROL DOSEPACK) 4 mg tablet follow package directions 21 tablet 0     No current facility-administered medications on file prior to visit.       Review of Systems   Constitutional:  Negative for chills, diaphoresis, fatigue and fever.   HENT:  Negative for congestion, ear pain, postnasal drip, sinus pain and sore throat.    Eyes:  Negative for pain and redness.   Respiratory:  Negative for cough, chest tightness and shortness of breath.    Cardiovascular:  Negative for chest pain and leg swelling.   Gastrointestinal:  Negative for abdominal pain, constipation, diarrhea, nausea and vomiting.   Genitourinary:  Negative for dysuria, hematuria and pelvic pain.   Musculoskeletal:  Positive for back pain. Negative for arthralgias and joint swelling.   Skin:  Negative for rash.   Neurological:  Negative for dizziness, syncope, weakness, numbness and headaches.   Psychiatric/Behavioral:  Negative for dysphoric mood. The patient is not nervous/anxious.        Vitals:    01/27/25 0740   BP: 129/83   Pulse: 69   Temp: 97.5 °F (36.4 °C)   TempSrc: Temporal   Weight: 121.1 kg (267 lb)   Height: 5' 2" (1.575 m)       Wt Readings from Last 3 Encounters:   01/27/25 121.1 kg (267 lb)   12/18/24 119 kg (262 lb 4.8 oz)   11/18/24 118.2 kg (260 lb 8 oz)       Physical Exam  Constitutional:       General: She is not in acute distress.     Appearance: Normal appearance. She is well-developed. She is obese.   HENT:      Head: Normocephalic and atraumatic.   Eyes:      Conjunctiva/sclera: Conjunctivae normal.   Cardiovascular:      Rate and Rhythm: Normal rate and regular rhythm.      Pulses: Normal pulses.    "   Heart sounds: Normal heart sounds. No murmur heard.  Pulmonary:      Effort: Pulmonary effort is normal. No respiratory distress.      Breath sounds: Normal breath sounds.   Abdominal:      General: Bowel sounds are normal. There is no distension.      Palpations: Abdomen is soft.      Tenderness: There is no abdominal tenderness.   Musculoskeletal:         General: Normal range of motion.      Cervical back: Normal range of motion and neck supple.   Skin:     General: Skin is warm and dry.      Findings: Rash present.      Comments: Rash at base of neck/upper back; see picture below   Neurological:      General: No focal deficit present.      Mental Status: She is alert and oriented to person, place, and time.      Sensory: No sensory deficit.      Motor: No weakness.      Gait: Gait normal.   Psychiatric:         Mood and Affect: Mood normal.         Behavior: Behavior normal.         DISCLAIMER: This note was compiled by using a speech recognition dictation system and therefore please be aware that typographical / speech recognition errors can and do occur.  Please contact me if you see any errors specifically.  Consent was obtained for [employer] recording system prior to the visit.  Answers submitted by the patient for this visit:  Back Pain Questionnaire (Submitted on 1/27/2025)  Chief Complaint: Back pain  Chronicity: chronic  Onset: more than 1 year ago  Frequency: constantly  Progression since onset: gradually worsening  Pain location: sacro-iliac  Pain quality: aching, burning, shooting  Radiates to: left foot, left knee, left thigh  Pain - numeric: 9/10  Pain is: worse during the night  Aggravated by: sitting, standing  Stiffness is present: all day  bladder incontinence: No  leg pain: Yes  paresis: No  paresthesias: No  perianal numbness: No  tingling: No  weight loss: No  genital pain: No  Pain severity: moderate  Improvement on treatment: no relief

## 2025-02-03 ENCOUNTER — HOSPITAL ENCOUNTER (OUTPATIENT)
Dept: RADIOLOGY | Facility: HOSPITAL | Age: 55
Discharge: HOME | End: 2025-02-03
Attending: INTERNAL MEDICINE
Payer: COMMERCIAL

## 2025-02-03 DIAGNOSIS — G89.29 CHRONIC LEFT-SIDED LOW BACK PAIN WITH LEFT-SIDED SCIATICA: ICD-10-CM

## 2025-02-03 DIAGNOSIS — M54.42 CHRONIC LEFT-SIDED LOW BACK PAIN WITH LEFT-SIDED SCIATICA: ICD-10-CM

## 2025-02-03 DIAGNOSIS — M54.9 DORSALGIA, UNSPECIFIED: ICD-10-CM

## 2025-02-03 PROCEDURE — 72148 MRI LUMBAR SPINE W/O DYE: CPT | Mod: TC,PO

## 2025-02-03 PROCEDURE — 72148 MRI LUMBAR SPINE W/O DYE: CPT | Mod: 26,,, | Performed by: RADIOLOGY

## 2025-02-12 ENCOUNTER — OFFICE VISIT (OUTPATIENT)
Dept: PAIN MEDICINE | Facility: CLINIC | Age: 55
End: 2025-02-12
Payer: COMMERCIAL

## 2025-02-12 ENCOUNTER — TELEPHONE (OUTPATIENT)
Dept: PAIN MEDICINE | Facility: CLINIC | Age: 55
End: 2025-02-12
Payer: COMMERCIAL

## 2025-02-12 VITALS — WEIGHT: 267 LBS | BODY MASS INDEX: 49.13 KG/M2 | HEIGHT: 62 IN

## 2025-02-12 DIAGNOSIS — M54.42 CHRONIC LEFT-SIDED LOW BACK PAIN WITH LEFT-SIDED SCIATICA: ICD-10-CM

## 2025-02-12 DIAGNOSIS — G89.29 CHRONIC LEFT-SIDED LOW BACK PAIN WITH LEFT-SIDED SCIATICA: ICD-10-CM

## 2025-02-12 DIAGNOSIS — M46.1 SACROILIITIS: Primary | ICD-10-CM

## 2025-02-12 PROCEDURE — 99999 PR PBB SHADOW E&M-EST. PATIENT-LVL III: CPT | Mod: PBBFAC,,, | Performed by: STUDENT IN AN ORGANIZED HEALTH CARE EDUCATION/TRAINING PROGRAM

## 2025-02-12 PROCEDURE — 99204 OFFICE O/P NEW MOD 45 MIN: CPT | Mod: S$GLB,,, | Performed by: STUDENT IN AN ORGANIZED HEALTH CARE EDUCATION/TRAINING PROGRAM

## 2025-02-12 RX ORDER — ALPRAZOLAM 1 MG/1
1 TABLET, ORALLY DISINTEGRATING ORAL ONCE AS NEEDED
OUTPATIENT
Start: 2025-02-12 | End: 2036-07-11

## 2025-02-12 NOTE — TELEPHONE ENCOUNTER
Types of orders made on 02/12/2025: Outpatient Referral, Procedure Request      Order Date:2/12/2025   Ordering User:SHARYN VICTOR [[id number]]   Encounter Provider:Sharyn Victor MD [[id number]]   Authorizing Provider: Sharyn Victor MD [[id number]]   Department:[facility] PAIN MANAGEMENT[[id number]]      Common Order Information   Procedure -> Sacroiliac Injection (Specify laterality) Cmt: L SIJ (Cov)      Order Specific Information   Order: Procedure Order to Pain Management [Custom: [id number]]  Order #:          [id number]: 1 FUTURE     Priority: Routine  Class: Clinic Performed     Future Order Information       Expires on:02/12/2026            Expected by:02/12/2025                   Associated Diagnoses       M46.1 Sacroiliitis       Facility Name: -> [facility]          Follow-up: -> 2 weeks              Priority: Routine  Class: Clinic Performed     Future Order Information       Expires on:02/12/2026            Expected by:02/12/2025                   Associated Diagnoses       M46.1 Sacroiliitis       Procedure -> Sacroiliac Injection (Specify laterality) Cmt: L SIJ (Cov)

## 2025-02-13 ENCOUNTER — OFFICE VISIT (OUTPATIENT)
Dept: DERMATOLOGY | Facility: CLINIC | Age: 55
End: 2025-02-13
Payer: COMMERCIAL

## 2025-02-13 DIAGNOSIS — L30.9 DERMATITIS: Primary | ICD-10-CM

## 2025-02-13 DIAGNOSIS — R21 RASH: ICD-10-CM

## 2025-02-13 PROCEDURE — 99204 OFFICE O/P NEW MOD 45 MIN: CPT | Mod: S$GLB,,, | Performed by: STUDENT IN AN ORGANIZED HEALTH CARE EDUCATION/TRAINING PROGRAM

## 2025-02-13 PROCEDURE — 99999 PR PBB SHADOW E&M-EST. PATIENT-LVL III: CPT | Mod: PBBFAC,,, | Performed by: STUDENT IN AN ORGANIZED HEALTH CARE EDUCATION/TRAINING PROGRAM

## 2025-02-13 PROCEDURE — G2211 COMPLEX E/M VISIT ADD ON: HCPCS | Mod: S$GLB,,, | Performed by: STUDENT IN AN ORGANIZED HEALTH CARE EDUCATION/TRAINING PROGRAM

## 2025-02-13 RX ORDER — TRETINOIN 0.25 MG/G
CREAM TOPICAL NIGHTLY
Qty: 45 G | Refills: 3 | Status: SHIPPED | OUTPATIENT
Start: 2025-02-13

## 2025-02-13 RX ORDER — TACROLIMUS 1 MG/G
OINTMENT TOPICAL 2 TIMES DAILY
Qty: 60 G | Refills: 1 | Status: SHIPPED | OUTPATIENT
Start: 2025-02-13

## 2025-02-13 RX ORDER — MOMETASONE FUROATE 1 MG/G
OINTMENT TOPICAL DAILY
Qty: 45 G | Refills: 2 | Status: SHIPPED | OUTPATIENT
Start: 2025-02-13

## 2025-02-13 NOTE — PROGRESS NOTES
[place] - Department    Terrie Hartmann MD      First Office Visit: 2/12/25  Today' Date: 2/12/2025  Last Office Visit: None    Chief complaint: back pain (L side)    HPI: Pt is a pleasant 54 y.o., who presents for evaluation. Referred by Dr. Hartmann. Pt seen for L sided back/buttock pain for mos. States she has been doing dry needling which helps. Pain stays mostly in the L and is tender to touch. Occasionally has pain that goes down the front and at time the back of the L leg. States back pain stays mostly in the back and is worse with bending. No BB changes. Is doing HEP.         Pain disability index score: 49  Pain score: 7    Relevant Imaging/ Testing:   MR L-spine 1/25    Procedures: None    Date of board of pharmacy review:2/12/2025  Date of opioid risk screening/ pain psych: None  Date of opioid agreement and consent: None  Date of urine drug screen: None  Date of random pill count: None     was reviewed today: reviewed, no concerns     Prescribed medications: None    See EHR for  PMH, PSH, FH, SH, Medications and Allergy    ROS:  Positive for pain  ROS     PE:  There were no vitals filed for this visit.  General: Pleasant, no distress  HEENT: NC/ AT. PERRLA  CV: Radial pulses intact  Pulm: No distress  Ext: No edema    Physical Exam     Neuromusculoskeletal:  Head: NC, AT. PERRLA  Neck: Intact range of motions  Shoulder: Intact range of motion  Lumbar: Intact range of motion. Bilat Facet loading. Min Tenderness. Neg SL. No pain with flexion. Pain with extension.   Hip: Intact range of motion  SI: Level, L SIJ TTP, Pos ANETA's, gaenslens, and thigh thrust   Knee: Intact range of motion. Min Tenderness. No Swelling. Min Crepitis  Reflexes: normal Knee  Strength: 5/5 globally   Sensory: Grossly intact   Skin: No bruising, erythema  Gait: Normal      Impression:  Back pain (L side)  Relevant History  BMI 48.83  Anxiety  H/o DVT   H/o bariatric surgery   Vit D deficiency     Assessment:  L  sacroiliitis  Axial back pain   Discogenic back pain     Plan:  Discussed options  Imaging/ relevant records viewed/ reviewed/ discussed  Imaging results viewed and reviewed (noted above)/ reviewed with patient   reviewed  Cont HEP  L SIJ injection  Re-eval after  Consider B MBB L4-5 and L5-S1 for axial back pain   Consider viadisc L4-5       Prescribed medications:  1. None    The impression and plan were discussed and explained in detail. All the questions were answered. Education was provided accordingly.     The procedure was explained in detail, along with risks and potential side effects.    Follow-up:  For procedure     Aaliyah Rodriguez MD

## 2025-02-13 NOTE — PROGRESS NOTES
Subjective:       Patient ID:  Felicia Drew is a 54 y.o. female who presents for   Chief Complaint   Patient presents with    Rash     back     History of Present Illness: The patient presents with chief complaint of recurring rash/itching.  Location: mostly on the upper back  Duration: ongoing for several months  Signs/Symptoms: reports having a recurring very itchy, red and bumpy rash on the skin. Denies any known triggers for symptoms. No recent contacts or exposures or changes in soaps, etc  Prior treatments: TAC which helped some.       Rash        Review of Systems   Constitutional:  Negative for fever and chills.   Skin:  Positive for itching, rash and dry skin.        Objective:    Physical Exam   Constitutional: She appears well-developed and well-nourished. No distress.   Neurological: She is alert and oriented to person, place, and time. She is not disoriented.   Psychiatric: She has a normal mood and affect.   Skin:   Areas Examined (abnormalities noted in diagram):   Head / Face Inspection Performed  Neck Inspection Performed  Back Inspection Performed  RUE Inspected  LUE Inspection Performed              Diagram Legend     Erythematous scaling macule/papule c/w actinic keratosis       Vascular papule c/w angioma      Pigmented verrucoid papule/plaque c/w seborrheic keratosis      Yellow umbilicated papule c/w sebaceous hyperplasia      Irregularly shaped tan macule c/w lentigo     1-2 mm smooth white papules consistent with Milia      Movable subcutaneous cyst with punctum c/w epidermal inclusion cyst      Subcutaneous movable cyst c/w pilar cyst      Firm pink to brown papule c/w dermatofibroma      Pedunculated fleshy papule(s) c/w skin tag(s)      Evenly pigmented macule c/w junctional nevus     Mildly variegated pigmented, slightly irregular-bordered macule c/w mildly atypical nevus      Flesh colored to evenly pigmented papule c/w intradermal nevus       Pink pearly papule/plaque c/w  basal cell carcinoma      Erythematous hyperkeratotic cursted plaque c/w SCC      Surgical scar with no sign of skin cancer recurrence      Open and closed comedones      Inflammatory papules and pustules      Verrucoid papule consistent consistent with wart     Erythematous eczematous patches and plaques     Dystrophic onycholytic nail with subungual debris c/w onychomycosis     Umbilicated papule    Erythematous-base heme-crusted tan verrucoid plaque consistent with inflamed seborrheic keratosis     Erythematous Silvery Scaling Plaque c/w Psoriasis     See annotation      Assessment / Plan:        Dermatitis  -     mometasone (ELOCON) 0.1 % ointment; Apply topically once daily.  Dispense: 45 g; Refill: 2  -     tacrolimus (PROTOPIC) 0.1 % ointment; Apply topically 2 (two) times daily. Non steroid  Dispense: 60 g; Refill: 1  -     Counseled patient on gentle skin care regimen, including need for sensitive soaps/detergents, as well as need for frequent use of sensitize moisturizers.       Other orders  -     tretinoin (RETIN-A) 0.025 % cream; Apply topically every evening.  Dispense: 45 g; Refill: 3             Follow up in about 3 months (around 5/13/2025).

## 2025-02-13 NOTE — H&P (VIEW-ONLY)
[place] - Department    Terrie Hartmann MD      First Office Visit: 2/12/25  Today' Date: 2/12/2025  Last Office Visit: None    Chief complaint: back pain (L side)    HPI: Pt is a pleasant 54 y.o., who presents for evaluation. Referred by Dr. Hartmann. Pt seen for L sided back/buttock pain for mos. States she has been doing dry needling which helps. Pain stays mostly in the L and is tender to touch. Occasionally has pain that goes down the front and at time the back of the L leg. States back pain stays mostly in the back and is worse with bending. No BB changes. Is doing HEP.         Pain disability index score: 49  Pain score: 7    Relevant Imaging/ Testing:   MR L-spine 1/25    Procedures: None    Date of board of pharmacy review:2/12/2025  Date of opioid risk screening/ pain psych: None  Date of opioid agreement and consent: None  Date of urine drug screen: None  Date of random pill count: None     was reviewed today: reviewed, no concerns     Prescribed medications: None    See EHR for  PMH, PSH, FH, SH, Medications and Allergy    ROS:  Positive for pain  ROS     PE:  There were no vitals filed for this visit.  General: Pleasant, no distress  HEENT: NC/ AT. PERRLA  CV: Radial pulses intact  Pulm: No distress  Ext: No edema    Physical Exam     Neuromusculoskeletal:  Head: NC, AT. PERRLA  Neck: Intact range of motions  Shoulder: Intact range of motion  Lumbar: Intact range of motion. Bilat Facet loading. Min Tenderness. Neg SL. No pain with flexion. Pain with extension.   Hip: Intact range of motion  SI: Level, L SIJ TTP, Pos ANETA's, gaenslens, and thigh thrust   Knee: Intact range of motion. Min Tenderness. No Swelling. Min Crepitis  Reflexes: normal Knee  Strength: 5/5 globally   Sensory: Grossly intact   Skin: No bruising, erythema  Gait: Normal      Impression:  Back pain (L side)  Relevant History  BMI 48.83  Anxiety  H/o DVT   H/o bariatric surgery   Vit D deficiency     Assessment:  L  sacroiliitis  Axial back pain   Discogenic back pain     Plan:  Discussed options  Imaging/ relevant records viewed/ reviewed/ discussed  Imaging results viewed and reviewed (noted above)/ reviewed with patient   reviewed  Cont HEP  L SIJ injection  Re-eval after  Consider B MBB L4-5 and L5-S1 for axial back pain   Consider viadisc L4-5       Prescribed medications:  1. None    The impression and plan were discussed and explained in detail. All the questions were answered. Education was provided accordingly.     The procedure was explained in detail, along with risks and potential side effects.    Follow-up:  For procedure     Aaliyah Rodriguez MD

## 2025-03-13 ENCOUNTER — HOSPITAL ENCOUNTER (OUTPATIENT)
Facility: HOSPITAL | Age: 55
Discharge: HOME | End: 2025-03-13
Attending: STUDENT IN AN ORGANIZED HEALTH CARE EDUCATION/TRAINING PROGRAM | Admitting: STUDENT IN AN ORGANIZED HEALTH CARE EDUCATION/TRAINING PROGRAM
Payer: COMMERCIAL

## 2025-03-13 ENCOUNTER — HOSPITAL ENCOUNTER (OUTPATIENT)
Dept: RADIOLOGY | Facility: HOSPITAL | Age: 55
Discharge: HOME | End: 2025-03-13
Attending: STUDENT IN AN ORGANIZED HEALTH CARE EDUCATION/TRAINING PROGRAM | Admitting: STUDENT IN AN ORGANIZED HEALTH CARE EDUCATION/TRAINING PROGRAM
Payer: COMMERCIAL

## 2025-03-13 DIAGNOSIS — M54.42 CHRONIC LEFT-SIDED LOW BACK PAIN WITH LEFT-SIDED SCIATICA: ICD-10-CM

## 2025-03-13 DIAGNOSIS — G89.29 CHRONIC LEFT-SIDED LOW BACK PAIN WITH LEFT-SIDED SCIATICA: ICD-10-CM

## 2025-03-13 DIAGNOSIS — M46.1 SACROILIITIS: ICD-10-CM

## 2025-03-13 DIAGNOSIS — M54.50 LOWER BACK PAIN: ICD-10-CM

## 2025-03-13 PROCEDURE — 27096 INJECT SACROILIAC JOINT: CPT | Mod: PO,LT | Performed by: STUDENT IN AN ORGANIZED HEALTH CARE EDUCATION/TRAINING PROGRAM

## 2025-03-13 PROCEDURE — 27096 INJECT SACROILIAC JOINT: CPT | Mod: LT,,, | Performed by: STUDENT IN AN ORGANIZED HEALTH CARE EDUCATION/TRAINING PROGRAM

## 2025-03-13 PROCEDURE — 63600175 PHARM REV CODE 636 W HCPCS: Mod: PO | Performed by: STUDENT IN AN ORGANIZED HEALTH CARE EDUCATION/TRAINING PROGRAM

## 2025-03-13 PROCEDURE — 25500020 PHARM REV CODE 255: Mod: PO | Performed by: STUDENT IN AN ORGANIZED HEALTH CARE EDUCATION/TRAINING PROGRAM

## 2025-03-13 PROCEDURE — 25000003 PHARM REV CODE 250: Mod: PO | Performed by: STUDENT IN AN ORGANIZED HEALTH CARE EDUCATION/TRAINING PROGRAM

## 2025-03-13 RX ORDER — LIDOCAINE HYDROCHLORIDE 10 MG/ML
INJECTION, SOLUTION EPIDURAL; INFILTRATION; INTRACAUDAL; PERINEURAL
Status: DISCONTINUED | OUTPATIENT
Start: 2025-03-13 | End: 2025-03-13 | Stop reason: HOSPADM

## 2025-03-13 RX ORDER — DEXAMETHASONE SODIUM PHOSPHATE 10 MG/ML
INJECTION, SOLUTION INTRA-ARTICULAR; INTRALESIONAL; INTRAMUSCULAR; INTRAVENOUS; SOFT TISSUE
Status: DISCONTINUED | OUTPATIENT
Start: 2025-03-13 | End: 2025-03-13 | Stop reason: HOSPADM

## 2025-03-13 RX ORDER — ALPRAZOLAM 0.5 MG/1
1 TABLET, ORALLY DISINTEGRATING ORAL ONCE AS NEEDED
Status: COMPLETED | OUTPATIENT
Start: 2025-03-13 | End: 2025-03-13

## 2025-03-13 RX ORDER — BUPIVACAINE HYDROCHLORIDE 2.5 MG/ML
INJECTION, SOLUTION EPIDURAL; INFILTRATION; INTRACAUDAL; PERINEURAL
Status: DISCONTINUED | OUTPATIENT
Start: 2025-03-13 | End: 2025-03-13 | Stop reason: HOSPADM

## 2025-03-13 RX ADMIN — ALPRAZOLAM 1 MG: 0.5 TABLET, ORALLY DISINTEGRATING ORAL at 07:03

## 2025-03-13 NOTE — DISCHARGE SUMMARY
[facility] - Surgery  Discharge Note  Short Stay    Procedure(s) (LRB):  INJECTION,SACROILIAC JOINT ( oral sedation) (Left)      OUTCOME: Patient tolerated treatment/procedure well without complication and is now ready for discharge.    DISPOSITION: Home or Self Care    FINAL DIAGNOSIS:  <principal problem not specified>    FOLLOWUP: In clinic    DISCHARGE INSTRUCTIONS:    Discharge Procedure Orders   Notify your health care provider if you experience any of the following:  temperature >100.4     Notify your health care provider if you experience any of the following:  severe uncontrolled pain     Notify your health care provider if you experience any of the following:  redness, tenderness, or signs of infection (pain, swelling, redness, odor or green/yellow discharge around incision site)     Activity as tolerated        TIME SPENT ON DISCHARGE: 20 minutes

## 2025-03-13 NOTE — TELEPHONE ENCOUNTER
No care due was identified.  [facility] Embedded Care Due Messages. Reference number: [id number].   3/13/2025 2:07:53 PM CDT

## 2025-03-13 NOTE — TELEPHONE ENCOUNTER
Patient comment: Need refill but can we move up a mg as discussed at appointment if i feel the 25 were not working as well

## 2025-03-13 NOTE — TELEPHONE ENCOUNTER
Care Due:                  Date            Visit Type   Department     Provider  --------------------------------------------------------------------------------                                MYCHART                              FOLLOWUP/OF  [facility]  Last Visit: 01-      FICE VISIT   MEDICINE       Terrie Hartmann  Next Visit: None Scheduled  None         None Found                                                            Last  Test          Frequency    Reason                     Performed    Due Date  --------------------------------------------------------------------------------    Ca..........  12 months..  cholecalciferol,.........  04- 03-    Vitamin D...  12 months..  cholecalciferol,.........  04- 03-    [facility] Embedded Care Due Messages. Reference number: [id number].   3/13/2025 8:56:33 AM CDT

## 2025-03-13 NOTE — OP NOTE
Patient: Felicia Drew                                                    MRN: [id number]  : 1970                                              Date of procedure: 3/13/2025      Pre Procedure Diagnosis: Sacroilitis     Post Procedure Diagnosis: Same     Procedure: Left Sacroiliac Joint Injection under Fluoroscopy      Attending: Aaliyah Rodriguez MD     Local Anesthetic Injected: Lidocaine 1% 3ml    Sedation Medications: None     Estimated Blood Loss: None     Complication: None           Procedure:  After informed consent was obtained, patient was taken to the fluoroscopy suite and placed in a prone position.  The skin was prepped and draped in the usual sterile fashion using chlorhexidine.  Anatomical landmarks were identified with the aid of fluoroscopy, and the skin and subcutaneous tissue overlying the joint was infiltrated with 3mL of 1% Lidocaine via a 25-gauge 1.5inch needle.  A 22-gauge 3.5inch needle was advanced with the aid of fluoroscopy into the joint.  Needle placement was confirmed with fluoroscopy in PA and Lateral views.  After negative aspiration, 0.5 mL of contrast was injected, delineating the joint. Then, after negative aspiration, 2 mL of 0.25% bupivicaine with 0.5 mL of 10mg/ml of dexamethasone was injected into SI joint space.  Patient tolerated the procedure well and all needles were removed intact.  There were no complications.     Patient was observed in recovery and discharged home with written instruction under supervision in stable condition.

## 2025-03-14 ENCOUNTER — RESULTS FOLLOW-UP (OUTPATIENT)
Dept: FAMILY MEDICINE | Facility: CLINIC | Age: 55
End: 2025-03-14

## 2025-03-14 VITALS
RESPIRATION RATE: 16 BRPM | SYSTOLIC BLOOD PRESSURE: 145 MMHG | WEIGHT: 267 LBS | DIASTOLIC BLOOD PRESSURE: 82 MMHG | TEMPERATURE: 98 F | HEART RATE: 68 BPM | OXYGEN SATURATION: 98 % | HEIGHT: 62 IN | BODY MASS INDEX: 49.13 KG/M2

## 2025-03-14 DIAGNOSIS — M54.42 CHRONIC LEFT-SIDED LOW BACK PAIN WITH LEFT-SIDED SCIATICA: ICD-10-CM

## 2025-03-14 DIAGNOSIS — G89.29 CHRONIC LEFT-SIDED LOW BACK PAIN WITH LEFT-SIDED SCIATICA: ICD-10-CM

## 2025-03-14 RX ORDER — PREGABALIN 50 MG/1
50 CAPSULE ORAL 2 TIMES DAILY
Qty: 60 CAPSULE | Refills: 0 | Status: SHIPPED | OUTPATIENT
Start: 2025-03-14 | End: 2025-09-12

## 2025-03-14 RX ORDER — TRAMADOL HYDROCHLORIDE 50 MG/1
50 TABLET ORAL EVERY 6 HOURS PRN
Qty: 30 TABLET | Refills: 0 | OUTPATIENT
Start: 2025-03-14

## 2025-03-14 NOTE — TELEPHONE ENCOUNTER
No care due was identified.  [facility] Embedded Care Due Messages. Reference number: [id number].   3/14/2025 1:51:11 PM CDT

## 2025-03-25 RX ORDER — TRAMADOL HYDROCHLORIDE 50 MG/1
50 TABLET ORAL EVERY 6 HOURS PRN
Qty: 30 TABLET | Refills: 0 | Status: SHIPPED | OUTPATIENT
Start: 2025-03-25

## 2025-04-10 ENCOUNTER — OFFICE VISIT (OUTPATIENT)
Dept: PAIN MEDICINE | Facility: CLINIC | Age: 55
End: 2025-04-10
Payer: COMMERCIAL

## 2025-04-10 VITALS — WEIGHT: 266 LBS | BODY MASS INDEX: 48.95 KG/M2 | HEIGHT: 62 IN

## 2025-04-10 DIAGNOSIS — M47.817 SPONDYLOSIS OF LUMBOSACRAL REGION WITHOUT MYELOPATHY OR RADICULOPATHY: Primary | ICD-10-CM

## 2025-04-10 PROCEDURE — 99999 PR PBB SHADOW E&M-EST. PATIENT-LVL III: CPT | Mod: PBBFAC,,, | Performed by: STUDENT IN AN ORGANIZED HEALTH CARE EDUCATION/TRAINING PROGRAM

## 2025-04-10 NOTE — H&P (VIEW-ONLY)
[facility] - Department    Terrie Hartmann MD      First Office Visit: 2/12/25  Today' Date: 4/10/2025  Last Office Visit: 2/12/25    Chief complaint: back pain (L side)    HPI: Pt is a pleasant 54 y.o., who presents for evaluation. Referred by Dr. Hartmann. Pt seen previously for L sided back/buttock pain for mos. Pt seen today for follow-up from left SI joint injection.  States she had some relief but not complete relief.  Overall has noticed an improvement in her day-to-day function.  Does continue have some low back pain.  Occasionally has pain that goes down the front and at time the back of the L leg. States back pain stays mostly in the back and is worse with bending. No BB changes. Is doing HEP.         Pain disability index score: 49  Pain score: 7    Relevant Imaging/ Testing:   MR L-spine 1/25 - mild degenerative changes at L4-5  XR L-spine 10/24     Procedures: None    Date of board of pharmacy review:4/10/2025  Date of opioid risk screening/ pain psych: None  Date of opioid agreement and consent: None  Date of urine drug screen: None  Date of random pill count: None     was reviewed today: reviewed, no concerns     Prescribed medications: None    See EHR for  PMH, PSH, FH, SH, Medications and Allergy    ROS:  Positive for pain  ROS     PE:  There were no vitals filed for this visit.  General: Pleasant, no distress  HEENT: NC/ AT. PERRLA  CV: Radial pulses intact  Pulm: No distress  Ext: No edema    Physical Exam     Neuromusculoskeletal:  Head: NC, AT. PERRLA  Neck: Intact range of motions  Shoulder: Intact range of motion  Lumbar: Intact range of motion. Bilat Facet loading. Min Tenderness. Neg SL. No pain with flexion. Pain with extension.   Hip: Intact range of motion  SI: Level, L SIJ TTP, Pos ANETA's, gaenslens, and thigh thrust   Knee: Intact range of motion. Min Tenderness. No Swelling. Min Crepitis  Reflexes: normal Knee  Strength: 5/5 globally   Sensory: Grossly intact   Skin: No bruising,  erythema  Gait: Normal      Impression:  Back pain (L side)  Relevant History  BMI 48.83  Anxiety  H/o DVT   H/o bariatric surgery   Vit D deficiency     Assessment:  Axial back pain   L sacroiliitis  Discogenic back pain     Plan:  Discussed options  Imaging/ relevant records viewed/ reviewed/ discussed  Imaging results viewed and reviewed (noted above)/ reviewed with patient   reviewed  Cont HEP  B MBB L4-5 and L5-S1 - patient understands risk including bleeding, infection, damage to surrounding tissue  Re-eval after  L SIJ injection prn L sided low back pain   Consider viadisc L4-5       Prescribed medications:  1. None    The impression and plan were discussed and explained in detail. All the questions were answered. Education was provided accordingly.     The procedure was explained in detail, along with risks and potential side effects.    Follow-up:  For procedure     Aaliyah Rodriguez MD

## 2025-04-10 NOTE — PROGRESS NOTES
[facility] - Department    Terrie Hartmann MD      First Office Visit: 2/12/25  Today' Date: 4/10/2025  Last Office Visit: 2/12/25    Chief complaint: back pain (L side)    HPI: Pt is a pleasant 54 y.o., who presents for evaluation. Referred by Dr. Hartmann. Pt seen previously for L sided back/buttock pain for mos. Pt seen today for follow-up from left SI joint injection.  States she had some relief but not complete relief.  Overall has noticed an improvement in her day-to-day function.  Does continue have some low back pain.  Occasionally has pain that goes down the front and at time the back of the L leg. States back pain stays mostly in the back and is worse with bending. No BB changes. Is doing HEP.         Pain disability index score: 49  Pain score: 7    Relevant Imaging/ Testing:   MR L-spine 1/25 - mild degenerative changes at L4-5  XR L-spine 10/24     Procedures: None    Date of board of pharmacy review:4/10/2025  Date of opioid risk screening/ pain psych: None  Date of opioid agreement and consent: None  Date of urine drug screen: None  Date of random pill count: None     was reviewed today: reviewed, no concerns     Prescribed medications: None    See EHR for  PMH, PSH, FH, SH, Medications and Allergy    ROS:  Positive for pain  ROS     PE:  There were no vitals filed for this visit.  General: Pleasant, no distress  HEENT: NC/ AT. PERRLA  CV: Radial pulses intact  Pulm: No distress  Ext: No edema    Physical Exam     Neuromusculoskeletal:  Head: NC, AT. PERRLA  Neck: Intact range of motions  Shoulder: Intact range of motion  Lumbar: Intact range of motion. Bilat Facet loading. Min Tenderness. Neg SL. No pain with flexion. Pain with extension.   Hip: Intact range of motion  SI: Level, L SIJ TTP, Pos ANETA's, gaenslens, and thigh thrust   Knee: Intact range of motion. Min Tenderness. No Swelling. Min Crepitis  Reflexes: normal Knee  Strength: 5/5 globally   Sensory: Grossly intact   Skin: No bruising,  erythema  Gait: Normal      Impression:  Back pain (L side)  Relevant History  BMI 48.83  Anxiety  H/o DVT   H/o bariatric surgery   Vit D deficiency     Assessment:  Axial back pain   L sacroiliitis  Discogenic back pain     Plan:  Discussed options  Imaging/ relevant records viewed/ reviewed/ discussed  Imaging results viewed and reviewed (noted above)/ reviewed with patient   reviewed  Cont HEP  B MBB L4-5 and L5-S1 - patient understands risk including bleeding, infection, damage to surrounding tissue  Re-eval after  L SIJ injection prn L sided low back pain   Consider viadisc L4-5       Prescribed medications:  1. None    The impression and plan were discussed and explained in detail. All the questions were answered. Education was provided accordingly.     The procedure was explained in detail, along with risks and potential side effects.    Follow-up:  For procedure     Aaliyah Rodriguez MD

## 2025-04-11 ENCOUNTER — TELEPHONE (OUTPATIENT)
Dept: PAIN MEDICINE | Facility: CLINIC | Age: 55
End: 2025-04-11
Payer: COMMERCIAL

## 2025-04-11 DIAGNOSIS — M47.816 LUMBAR SPONDYLOSIS: ICD-10-CM

## 2025-04-11 DIAGNOSIS — M47.817 SPONDYLOSIS OF LUMBOSACRAL REGION WITHOUT MYELOPATHY OR RADICULOPATHY: Primary | ICD-10-CM

## 2025-04-11 RX ORDER — SODIUM CHLORIDE, SODIUM LACTATE, POTASSIUM CHLORIDE, CALCIUM CHLORIDE 600; 310; 30; 20 MG/100ML; MG/100ML; MG/100ML; MG/100ML
INJECTION, SOLUTION INTRAVENOUS CONTINUOUS
OUTPATIENT
Start: 2025-04-11

## 2025-04-11 NOTE — TELEPHONE ENCOUNTER
Types of orders made on 04/10/2025: Procedure Request      Order Date:4/10/2025   Ordering User:SHARYN VICTOR [[id number]]   Encounter Provider:Sharyn Victor MD [[id number]]   Authorizing Provider: Sharyn Victor MD [[id number]]   Department:[facility][[id number]]      Common Order Information   Procedure -> Medial Branch Block (Specify level and laterality) Cmt: B MBB L4-5             and L5-S1 (Cov)      Order Specific Information   Order: Procedure Request Order for Pain Management [Custom: [id number]]  Order #:          [id number]: 1 FUTURE     Priority: Routine  Class: Clinic Performed     Future Order Information       Expires on:04/10/2026            Expected by:04/10/2025                   Associated Diagnoses       M47.817 Spondylosis of lumbosacral region without myelopathy or       radiculopathy       Facility Name: -> [place]          Follow-up: -> 2 weeks              Priority: Routine  Class: Clinic Performed     Future Order Information       Expires on:04/10/2026            Expected by:04/10/2025                   Associated Diagnoses       M47.817 Spondylosis of lumbosacral region without myelopathy or       radiculopathy       Procedure -> Medial Branch Block (Specify level and laterality) Cmt: B MBB                 L4-5 and L5-S1 (Cov)

## 2025-04-13 DIAGNOSIS — L30.9 DERMATITIS: ICD-10-CM

## 2025-04-15 RX ORDER — TACROLIMUS 1 MG/G
OINTMENT TOPICAL 2 TIMES DAILY
Qty: 60 G | Refills: 1 | Status: SHIPPED | OUTPATIENT
Start: 2025-04-15

## 2025-04-19 DIAGNOSIS — K91.0 VOMITING (BILIOUS) FOLLOWING GASTROINTESTINAL SURGERY: ICD-10-CM

## 2025-04-19 DIAGNOSIS — Z98.84 HISTORY OF ROUX-EN-Y GASTRIC BYPASS: ICD-10-CM

## 2025-04-19 NOTE — TELEPHONE ENCOUNTER
No care due was identified.  [facility] Embedded Care Due Messages. Reference number: [id number].   4/19/2025 6:55:58 AM CDT

## 2025-04-20 RX ORDER — OMEPRAZOLE 40 MG/1
40 CAPSULE, DELAYED RELEASE ORAL EVERY MORNING
Qty: 90 CAPSULE | Refills: 3 | Status: SHIPPED | OUTPATIENT
Start: 2025-04-20

## 2025-04-21 ENCOUNTER — OFFICE VISIT (OUTPATIENT)
Dept: FAMILY MEDICINE | Facility: CLINIC | Age: 55
End: 2025-04-21
Payer: COMMERCIAL

## 2025-04-21 VITALS
DIASTOLIC BLOOD PRESSURE: 66 MMHG | HEART RATE: 61 BPM | SYSTOLIC BLOOD PRESSURE: 128 MMHG | OXYGEN SATURATION: 98 % | WEIGHT: 269.19 LBS | TEMPERATURE: 98 F | BODY MASS INDEX: 49.54 KG/M2 | RESPIRATION RATE: 18 BRPM | HEIGHT: 62 IN

## 2025-04-21 DIAGNOSIS — M25.512 ACUTE PAIN OF LEFT SHOULDER: Primary | ICD-10-CM

## 2025-04-21 PROCEDURE — 99213 OFFICE O/P EST LOW 20 MIN: CPT | Mod: S$GLB,,, | Performed by: NURSE PRACTITIONER

## 2025-04-21 PROCEDURE — 99999 PR PBB SHADOW E&M-EST. PATIENT-LVL V: CPT | Mod: PBBFAC,,, | Performed by: NURSE PRACTITIONER

## 2025-04-21 RX ORDER — SULINDAC 200 MG/1
200 TABLET ORAL 2 TIMES DAILY
Qty: 14 TABLET | Refills: 0 | Status: SHIPPED | OUTPATIENT
Start: 2025-04-21 | End: 2025-04-28

## 2025-04-21 RX ORDER — PREDNISONE 20 MG/1
20 TABLET ORAL 2 TIMES DAILY
Qty: 10 TABLET | Refills: 0 | Status: SHIPPED | OUTPATIENT
Start: 2025-04-21 | End: 2025-04-26

## 2025-04-21 RX ORDER — METHOCARBAMOL 500 MG/1
500 TABLET, FILM COATED ORAL 2 TIMES DAILY PRN
Qty: 20 TABLET | Refills: 0 | Status: SHIPPED | OUTPATIENT
Start: 2025-04-21 | End: 2025-05-01

## 2025-04-21 NOTE — PROGRESS NOTES
Subjective:       Patient ID: Felicia Drew is a 54 y.o. female.    Chief Complaint: Shoulder Pain (Pt reports left shoulder pain for 1 week. Pt denies any fall or injury)    HPI    Ms. Drew presents today for right shoulder pain    MUSCULOSKELETAL - RIGHT SHOULDER PAIN:  She reports right shoulder pain that began last Tuesday, radiating down to the elbow with soreness in the upper shoulder area. She has limited range of motion affecting activities of daily living, particularly noting difficulty with putting hair in a ponytail and putting on a bra. She attributes possible onset to stretching to grab phone, though symptoms did not manifest until several days after the incident. She takes ibuprofen 4 tablets every 6 hours for pain management.    ALLERGIES:  She has an allergy to erythromycin.      ROS:  General: -fever, -chills, -fatigue, -weight gain, -weight loss, -loss of appetite  Eyes: -vision changes, -blurry vision, -eye pain, -eye discharge  ENT: -ear pain, -hearing loss, -tinnitus, -nasal congestion, -sore throat  Cardiovascular: -chest pain, -palpitations, -lower extremity edema  Respiratory: -cough, -shortness of breath, -wheezing, -sputum production  Endocrine: -polyuria, -polydipsia, -heat intolerance, -cold intolerance  Gastrointestinal: -abdominal pain, -heartburn, -nausea, -vomiting, -diarrhea, -constipation, -blood in stool  Genitourinary: -dysuria, -urgency, -frequency, -hematuria, -nocturia, -incontinence  Heme & Lymphatic: -easy or excessive bleeding, -easy bruising, -swollen lymph nodes  Musculoskeletal: +muscle pain, -back pain, -joint pain, -joint swelling, +pain with movement, +limb pain  Skin: -rash, -lesion, -itching, -skin texture changes, -skin color changes  Neurological: +headache, -dizziness, -numbness, -tingling, -seizure activity, -speech difficulty, -memory loss, -confusion , +migraines  Psychiatric: -anxiety, -depression, -sleep difficulty       Vitals:    04/21/25 0957    BP: 128/66   Pulse: 61   Resp: 18   Temp: 97.7 °F (36.5 °C)       Objective:   Current Medications[1]    Physical Exam  Constitutional:       General: She is not in acute distress.     Appearance: She is well-developed.   HENT:      Head: Normocephalic.      Mouth/Throat:      Mouth: Mucous membranes are moist.   Pulmonary:      Effort: Pulmonary effort is normal. No respiratory distress.   Musculoskeletal:      Left shoulder: Tenderness present. Decreased range of motion. Decreased strength.      Left upper arm: Tenderness present.      Cervical back: Normal range of motion.   Neurological:      Mental Status: She is alert and oriented to person, place, and time.   Psychiatric:         Thought Content: Thought content normal.         Judgment: Judgment normal.         Assessment:       1. Acute pain of left shoulder        Plan:     MEDICATIONS:  - Ms. Drew to take medications as prescribed, with food.  -     predniSONE (DELTASONE) 20 MG tablet; Take 1 tablet (20 mg total) by mouth 2 (two) times daily. for 5 days  Dispense: 10 tablet; Refill: 0  -     sulindac (CLINORIL) 200 MG Tab; Take 1 tablet (200 mg total) by mouth 2 (two) times daily. for 7 days  Dispense: 14 tablet; Refill: 0  -     methocarbamoL (ROBAXIN) 500 MG Tab; Take 1 tablet (500 mg total) by mouth 2 (two) times daily as needed (muscle pain).  Dispense: 20 tablet; Refill: 0    - Discontinued ibuprofen.  - Continued Tylenol as needed for pain.    FOLLOW UP:  - Follow up in 1 week if symptoms do not improve.       This note was generated with the assistance of ambient listening technology. Verbal consent was obtained by the patient and accompanying visitor(s) for the recording of patient appointment to facilitate this note. I attest to having reviewed and edited the generated note for accuracy, though some syntax or spelling errors may persist. Please contact the author of this note for any clarification.    Follow up if symptoms worsen or fail to  improve, for Routine scheduled appointment.    There are no Patient Instructions on file for this visit.         [1]   Current Outpatient Medications   Medication Sig Dispense Refill    acetaminophen (TYLENOL) 500 MG tablet Take 1 tablet (500 mg total) by mouth every 6 (six) hours as needed for Pain.  0    cholecalciferol, vitamin D3, 1,250 mcg (50,000 unit) capsule Take 1 capsule (50,000 Units total) by mouth every 7 days. 12 capsule 3    cyanocobalamin, vitamin B-12, (VITAMIN B-12) 2,500 mcg Subl Place 1 tablet under the tongue once a week. 90 each 3    ferrous sulfate 325 (65 FE) MG EC tablet Take 1 tablet (325 mg total) by mouth once daily. 90 tablet 3    mometasone (ELOCON) 0.1 % ointment Apply topically once daily. 45 g 2    multivitamin capsule Take 3 capsules by mouth 3 (three) times daily.      nystatin (NYSTOP) powder Apply topically 3 (three) times daily. Apply to affected area 1 Bottle 3    omeprazole (PRILOSEC) 40 MG capsule TAKE 1 CAPSULE (40 MG TOTAL) BY MOUTH EVERY MORNING. 90 capsule 3    ondansetron (ZOFRAN-ODT) 4 MG TbDL Take 2 tablets (8 mg total) by mouth every 8 (eight) hours as needed (nausea). 30 tablet 0    pregabalin (LYRICA) 50 MG capsule Take 1 capsule (50 mg total) by mouth 2 (two) times daily. 60 capsule 0    semaglutide, weight loss, (WEGOVY) 1.7 mg/0.75 mL PnIj Inject 1.7 mg into the skin every 7 days. 3 mL 2    tacrolimus (PROTOPIC) 0.1 % ointment APPLY TOPICALLY 2 (TWO) TIMES DAILY 60 g 1    traMADoL (ULTRAM) 50 mg tablet Take 1 tablet (50 mg total) by mouth every 6 (six) hours as needed for Pain. 30 tablet 0    tretinoin (RETIN-A) 0.025 % cream Apply topically every evening. 45 g 3    triamcinolone acetonide 0.1% (KENALOG) 0.1 % cream APPLY TOPICALLY TWICE A DAY 30 g 0    methocarbamoL (ROBAXIN) 500 MG Tab Take 1 tablet (500 mg total) by mouth 2 (two) times daily as needed (muscle pain). 20 tablet 0    predniSONE (DELTASONE) 20 MG tablet Take 1 tablet (20 mg total) by mouth 2 (two)  times daily. for 5 days 10 tablet 0    sulindac (CLINORIL) 200 MG Tab Take 1 tablet (200 mg total) by mouth 2 (two) times daily. for 7 days 14 tablet 0     No current facility-administered medications for this visit.

## 2025-04-21 NOTE — TELEPHONE ENCOUNTER
Refill Decision Note   Felicia Drew  is requesting a refill authorization.  Brief Assessment and Rationale for Refill:  Approve     Medication Therapy Plan:       Medication Reconciliation Completed: No   Comments:     No Care Gaps recommended.     Note composed:7:15 PM 04/20/2025

## 2025-04-29 RX ORDER — METHOCARBAMOL 500 MG/1
500 TABLET, FILM COATED ORAL 2 TIMES DAILY PRN
Qty: 20 TABLET | Refills: 0 | Status: SHIPPED | OUTPATIENT
Start: 2025-04-29 | End: 2025-05-09

## 2025-04-29 RX ORDER — SULINDAC 200 MG/1
200 TABLET ORAL 2 TIMES DAILY
Qty: 14 TABLET | Refills: 0 | Status: SHIPPED | OUTPATIENT
Start: 2025-04-29 | End: 2025-05-06

## 2025-05-08 ENCOUNTER — HOSPITAL ENCOUNTER (OUTPATIENT)
Facility: HOSPITAL | Age: 55
Discharge: HOME | End: 2025-05-08
Attending: STUDENT IN AN ORGANIZED HEALTH CARE EDUCATION/TRAINING PROGRAM | Admitting: STUDENT IN AN ORGANIZED HEALTH CARE EDUCATION/TRAINING PROGRAM
Payer: COMMERCIAL

## 2025-05-08 ENCOUNTER — HOSPITAL ENCOUNTER (OUTPATIENT)
Dept: RADIOLOGY | Facility: HOSPITAL | Age: 55
Discharge: HOME | End: 2025-05-08
Attending: STUDENT IN AN ORGANIZED HEALTH CARE EDUCATION/TRAINING PROGRAM | Admitting: STUDENT IN AN ORGANIZED HEALTH CARE EDUCATION/TRAINING PROGRAM
Payer: COMMERCIAL

## 2025-05-08 VITALS
HEART RATE: 74 BPM | BODY MASS INDEX: 49.5 KG/M2 | WEIGHT: 269 LBS | HEIGHT: 62 IN | TEMPERATURE: 98 F | DIASTOLIC BLOOD PRESSURE: 67 MMHG | RESPIRATION RATE: 16 BRPM | SYSTOLIC BLOOD PRESSURE: 135 MMHG | OXYGEN SATURATION: 98 %

## 2025-05-08 DIAGNOSIS — M47.816 LUMBAR SPONDYLOSIS: ICD-10-CM

## 2025-05-08 DIAGNOSIS — M47.817 SPONDYLOSIS OF LUMBOSACRAL REGION WITHOUT MYELOPATHY OR RADICULOPATHY: ICD-10-CM

## 2025-05-08 DIAGNOSIS — M54.50 LOWER BACK PAIN: ICD-10-CM

## 2025-05-08 PROCEDURE — 64493 INJ PARAVERT F JNT L/S 1 LEV: CPT | Mod: 50,PO | Performed by: STUDENT IN AN ORGANIZED HEALTH CARE EDUCATION/TRAINING PROGRAM

## 2025-05-08 PROCEDURE — 64493 INJ PARAVERT F JNT L/S 1 LEV: CPT | Mod: 50,,, | Performed by: STUDENT IN AN ORGANIZED HEALTH CARE EDUCATION/TRAINING PROGRAM

## 2025-05-08 PROCEDURE — 63600175 PHARM REV CODE 636 W HCPCS: Mod: PO | Performed by: STUDENT IN AN ORGANIZED HEALTH CARE EDUCATION/TRAINING PROGRAM

## 2025-05-08 PROCEDURE — 64494 INJ PARAVERT F JNT L/S 2 LEV: CPT | Mod: 50,,, | Performed by: STUDENT IN AN ORGANIZED HEALTH CARE EDUCATION/TRAINING PROGRAM

## 2025-05-08 PROCEDURE — 64494 INJ PARAVERT F JNT L/S 2 LEV: CPT | Mod: 50,PO | Performed by: STUDENT IN AN ORGANIZED HEALTH CARE EDUCATION/TRAINING PROGRAM

## 2025-05-08 RX ORDER — BUPIVACAINE HYDROCHLORIDE 2.5 MG/ML
INJECTION, SOLUTION EPIDURAL; INFILTRATION; INTRACAUDAL; PERINEURAL
Status: DISCONTINUED | OUTPATIENT
Start: 2025-05-08 | End: 2025-05-08 | Stop reason: HOSPADM

## 2025-05-08 RX ORDER — LIDOCAINE HYDROCHLORIDE 10 MG/ML
INJECTION, SOLUTION EPIDURAL; INFILTRATION; INTRACAUDAL; PERINEURAL
Status: DISCONTINUED | OUTPATIENT
Start: 2025-05-08 | End: 2025-05-08 | Stop reason: HOSPADM

## 2025-05-08 RX ORDER — SODIUM CHLORIDE, SODIUM LACTATE, POTASSIUM CHLORIDE, CALCIUM CHLORIDE 600; 310; 30; 20 MG/100ML; MG/100ML; MG/100ML; MG/100ML
INJECTION, SOLUTION INTRAVENOUS CONTINUOUS
Status: DISCONTINUED | OUTPATIENT
Start: 2025-05-08 | End: 2025-05-08 | Stop reason: HOSPADM

## 2025-05-08 RX ORDER — MIDAZOLAM HYDROCHLORIDE 1 MG/ML
INJECTION INTRAMUSCULAR; INTRAVENOUS
Status: DISCONTINUED | OUTPATIENT
Start: 2025-05-08 | End: 2025-05-08 | Stop reason: HOSPADM

## 2025-05-08 NOTE — DISCHARGE SUMMARY
[facility] - Surgery  Discharge Note  Short Stay    Procedure(s) (LRB):  Block-nerve-medial branch-lumbar l4/5 and l5/s1 MBB ( Iv sed) (Bilateral)      OUTCOME: Patient tolerated treatment/procedure well without complication and is now ready for discharge.    DISPOSITION: Home or Self Care    FINAL DIAGNOSIS:  <principal problem not specified>    FOLLOWUP: In clinic    DISCHARGE INSTRUCTIONS:    Discharge Procedure Orders   Notify your health care provider if you experience any of the following:  temperature >100.4     Notify your health care provider if you experience any of the following:  severe uncontrolled pain     Notify your health care provider if you experience any of the following:  redness, tenderness, or signs of infection (pain, swelling, redness, odor or green/yellow discharge around incision site)     Activity as tolerated        TIME SPENT ON DISCHARGE: 20 minutes

## 2025-05-08 NOTE — OP NOTE
Patient: Felicia Drew                                                    MRN: [id number]  : 1970                                              Date of procedure: 2025        Pre Procedure Diagnosis: Back pain, Lumbar spondylosis without myelopathy/ lumbosacral region    Post Procedure Diagnosis: Same    Procedure: Bilateral Lumbar Medial Branch Nerve Block for L4-5 and L5-S1 joints under Fluoroscopy     Attending: Aaliyah Rodriguez MD    Local Anesthetic Injected: 1% Lidocaine 10 ml    Anxiolysis Medications: Versed    Estimated Blood Loss: None    Complication: None        Procedure:  After informed consent was obtained, patient was taken to the fluoroscopy suite and placed in a prone position.  The skin was prepped and draped in the usual sterile fashion using chlorhexidine.  Anatomical landmarks were identified with the aid of fluoroscopy in PA and Oblique views, and the skin and subcutaneous tissue were infiltrated with a total of 10mL of 1% Lidocaine via a 25-gauge 1.5inch needle at each of the intended entry sites.  Subsequently, three 22-gauge 3.5inch needles were advanced with the aid of fluoroscopy such that their tips were located at the respective positions of the superior medial border of the transverse processes with the posterior elements at each level.  Needle placement was confirmed with the aid of fluoroscopy.  After negative aspiration, 0.5mL of 0.25% Bupivicaine was injected at each level.  This was repeated on the other side. There were no complications or paresthesias.   Patient tolerated the procedure well and all needles were removed intact.    Patient was observed in recovery and discharged home with written instruction under supervision in stable condition.

## 2025-05-09 ENCOUNTER — TELEPHONE (OUTPATIENT)
Dept: PAIN MEDICINE | Facility: CLINIC | Age: 55
End: 2025-05-09
Payer: COMMERCIAL

## 2025-05-09 DIAGNOSIS — M47.816 LUMBAR SPONDYLOSIS: ICD-10-CM

## 2025-05-09 DIAGNOSIS — M47.817 SPONDYLOSIS OF LUMBOSACRAL REGION WITHOUT MYELOPATHY OR RADICULOPATHY: Primary | ICD-10-CM

## 2025-05-09 RX ORDER — SODIUM CHLORIDE, SODIUM LACTATE, POTASSIUM CHLORIDE, CALCIUM CHLORIDE 600; 310; 30; 20 MG/100ML; MG/100ML; MG/100ML; MG/100ML
INJECTION, SOLUTION INTRAVENOUS CONTINUOUS
OUTPATIENT
Start: 2025-05-09

## 2025-05-09 NOTE — TELEPHONE ENCOUNTER
Spoke with pt and she states she had a 80% relief x 3 hours her pain score before procedure was 5 and after procedure a 2 her current pain score is a 5 scheduled for MBB #2 05/29 in [place] [address]

## 2025-05-11 DIAGNOSIS — L30.9 DERMATITIS: ICD-10-CM

## 2025-05-15 RX ORDER — MOMETASONE FUROATE 1 MG/G
1 OINTMENT TOPICAL
Qty: 45 G | Refills: 0 | Status: SHIPPED | OUTPATIENT
Start: 2025-05-15

## 2025-05-21 DIAGNOSIS — E66.813 CLASS 3 SEVERE OBESITY DUE TO EXCESS CALORIES WITH SERIOUS COMORBIDITY AND BODY MASS INDEX (BMI) OF 45.0 TO 49.9 IN ADULT: ICD-10-CM

## 2025-05-21 DIAGNOSIS — E66.01 CLASS 3 SEVERE OBESITY DUE TO EXCESS CALORIES WITH SERIOUS COMORBIDITY AND BODY MASS INDEX (BMI) OF 45.0 TO 49.9 IN ADULT: ICD-10-CM

## 2025-05-21 DIAGNOSIS — Z98.84 S/P BARIATRIC SURGERY: ICD-10-CM

## 2025-05-21 DIAGNOSIS — Z71.3 ENCOUNTER FOR WEIGHT LOSS COUNSELING: ICD-10-CM

## 2025-05-21 RX ORDER — SEMAGLUTIDE 1.7 MG/.75ML
1.7 INJECTION, SOLUTION SUBCUTANEOUS
Qty: 3 ML | Refills: 2 | Status: SHIPPED | OUTPATIENT
Start: 2025-05-21

## 2025-05-29 ENCOUNTER — HOSPITAL ENCOUNTER (OUTPATIENT)
Dept: RADIOLOGY | Facility: HOSPITAL | Age: 55
Discharge: HOME | End: 2025-05-29
Attending: STUDENT IN AN ORGANIZED HEALTH CARE EDUCATION/TRAINING PROGRAM | Admitting: STUDENT IN AN ORGANIZED HEALTH CARE EDUCATION/TRAINING PROGRAM
Payer: COMMERCIAL

## 2025-05-29 ENCOUNTER — HOSPITAL ENCOUNTER (OUTPATIENT)
Facility: HOSPITAL | Age: 55
Discharge: HOME | End: 2025-05-29
Attending: STUDENT IN AN ORGANIZED HEALTH CARE EDUCATION/TRAINING PROGRAM | Admitting: STUDENT IN AN ORGANIZED HEALTH CARE EDUCATION/TRAINING PROGRAM
Payer: COMMERCIAL

## 2025-05-29 VITALS
DIASTOLIC BLOOD PRESSURE: 73 MMHG | OXYGEN SATURATION: 98 % | TEMPERATURE: 98 F | HEART RATE: 67 BPM | SYSTOLIC BLOOD PRESSURE: 122 MMHG | WEIGHT: 269 LBS | RESPIRATION RATE: 20 BRPM | HEIGHT: 62 IN | BODY MASS INDEX: 49.5 KG/M2

## 2025-05-29 DIAGNOSIS — M54.50 LOWER BACK PAIN: ICD-10-CM

## 2025-05-29 DIAGNOSIS — M47.816 LUMBAR SPONDYLOSIS: ICD-10-CM

## 2025-05-29 DIAGNOSIS — M47.817 SPONDYLOSIS OF LUMBOSACRAL REGION WITHOUT MYELOPATHY OR RADICULOPATHY: ICD-10-CM

## 2025-05-29 PROCEDURE — 63600175 PHARM REV CODE 636 W HCPCS: Mod: PO | Performed by: STUDENT IN AN ORGANIZED HEALTH CARE EDUCATION/TRAINING PROGRAM

## 2025-05-29 PROCEDURE — 64493 INJ PARAVERT F JNT L/S 1 LEV: CPT | Mod: 50,PO | Performed by: STUDENT IN AN ORGANIZED HEALTH CARE EDUCATION/TRAINING PROGRAM

## 2025-05-29 PROCEDURE — 64494 INJ PARAVERT F JNT L/S 2 LEV: CPT | Mod: 50,PO | Performed by: STUDENT IN AN ORGANIZED HEALTH CARE EDUCATION/TRAINING PROGRAM

## 2025-05-29 PROCEDURE — 64494 INJ PARAVERT F JNT L/S 2 LEV: CPT | Mod: 50,,, | Performed by: STUDENT IN AN ORGANIZED HEALTH CARE EDUCATION/TRAINING PROGRAM

## 2025-05-29 PROCEDURE — 64493 INJ PARAVERT F JNT L/S 1 LEV: CPT | Mod: 50,,, | Performed by: STUDENT IN AN ORGANIZED HEALTH CARE EDUCATION/TRAINING PROGRAM

## 2025-05-29 RX ORDER — LIDOCAINE HYDROCHLORIDE 10 MG/ML
INJECTION, SOLUTION EPIDURAL; INFILTRATION; INTRACAUDAL; PERINEURAL
Status: DISCONTINUED | OUTPATIENT
Start: 2025-05-29 | End: 2025-05-29 | Stop reason: HOSPADM

## 2025-05-29 RX ORDER — BUPIVACAINE HYDROCHLORIDE 2.5 MG/ML
INJECTION, SOLUTION EPIDURAL; INFILTRATION; INTRACAUDAL; PERINEURAL
Status: DISCONTINUED | OUTPATIENT
Start: 2025-05-29 | End: 2025-05-29 | Stop reason: HOSPADM

## 2025-05-29 RX ORDER — MIDAZOLAM HYDROCHLORIDE 1 MG/ML
INJECTION INTRAMUSCULAR; INTRAVENOUS
Status: DISCONTINUED | OUTPATIENT
Start: 2025-05-29 | End: 2025-05-29 | Stop reason: HOSPADM

## 2025-05-29 RX ORDER — SODIUM CHLORIDE, SODIUM LACTATE, POTASSIUM CHLORIDE, CALCIUM CHLORIDE 600; 310; 30; 20 MG/100ML; MG/100ML; MG/100ML; MG/100ML
INJECTION, SOLUTION INTRAVENOUS CONTINUOUS
Status: DISCONTINUED | OUTPATIENT
Start: 2025-05-29 | End: 2025-05-29 | Stop reason: HOSPADM

## 2025-05-29 NOTE — H&P (VIEW-ONLY)
CC: back pain    HPI: The patient is a 55 y.o. female with a history of back pain here for B MBB L4-5 and L5-S1. There are no major changes in history and physical from 4/10/25 by Dr. Rodriguez.    Past Medical History:   Diagnosis Date    H/O hysterectomy for benign disease with BSO 2017    Morbid obesity     Varicose veins     Vitamin D deficiency        Past Surgical History:   Procedure Laterality Date     SECTION      CHOLECYSTECTOMY      COLONOSCOPY N/A 2020    Procedure: COLONOSCOPY;  Surgeon: Sean Ward MD;  [facility]  Service: Endoscopy;  Laterality: N/A;  screening colonooscpy, 49 years old.  siblings with colon polyps   - pt updated on visitor policy and drop off location.  COVID screening ordered for  at [facility] [place] - rb    COLONOSCOPY N/A 2024    Procedure: COLONOSCOPY;  Surgeon: Jhoan Mcdonald MD;  Location: [facility];  Service: Gastroenterology;  Laterality: N/A;    ESOPHAGOGASTRODUODENOSCOPY N/A 2020    Procedure: ESOPHAGOGASTRODUODENOSCOPY (EGD);  Surgeon: Sean Ward MD;  Location: [facility] ([address] [place] [address]);  Service: Endoscopy;  Laterality: N/A;  BMI 51.77  Re-scheduled per md recommendations-new instructions mailed-tb    GASTRIC BYPASS      ZACK FILTER PLACEMENT      zack filter removal      HYSTERECTOMY      DANNA with BSO;    INJECTION OF ANESTHETIC AGENT AROUND MEDIAL BRANCH NERVES INNERVATING LUMBAR FACET JOINT Bilateral 2025    Procedure: Block-nerve-medial branch-lumbar l4/5 and l5/s1 MBB ( Iv sed);  Surgeon: Aaliyah Rodriguez MD;  Location: [facility] OR;  Service: Anesthesiology;  Laterality: Bilateral;    INJECTION, SACROILIAC JOINT Left 3/13/2025    Procedure: INJECTION,SACROILIAC JOINT ( oral sedation);  Surgeon: Aaliyah Rodriguez MD;  Location: [facility] OR;  Service: Pain Management;  Laterality: Left;    lap band removal       LAPAROSCOPIC GASTRIC BANDING      OOPHORECTOMY      w/hysteretomy @ age 41    TONSILLECTOMY       VASCULAR SURGERY         Family History   Problem Relation Name Age of Onset    Aortic aneurysm Mother Pat     Diabetes Mother Pat     Lung cancer Father Srinivas 70 - 79        quit tobacco earlier    Hyperlipidemia Sister Anuja     Hypertension Sister Anuja     BAYLEE disease Sister Anuja     Rheum arthritis Sister Anuja     Aortic aneurysm Sister Anuja     Melanoma Brother Juarez     Stomach cancer Brother Juarez 69        stomach, liver, esophageal    COPD Brother Juarez         emphysema; quit tobacco;    Drug abuse Brother Juarez     Aortic aneurysm Brother Juarez     Dementia Brother Juarez         hx of hypoxia s/p aspiration PNA    Aortic aneurysm Brother Harshad         had repaired    Drug abuse Brother Harshad     Pancreatic cancer Maternal Grandfather  60 - 69    Stomach cancer Paternal Uncle  30 - 39    Stomach cancer Maternal Aunt  69       Social History     Socioeconomic History    Marital status:     Number of children: 2   Tobacco Use    Smoking status: Former     Current packs/day: 0.00     Types: Cigarettes     Start date: 1995     Quit date:      Years since quittin.9    Smokeless tobacco: Never   Substance and Sexual Activity    Alcohol use: Yes     Alcohol/week: 1.0 standard drink of alcohol     Types: 1 Glasses of wine per week     Comment: socially    Drug use: No    Sexual activity: Yes     Partners: Male     Birth control/protection: Other-see comments     Comment: Hysterectomy     Social Drivers of Health     Financial Resource Strain: Low Risk  (2024)    Overall Financial Resource Strain (CARDIA)     Difficulty of Paying Living Expenses: Not very hard   Food Insecurity: No Food Insecurity (2024)    Hunger Vital Sign     Worried About Running Out of Food in the Last Year: Never true     Ran Out of Food in the Last Year: Never true   Transportation Needs: No Transportation Needs (2024)    PRAPARE - Transportation     Lack of Transportation (Medical): No      "Lack of Transportation (Non-Medical): No   Physical Activity: Insufficiently Active (2/8/2024)    Exercise Vital Sign     Days of Exercise per Week: 2 days     Minutes of Exercise per Session: 30 min   Stress: Stress Concern Present (2/8/2024)    [employer] - Occupational Stress Questionnaire     Feeling of Stress : To some extent   Housing Stability: High Risk (2/8/2024)    Housing Stability Vital Sign     Unable to Pay for Housing in the Last Year: Yes     Number of Places Lived in the Last Year: 1     Unstable Housing in the Last Year: No       Current Medications[1]    Review of patient's allergies indicates:   Allergen Reactions    Brompheniramin-phenylephrin-dm      Other reaction(s): Other (See Comments)  Per patient, caused dizziness + blurry vision     Erythromycin Rash    Erythromycin (bulk) Rash       Vitals:    05/23/25 1228   Weight: 122 kg (269 lb)   Height: 5' 2" (1.575 m)       REVIEW OF SYSTEMS:     GENERAL: No weight loss, malaise or fevers.  HEENT:  No recent changes in vision or hearing  NECK: Negative for lumps, no difficulty with swallowing.  RESPIRATORY: Negative for cough, wheezing or shortness of breath, patient denies any recent URI.  CARDIOVASCULAR: Negative for chest pain, leg swelling or palpitations.  GI: Negative for abdominal discomfort, blood in stools or black stools or change in bowel habits.  MUSCULOSKELETAL: See HPI.  SKIN: Negative for lesions, rash, and itching.  PSYCH: No suicidal or homicidal ideations, no current mood disturbances.  HEMATOLOGY/LYMPHOLOGY: Negative for prolonged bleeding, bruising easily or swollen nodes. Patient is not currently taking any anti-coagulants  ENDO: No history of diabetes or thyroid dysfunction  NEURO: No history of syncope, paralysis, seizures or tremors.All other reviewed and negative other than HPI.    Physical exam:  Gen: A and O x3, pleasant, well-groomed  Skin: No rashes or obvious lesions  HEENT: PERRLA, no " obvious deformities on ears or in canals. No thyroid masses, trachea midline, no palpable lymph nodes in neck, axilla.  CVS: Regular rate and rhythm, normal S1 and S2, no murmurs.  Resp: Clear to auscultation bilaterally.  Abdomen: Soft, NT/ND, normal bowel sounds present.  Musculoskeletal/Neuro: Moving all extremities    Assessment:  There are no diagnoses linked to this encounter.      PLAN: B MBB L4-5 and L5-S1       This patient has been cleared for surgery in an ambulatory surgical facility    ASA 3,  Mallampatti Score 3  No history of anesthetic complications  Plan for RN IV sedation        [1]   No current facility-administered medications for this encounter.

## 2025-05-30 ENCOUNTER — TELEPHONE (OUTPATIENT)
Dept: PAIN MEDICINE | Facility: CLINIC | Age: 55
End: 2025-05-30
Payer: COMMERCIAL

## 2025-05-30 DIAGNOSIS — M47.817 SPONDYLOSIS OF LUMBOSACRAL REGION WITHOUT MYELOPATHY OR RADICULOPATHY: Primary | ICD-10-CM

## 2025-05-30 DIAGNOSIS — M47.816 LUMBAR SPONDYLOSIS: ICD-10-CM

## 2025-05-30 RX ORDER — SODIUM CHLORIDE, SODIUM LACTATE, POTASSIUM CHLORIDE, CALCIUM CHLORIDE 600; 310; 30; 20 MG/100ML; MG/100ML; MG/100ML; MG/100ML
INJECTION, SOLUTION INTRAVENOUS CONTINUOUS
OUTPATIENT
Start: 2025-05-30

## 2025-05-30 NOTE — TELEPHONE ENCOUNTER
Spoke with pt and she states she had 80% relief from MBB #2  her starting pain score was a 7 and ending pain score 2 and  pain now is a 7 scheduled  for RFA 06/12 in [place] . Instructions given

## 2025-06-03 ENCOUNTER — OFFICE VISIT (OUTPATIENT)
Dept: OBSTETRICS AND GYNECOLOGY | Facility: CLINIC | Age: 55
End: 2025-06-03
Payer: COMMERCIAL

## 2025-06-03 VITALS
BODY MASS INDEX: 50.95 KG/M2 | WEIGHT: 276.88 LBS | HEIGHT: 62 IN | DIASTOLIC BLOOD PRESSURE: 84 MMHG | SYSTOLIC BLOOD PRESSURE: 122 MMHG

## 2025-06-03 DIAGNOSIS — R21 RASH: Primary | ICD-10-CM

## 2025-06-03 PROCEDURE — 99999 PR PBB SHADOW E&M-EST. PATIENT-LVL III: CPT | Mod: PBBFAC,,, | Performed by: OBSTETRICS & GYNECOLOGY

## 2025-06-03 PROCEDURE — 99203 OFFICE O/P NEW LOW 30 MIN: CPT | Mod: S$GLB,,, | Performed by: OBSTETRICS & GYNECOLOGY

## 2025-06-03 RX ORDER — PRENATAL VIT 91/IRON/FOLIC/DHA 28-975-200
COMBINATION PACKAGE (EA) ORAL NIGHTLY
Qty: 15 G | Refills: 0 | Status: SHIPPED | OUTPATIENT
Start: 2025-06-03

## 2025-06-09 ENCOUNTER — TELEPHONE (OUTPATIENT)
Dept: PAIN MEDICINE | Facility: CLINIC | Age: 55
End: 2025-06-09
Payer: COMMERCIAL

## 2025-06-09 NOTE — TELEPHONE ENCOUNTER
Aaliyah Rodriguez MD [place], Tiffany Ann, LPN  Let's schedule the P2P. TY. MK          Previous Messages       ----- Message -----  From: Alanna Sosa LPN  Sent: 6/9/2025   1:40 PM CDT  To: MD Dr Jennifer Sullivan the denial letter says that the MRI must show no prior fusion and the need for spine surgery not warranted please advise. Pt procedure 6/12 denied. We can do a p2p. Please let me know.  ----- Message -----  From: Silvia Sanchez  Sent: 6/9/2025   1:22 PM CDT  To: Alanna Sosa LPN    I tried uploading it again let me know if you can see it  ----- Message -----  From: Alanna Sosa LPN  Sent: 6/9/2025   1:15 PM CDT  To: Silvia Sanchez    I dont see a denial letter. Jessica garcia  ----- Message -----  From: Silvia Sanchez  Sent: 6/9/2025   1:05 PM CDT  To: Aaliyah Rodriguez MD; Jennifer Fischer Staff    Good afternoon,    MRN: [id number]    Patient's procedure have been denied by insurance.  Reason for denial is stating in the denial letter that have been uploaded to the media section of the patient's chart. Please call [facility] at [phone] using case# [id number] to do the p2p. Please advise.

## 2025-06-12 ENCOUNTER — HOSPITAL ENCOUNTER (OUTPATIENT)
Dept: RADIOLOGY | Facility: HOSPITAL | Age: 55
Discharge: HOME | End: 2025-06-12
Attending: STUDENT IN AN ORGANIZED HEALTH CARE EDUCATION/TRAINING PROGRAM | Admitting: STUDENT IN AN ORGANIZED HEALTH CARE EDUCATION/TRAINING PROGRAM
Payer: COMMERCIAL

## 2025-06-12 ENCOUNTER — HOSPITAL ENCOUNTER (OUTPATIENT)
Facility: HOSPITAL | Age: 55
Discharge: HOME | End: 2025-06-12
Attending: STUDENT IN AN ORGANIZED HEALTH CARE EDUCATION/TRAINING PROGRAM | Admitting: STUDENT IN AN ORGANIZED HEALTH CARE EDUCATION/TRAINING PROGRAM
Payer: COMMERCIAL

## 2025-06-12 VITALS
SYSTOLIC BLOOD PRESSURE: 132 MMHG | BODY MASS INDEX: 50.79 KG/M2 | WEIGHT: 276 LBS | TEMPERATURE: 98 F | HEART RATE: 68 BPM | HEIGHT: 62 IN | OXYGEN SATURATION: 100 % | RESPIRATION RATE: 16 BRPM | DIASTOLIC BLOOD PRESSURE: 74 MMHG

## 2025-06-12 DIAGNOSIS — M54.50 LOWER BACK PAIN: ICD-10-CM

## 2025-06-12 DIAGNOSIS — M47.816 LUMBAR SPONDYLOSIS: ICD-10-CM

## 2025-06-12 DIAGNOSIS — M47.817 SPONDYLOSIS OF LUMBOSACRAL REGION WITHOUT MYELOPATHY OR RADICULOPATHY: ICD-10-CM

## 2025-06-12 DIAGNOSIS — L30.9 DERMATITIS: ICD-10-CM

## 2025-06-12 PROCEDURE — 64635 DESTROY LUMB/SAC FACET JNT: CPT | Mod: 50,PO | Performed by: STUDENT IN AN ORGANIZED HEALTH CARE EDUCATION/TRAINING PROGRAM

## 2025-06-12 PROCEDURE — 64635 DESTROY LUMB/SAC FACET JNT: CPT | Mod: 50,,, | Performed by: STUDENT IN AN ORGANIZED HEALTH CARE EDUCATION/TRAINING PROGRAM

## 2025-06-12 PROCEDURE — 63600175 PHARM REV CODE 636 W HCPCS: Mod: PO | Performed by: STUDENT IN AN ORGANIZED HEALTH CARE EDUCATION/TRAINING PROGRAM

## 2025-06-12 PROCEDURE — 64636 DESTROY L/S FACET JNT ADDL: CPT | Mod: 50,PO | Performed by: STUDENT IN AN ORGANIZED HEALTH CARE EDUCATION/TRAINING PROGRAM

## 2025-06-12 PROCEDURE — 64636 DESTROY L/S FACET JNT ADDL: CPT | Mod: 50,,, | Performed by: STUDENT IN AN ORGANIZED HEALTH CARE EDUCATION/TRAINING PROGRAM

## 2025-06-12 RX ORDER — TRETINOIN 0.25 MG/G
CREAM TOPICAL NIGHTLY
Qty: 45 G | Refills: 3 | Status: SHIPPED | OUTPATIENT
Start: 2025-06-12

## 2025-06-12 RX ORDER — LIDOCAINE HYDROCHLORIDE 10 MG/ML
INJECTION, SOLUTION EPIDURAL; INFILTRATION; INTRACAUDAL; PERINEURAL
Status: DISCONTINUED | OUTPATIENT
Start: 2025-06-12 | End: 2025-06-12 | Stop reason: HOSPADM

## 2025-06-12 RX ORDER — MOMETASONE FUROATE 1 MG/G
OINTMENT TOPICAL
Qty: 45 G | Refills: 0 | Status: SHIPPED | OUTPATIENT
Start: 2025-06-12

## 2025-06-12 RX ORDER — SODIUM CHLORIDE, SODIUM LACTATE, POTASSIUM CHLORIDE, CALCIUM CHLORIDE 600; 310; 30; 20 MG/100ML; MG/100ML; MG/100ML; MG/100ML
INJECTION, SOLUTION INTRAVENOUS CONTINUOUS
Status: DISCONTINUED | OUTPATIENT
Start: 2025-06-12 | End: 2025-06-12 | Stop reason: HOSPADM

## 2025-06-12 RX ORDER — BUPIVACAINE HYDROCHLORIDE 2.5 MG/ML
INJECTION, SOLUTION EPIDURAL; INFILTRATION; INTRACAUDAL; PERINEURAL
Status: DISCONTINUED | OUTPATIENT
Start: 2025-06-12 | End: 2025-06-12 | Stop reason: HOSPADM

## 2025-06-12 RX ORDER — DEXAMETHASONE SODIUM PHOSPHATE 10 MG/ML
INJECTION, SOLUTION INTRA-ARTICULAR; INTRALESIONAL; INTRAMUSCULAR; INTRAVENOUS; SOFT TISSUE
Status: DISCONTINUED | OUTPATIENT
Start: 2025-06-12 | End: 2025-06-12 | Stop reason: HOSPADM

## 2025-06-12 RX ORDER — MIDAZOLAM HYDROCHLORIDE 1 MG/ML
INJECTION INTRAMUSCULAR; INTRAVENOUS
Status: DISCONTINUED | OUTPATIENT
Start: 2025-06-12 | End: 2025-06-12 | Stop reason: HOSPADM

## 2025-06-12 RX ORDER — FENTANYL CITRATE 50 UG/ML
INJECTION, SOLUTION INTRAMUSCULAR; INTRAVENOUS
Status: DISCONTINUED | OUTPATIENT
Start: 2025-06-12 | End: 2025-06-12 | Stop reason: HOSPADM

## 2025-06-12 RX ADMIN — SODIUM CHLORIDE, POTASSIUM CHLORIDE, SODIUM LACTATE AND CALCIUM CHLORIDE: 600; 310; 30; 20 INJECTION, SOLUTION INTRAVENOUS at 07:06

## 2025-06-12 NOTE — DISCHARGE SUMMARY
[facility] - Surgery  Discharge Note  Short Stay    Procedure(s) (LRB):  Radiofrequency Ablation, Nerve, Spinal, Lumbar, Medial Branch, 1 Level l4/5 and l5/s1 RFA ( P asks for first case if possible) (Bilateral)      OUTCOME: Patient tolerated treatment/procedure well without complication and is now ready for discharge.    DISPOSITION: Home or Self Care    FINAL DIAGNOSIS:  <principal problem not specified>    FOLLOWUP: In clinic    DISCHARGE INSTRUCTIONS:    Discharge Procedure Orders   Notify your health care provider if you experience any of the following:  temperature >100.4     Notify your health care provider if you experience any of the following:  severe uncontrolled pain     Notify your health care provider if you experience any of the following:  redness, tenderness, or signs of infection (pain, swelling, redness, odor or green/yellow discharge around incision site)     Activity as tolerated        TIME SPENT ON DISCHARGE: 20 minutes

## 2025-06-12 NOTE — OP NOTE
Patient: Felicia Drew                                                    MRN: [id number]  : 1970                                              Date of procedure: 2025      Pre Procedure Diagnosis: Low back pain, Lumbago, Lumbar spondylosis without myelopathy/ lumbrosacral region    Post Procedure Diagnosis: Same    Procedure: Bilateral Lumbar Medial Branch Nerve Rhizotomy for  L4-5 and L5-S1 joints under Fluoroscopy     Attending: Aaliyah Rodriguez MD    Local Anesthetic Injected: 1% Lidocaine 10 ml    Sedation Medications: See RN note    Estimated Blood Loss: None    Complication: None      Procedure:  After informed consent was obtained, patient was taken to the fluoroscopy suite and placed in a prone position.  The skin was prepped and draped in the usual sterile fashion using chlorhexidine.  Anatomical landmarks were identified with the aid of fluoroscopy in PA and Oblique views, and the skin and subcutaneous tissue were infiltrated with a total of 5mL of 1% Lidocaine via a 25-gauge 1.5inch needle at each of the intended entry sites.  Subsequently, three 22-gauge 100mm 10mm tip introducer needles were advanced with the aid of fluoroscopy such that their tips were located at the respective positions of the superior medial border of the transverse processes with the posterior elements at each level.  Needle placement was confirmed with the aid of fluoroscopy.  Motor stimulation up to 2 Volts at each level confirmed no motor nerve involvement.  Impedance was less than 800 ohms at each level.  1ml of 2% lidocaine was instilled prior to lesioning.  Ablation was performed per level utilizing radiofrequency generator 80°C for 90 seconds. Then 0.5mL of a mixture of 0.5mL of Dexamethasone 10mg/mL and 2mL of 0.25% Bupivacaine was injected at each level.  There were no complications or paresthesias.   Patient tolerated the procedure well and all needles were removed intact.    Patient was observed in recovery  and discharged home with written instruction under supervision in stable condition.

## 2025-07-10 ENCOUNTER — OFFICE VISIT (OUTPATIENT)
Dept: PAIN MEDICINE | Facility: CLINIC | Age: 55
End: 2025-07-10
Payer: COMMERCIAL

## 2025-07-10 ENCOUNTER — TELEPHONE (OUTPATIENT)
Dept: PAIN MEDICINE | Facility: CLINIC | Age: 55
End: 2025-07-10
Payer: COMMERCIAL

## 2025-07-10 VITALS — HEIGHT: 62 IN | BODY MASS INDEX: 50.79 KG/M2 | WEIGHT: 276 LBS

## 2025-07-10 DIAGNOSIS — M46.1 SACROILIITIS: Primary | ICD-10-CM

## 2025-07-10 PROCEDURE — 99999 PR PBB SHADOW E&M-EST. PATIENT-LVL III: CPT | Mod: PBBFAC,,, | Performed by: STUDENT IN AN ORGANIZED HEALTH CARE EDUCATION/TRAINING PROGRAM

## 2025-07-10 NOTE — TELEPHONE ENCOUNTER
Types of orders made on 07/10/2025: Procedure Request      Order Date:7/10/2025   Ordering User:SHARYN VICTOR [[id number]]   Encounter Provider:Sharyn Victor MD [[id number]]   Authorizing Provider: Sharyn Victor MD [[id number]]   Department:[facility] PAIN MANAGEMENT[[id number]]      Common Order Information   Procedure -> Joint/Bursa Injection (Specify joint and laterality) Cmt: L SIJ             (Cov)      Order Specific Information   Order: Procedure Request Order for Pain Management [Custom: [id number]]  Order #:          [id number]: 1 FUTURE     Priority: Routine  Class: Clinic Performed     Future Order Information       Expires on:07/10/2026            Expected by:07/10/2025                   Associated Diagnoses       M46.1 Sacroiliitis       Facility Name: -> [facility]          Follow-up: -> 2 weeks              Priority: Routine  Class: Clinic Performed     Future Order Information       Expires on:07/10/2026            Expected by:07/10/2025                   Associated Diagnoses       M46.1 Sacroiliitis       Procedure -> Joint/Bursa Injection (Specify joint and laterality) Cmt: L                 SIJ (Cov)          Facility Name: -> [facility]

## 2025-07-10 NOTE — PROGRESS NOTES
[facility] - Department    Terrie Hartmann MD      First Office Visit: 2/12/25  Today' Date: 7/10/2025  Last Office Visit: 6/3/25    Chief complaint: back pain (L side)    HPI: Pt is a pleasant 55 y.o., who presents for evaluation. Referred by Dr. Hartmann. Pt seen previously for L sided back/buttock pain for mos. Pt seen today for follow-up from B RFA L4-5 and L5-S1. States she has had >50% improvement on ROM and stiffness. Does have residual L sided low back pain that goes down from the back to the L buttock. Occasionally has pain that goes down the front and at time the back of the L leg. States back pain stays mostly in the back and is worse with bending. Pt is interested in repeat L SIJ injection. No BB changes. Is doing HEP.         Pain disability index score: 49  Pain score: 7    Relevant Imaging/ Testing:   MR L-spine 1/25 - mild degenerative changes at L4-5  XR L-spine 10/24     Procedures:   B RFA L4-5 and L5-S1 6/12/25  B MBB L4-5 and L5-S1 5/8/25, 5/29/25  L SIJ 3/13/25    Date of board of pharmacy review:7/10/2025  Date of opioid risk screening/ pain psych: None  Date of opioid agreement and consent: None  Date of urine drug screen: None  Date of random pill count: None     was reviewed today: reviewed, no concerns     Prescribed medications: None    See EHR for  PMH, PSH, FH, SH, Medications and Allergy    ROS:  Positive for pain  ROS     PE:  There were no vitals filed for this visit.  General: Pleasant, no distress  HEENT: NC/ AT. PERRLA  CV: Radial pulses intact  Pulm: No distress  Ext: No edema    Physical Exam     Neuromusculoskeletal:  Head: NC, AT. PERRLA  Neck: Intact range of motions  Shoulder: Intact range of motion  Lumbar: Intact range of motion. Bilat Facet loading. Min Tenderness. Neg SL. No pain with flexion. Pain with extension.   Hip: Intact range of motion  SI: Level, L SIJ TTP, Pos ANETA's, gaenslens, and thigh thrust   Knee: Intact range of motion. Min Tenderness. No Swelling.  Min Crepitis  Reflexes: normal Knee  Strength: 5/5 globally   Sensory: Grossly intact   Skin: No bruising, erythema  Gait: Normal      Impression:  Back pain (L side)  Relevant History  BMI 48.83  Anxiety  H/o DVT   H/o bariatric surgery   Vit D deficiency     Assessment:  Axial back pain   L sacroiliitis  Discogenic back pain - degenerative changes L4-5     Plan:  Discussed options  Imaging/ relevant records viewed/ reviewed/ discussed  Imaging results viewed and reviewed (noted above)/ reviewed with patient   reviewed  Cont HEP  Schedule L SIJ injection - pt understands risks including bleeding, infection, damage to surrounding tissue  Re-eval after  Consider ILESI L4-5 to L if no relief from above  Consider piriformis injection   Consider viadisc L4-5       Prescribed medications:  1. None    The impression and plan were discussed and explained in detail. All the questions were answered. Education was provided accordingly.     The procedure was explained in detail, along with risks and potential side effects.    Follow-up:  For procedure     Aaliyah Rodriguez MD

## 2025-07-10 NOTE — H&P (VIEW-ONLY)
[facility] - Department    Terrie Hartmann MD      First Office Visit: 2/12/25  Today' Date: 7/10/2025  Last Office Visit: 6/3/25    Chief complaint: back pain (L side)    HPI: Pt is a pleasant 55 y.o., who presents for evaluation. Referred by Dr. Hartmann. Pt seen previously for L sided back/buttock pain for mos. Pt seen today for follow-up from B RFA L4-5 and L5-S1. States she has had >50% improvement on ROM and stiffness. Does have residual L sided low back pain that goes down from the back to the L buttock. Occasionally has pain that goes down the front and at time the back of the L leg. States back pain stays mostly in the back and is worse with bending. Pt is interested in repeat L SIJ injection. No BB changes. Is doing HEP.         Pain disability index score: 49  Pain score: 7    Relevant Imaging/ Testing:   MR L-spine 1/25 - mild degenerative changes at L4-5  XR L-spine 10/24     Procedures:   B RFA L4-5 and L5-S1 6/12/25  B MBB L4-5 and L5-S1 5/8/25, 5/29/25  L SIJ 3/13/25    Date of board of pharmacy review:7/10/2025  Date of opioid risk screening/ pain psych: None  Date of opioid agreement and consent: None  Date of urine drug screen: None  Date of random pill count: None     was reviewed today: reviewed, no concerns     Prescribed medications: None    See EHR for  PMH, PSH, FH, SH, Medications and Allergy    ROS:  Positive for pain  ROS     PE:  There were no vitals filed for this visit.  General: Pleasant, no distress  HEENT: NC/ AT. PERRLA  CV: Radial pulses intact  Pulm: No distress  Ext: No edema    Physical Exam     Neuromusculoskeletal:  Head: NC, AT. PERRLA  Neck: Intact range of motions  Shoulder: Intact range of motion  Lumbar: Intact range of motion. Bilat Facet loading. Min Tenderness. Neg SL. No pain with flexion. Pain with extension.   Hip: Intact range of motion  SI: Level, L SIJ TTP, Pos ANETA's, gaenslens, and thigh thrust   Knee: Intact range of motion. Min Tenderness. No Swelling.  Min Crepitis  Reflexes: normal Knee  Strength: 5/5 globally   Sensory: Grossly intact   Skin: No bruising, erythema  Gait: Normal      Impression:  Back pain (L side)  Relevant History  BMI 48.83  Anxiety  H/o DVT   H/o bariatric surgery   Vit D deficiency     Assessment:  Axial back pain   L sacroiliitis  Discogenic back pain - degenerative changes L4-5     Plan:  Discussed options  Imaging/ relevant records viewed/ reviewed/ discussed  Imaging results viewed and reviewed (noted above)/ reviewed with patient   reviewed  Cont HEP  Schedule L SIJ injection - pt understands risks including bleeding, infection, damage to surrounding tissue  Re-eval after  Consider ILESI L4-5 to L if no relief from above  Consider piriformis injection   Consider viadisc L4-5       Prescribed medications:  1. None    The impression and plan were discussed and explained in detail. All the questions were answered. Education was provided accordingly.     The procedure was explained in detail, along with risks and potential side effects.    Follow-up:  For procedure     Aaliyah Rodriguez MD

## 2025-07-11 DIAGNOSIS — M46.1 SACROILIITIS: Primary | ICD-10-CM

## 2025-07-11 RX ORDER — SODIUM CHLORIDE, SODIUM LACTATE, POTASSIUM CHLORIDE, CALCIUM CHLORIDE 600; 310; 30; 20 MG/100ML; MG/100ML; MG/100ML; MG/100ML
INJECTION, SOLUTION INTRAVENOUS CONTINUOUS
OUTPATIENT
Start: 2025-07-11

## 2025-07-17 ENCOUNTER — TELEPHONE (OUTPATIENT)
Dept: FAMILY MEDICINE | Facility: CLINIC | Age: 55
End: 2025-07-17

## 2025-07-17 ENCOUNTER — OFFICE VISIT (OUTPATIENT)
Dept: FAMILY MEDICINE | Facility: CLINIC | Age: 55
End: 2025-07-17
Payer: COMMERCIAL

## 2025-07-17 ENCOUNTER — LAB VISIT (OUTPATIENT)
Dept: LAB | Facility: HOSPITAL | Age: 55
End: 2025-07-17
Attending: INTERNAL MEDICINE
Payer: COMMERCIAL

## 2025-07-17 VITALS
HEIGHT: 62 IN | BODY MASS INDEX: 50.42 KG/M2 | WEIGHT: 274 LBS | HEART RATE: 68 BPM | SYSTOLIC BLOOD PRESSURE: 128 MMHG | DIASTOLIC BLOOD PRESSURE: 71 MMHG | TEMPERATURE: 98 F

## 2025-07-17 DIAGNOSIS — E53.8 VITAMIN B12 DEFICIENCY: ICD-10-CM

## 2025-07-17 DIAGNOSIS — Z13.1 SCREENING FOR DIABETES MELLITUS: ICD-10-CM

## 2025-07-17 DIAGNOSIS — Z00.00 ENCOUNTER FOR ANNUAL HEALTH EXAMINATION: ICD-10-CM

## 2025-07-17 DIAGNOSIS — E55.9 VITAMIN D DEFICIENCY: ICD-10-CM

## 2025-07-17 DIAGNOSIS — E66.01 MORBID OBESITY WITH BMI OF 50.0-59.9, ADULT: ICD-10-CM

## 2025-07-17 DIAGNOSIS — Z00.00 ENCOUNTER FOR ANNUAL HEALTH EXAMINATION: Primary | ICD-10-CM

## 2025-07-17 DIAGNOSIS — Z13.6 ENCOUNTER FOR LIPID SCREENING FOR CARDIOVASCULAR DISEASE: ICD-10-CM

## 2025-07-17 DIAGNOSIS — M54.42 CHRONIC LEFT-SIDED LOW BACK PAIN WITH LEFT-SIDED SCIATICA: ICD-10-CM

## 2025-07-17 DIAGNOSIS — G89.29 CHRONIC LEFT-SIDED LOW BACK PAIN WITH LEFT-SIDED SCIATICA: ICD-10-CM

## 2025-07-17 DIAGNOSIS — Z13.220 ENCOUNTER FOR LIPID SCREENING FOR CARDIOVASCULAR DISEASE: ICD-10-CM

## 2025-07-17 DIAGNOSIS — L30.4 INTERTRIGO: ICD-10-CM

## 2025-07-17 PROBLEM — F41.1 GAD (GENERALIZED ANXIETY DISORDER): Status: RESOLVED | Noted: 2022-06-07 | Resolved: 2025-07-17

## 2025-07-17 LAB
25(OH)D3+25(OH)D2 SERPL-MCNC: 40 NG/ML (ref 30–96)
ABSOLUTE EOSINOPHIL (OHS): 0.08 K/UL
ABSOLUTE MONOCYTE (OHS): 0.56 K/UL (ref 0.3–1)
ABSOLUTE NEUTROPHIL COUNT (OHS): 3.46 K/UL (ref 1.8–7.7)
ALBUMIN SERPL BCP-MCNC: 4 G/DL (ref 3.5–5.2)
ALP SERPL-CCNC: 92 UNIT/L (ref 40–150)
ALT SERPL W/O P-5'-P-CCNC: 16 UNIT/L (ref 10–44)
ANION GAP (OHS): 8 MMOL/L (ref 8–16)
AST SERPL-CCNC: 19 UNIT/L (ref 11–45)
BASOPHILS # BLD AUTO: 0.04 K/UL
BASOPHILS NFR BLD AUTO: 0.8 %
BILIRUB SERPL-MCNC: 0.6 MG/DL (ref 0.1–1)
BUN SERPL-MCNC: 11 MG/DL (ref 6–20)
CALCIUM SERPL-MCNC: 8.9 MG/DL (ref 8.7–10.5)
CHLORIDE SERPL-SCNC: 108 MMOL/L (ref 95–110)
CHOLEST SERPL-MCNC: 136 MG/DL (ref 120–199)
CHOLEST/HDLC SERPL: 2.2 {RATIO} (ref 2–5)
CO2 SERPL-SCNC: 26 MMOL/L (ref 23–29)
CREAT SERPL-MCNC: 0.6 MG/DL (ref 0.5–1.4)
EAG (OHS): 100 MG/DL (ref 68–131)
ERYTHROCYTE [DISTWIDTH] IN BLOOD BY AUTOMATED COUNT: 13.4 % (ref 11.5–14.5)
GFR SERPLBLD CREATININE-BSD FMLA CKD-EPI: >60 ML/MIN/1.73/M2
GLUCOSE SERPL-MCNC: 76 MG/DL (ref 70–110)
HBA1C MFR BLD: 5.1 % (ref 4–5.6)
HCT VFR BLD AUTO: 39.9 % (ref 37–48.5)
HDLC SERPL-MCNC: 61 MG/DL (ref 40–75)
HDLC SERPL: 44.9 % (ref 20–50)
HGB BLD-MCNC: 12.7 GM/DL (ref 12–16)
IMM GRANULOCYTES # BLD AUTO: 0.02 K/UL (ref 0–0.04)
IMM GRANULOCYTES NFR BLD AUTO: 0.4 % (ref 0–0.5)
LDLC SERPL CALC-MCNC: 63.8 MG/DL (ref 63–159)
LYMPHOCYTES # BLD AUTO: 0.83 K/UL (ref 1–4.8)
MCH RBC QN AUTO: 27.5 PG (ref 27–31)
MCHC RBC AUTO-ENTMCNC: 31.8 G/DL (ref 32–36)
MCV RBC AUTO: 87 FL (ref 82–98)
NONHDLC SERPL-MCNC: 75 MG/DL
NUCLEATED RBC (/100WBC) (OHS): 0 /100 WBC
PLATELET # BLD AUTO: 215 K/UL (ref 150–450)
PMV BLD AUTO: 12.4 FL (ref 9.2–12.9)
POTASSIUM SERPL-SCNC: 3.9 MMOL/L (ref 3.5–5.1)
PROT SERPL-MCNC: 6.9 GM/DL (ref 6–8.4)
RBC # BLD AUTO: 4.61 M/UL (ref 4–5.4)
RELATIVE EOSINOPHIL (OHS): 1.6 %
RELATIVE LYMPHOCYTE (OHS): 16.6 % (ref 18–48)
RELATIVE MONOCYTE (OHS): 11.2 % (ref 4–15)
RELATIVE NEUTROPHIL (OHS): 69.4 % (ref 38–73)
SODIUM SERPL-SCNC: 142 MMOL/L (ref 136–145)
TRIGL SERPL-MCNC: 56 MG/DL (ref 30–150)
WBC # BLD AUTO: 4.99 K/UL (ref 3.9–12.7)

## 2025-07-17 PROCEDURE — 82306 VITAMIN D 25 HYDROXY: CPT

## 2025-07-17 PROCEDURE — 85025 COMPLETE CBC W/AUTO DIFF WBC: CPT

## 2025-07-17 PROCEDURE — 80061 LIPID PANEL: CPT

## 2025-07-17 PROCEDURE — 80053 COMPREHEN METABOLIC PANEL: CPT

## 2025-07-17 PROCEDURE — 83036 HEMOGLOBIN GLYCOSYLATED A1C: CPT

## 2025-07-17 PROCEDURE — 36415 COLL VENOUS BLD VENIPUNCTURE: CPT | Mod: PO

## 2025-07-17 PROCEDURE — 99999 PR PBB SHADOW E&M-EST. PATIENT-LVL IV: CPT | Mod: PBBFAC,,, | Performed by: INTERNAL MEDICINE

## 2025-07-17 PROCEDURE — 99396 PREV VISIT EST AGE 40-64: CPT | Mod: S$GLB,,, | Performed by: INTERNAL MEDICINE

## 2025-07-17 RX ORDER — PREGABALIN 50 MG/1
50 CAPSULE ORAL 2 TIMES DAILY
Qty: 60 CAPSULE | Refills: 0 | Status: SHIPPED | OUTPATIENT
Start: 2025-07-17 | End: 2026-01-15

## 2025-07-17 RX ORDER — CLOTRIMAZOLE 1 %
CREAM (GRAM) TOPICAL 2 TIMES DAILY
Qty: 45 G | Refills: 0 | Status: SHIPPED | OUTPATIENT
Start: 2025-07-17

## 2025-07-17 NOTE — TELEPHONE ENCOUNTER
----- Message from Terrie Hartmann MD sent at 7/17/2025  8:24 AM CDT -----  Please see if labs can add b12 to labs patient had done this morning

## 2025-07-17 NOTE — PROGRESS NOTES
Assessment/Plan:    Patient here for annual wellness exam. Health maintenance was reviewed and ordered.    Complete history and physical was completed today.  Complete and thorough medication reconciliation was performed.  Discussed risks and benefits of medications.  Advised patient on orders and health maintenance. Continue current medications listed on your summary sheet.    All questions were answered. Patient had no further concerns. Advised of diagnoses and plan, as listed below.    1. Encounter for annual health examination  -     Hemoglobin A1c; Future; Expected date: 07/17/2025  -     Comprehensive Metabolic Panel; Standing  -     Lipid Panel; Standing  -     CBC Auto Differential; Standing    2. Chronic left-sided low back pain with left-sided sciatica  Overview:  -recently established with pain management  -receiving injections and has had some relief of symptoms with these  -upcoming SI injection scheduled  -plan to restart lyrica to help with residual pain symptoms  -tolerated medication in the past    Orders:  -     pregabalin (LYRICA) 50 MG capsule; Take 1 capsule (50 mg total) by mouth 2 (two) times daily.  Dispense: 60 capsule; Refill: 0    3. Encounter for lipid screening for cardiovascular disease  -     Lipid Panel; Standing    4. Screening for diabetes mellitus  -     Hemoglobin A1c; Future; Expected date: 07/17/2025    5. Intertrigo  -     clotrimazole (LOTRIMIN) 1 % cream; Apply topically 2 (two) times daily.  Dispense: 45 g; Refill: 0    6. Vitamin D deficiency  Overview:  -taking vitd 50k weekly  -checking vitamin D with labs    Orders:  -     Vitamin D; Future; Expected date: 07/17/2025    7. Vitamin B12 deficiency  Overview:  -remains on weekly PO B12  -checking labs    Orders:  -     Vitamin B12; Future; Expected date: 07/17/2025    8. Morbid obesity with BMI of 50.0-59.9, adult  Overview:  -established with bariatrics  -started on Semaglutide with successful weight loss but had to stop  for procedures but recently restarted  -experiencing some intermittent constipation and loose stool but denies severe AE  -continue lifestyle modification with diet and exercise          Terrie Hartmann MD       WELLNESS EXAM      Patient ID: Felicia Drew is a 55 y.o. female.  has a past medical history of ANDREA (generalized anxiety disorder) (06/07/2022), H/O hysterectomy for benign disease with BSO (02/02/2017), Morbid obesity, Varicose veins, and Vitamin D deficiency.     Chief Complaint:  Encounter for wellness exam    History of Present Illness    CHIEF COMPLAINT:  Patient presents today for follow up of back pain.    BACK PAIN:  She reports ongoing back pain management with a series of interventional treatments including SI injection, two nerve blocks, and nerve burning. She currently experiences residual pain on the left side but notes overall improvement of 50% or more compared to previous pain levels. She manages pain with Tylenol and ibuprofen. One additional IS injection is scheduled for next week. While not completely pain-free, she reports significant functional improvement. She is inquiring about possibly restarting the lyrica that she was previously taking to see if this would help relieve residual pain symptoms. She tolerated the medication in the past when she was taking it.    WEIGHT MANAGEMENT:  She reports recent weight gain of approximately 10 lbs, attributed to holiday and vacation periods, as well as having to stop her Semaglutide due to multiple procedures. Current weight is 274 lbs, down from 276 lbs. She previously achieved weight loss success, reaching 252 lbs in October. She resumed Wegovy last week after a temporary pause, with potential future dose adjustment to 2.4 mg if weight loss plateaus.    SKIN CONCERNS:  She reports a persistent skin spot on right breast present for over 6 weeks. The spot has variable characteristics including intermittent redness, temperature changes,  and color variations - sometimes appearing darker, sometimes lighter. The area sometimes feels hot but is not painful. She tried Lamisil cream, prescribed by her GYN, topically for approximately two weeks without improvement. She denies itching or significant discomfort. The spot is unilateral. No associated systemic symptoms reported. She has scheduled a dermatology appointment next week.      No other new complaints today.  Remaining chronic conditions have been reviewed and remain stable. Further detail as stated above.      Health Maintenance reviewed - Annual labs ordered.     Health Maintenance Topics with due status: Not Due       Topic Last Completion Date    TETANUS VACCINE 2019    Lipid Panel 2024    Colorectal Cancer Screening 2024    Mammogram 2024    Influenza Vaccine 2025    RSV Vaccine (Age 60+ and Pregnant patients) Not Due        Past Medical History:  Past Medical History:   Diagnosis Date    ANDREA (generalized anxiety disorder) 2022    -discussed anxiety condition course  -discussed SSRI/SNRI as first-line treatment for this condition  -started on lexapro 5 mg daily  -has had improvement in anxiety symptoms but having fatigue and headache daily since starting  -discussed switching to night time dosing for the next few nights; if no improvement, plan to switch to zoloft 25 mg QHS  -discussed risk of discontinuing this medication     H/O hysterectomy for benign disease with BSO 2017    Morbid obesity     Varicose veins     Vitamin D deficiency      Past Surgical History:   Procedure Laterality Date     SECTION      CHOLECYSTECTOMY      COLONOSCOPY N/A 2020    Procedure: COLONOSCOPY;  Surgeon: Sean Ward MD;  Location: [address];  Service: Endoscopy;  Laterality: N/A;  screening colonooscpy, 49 years old.  siblings with colon polyps   - pt updated on visitor policy and drop off location.  COVID screening ordered for  at  [facility] - [facility]    COLONOSCOPY N/A 4/11/2024    Procedure: COLONOSCOPY;  Surgeon: Jhoan Mcdonald MD;  Location: [facility] ENDO;  Service: Gastroenterology;  Laterality: N/A;    ESOPHAGOGASTRODUODENOSCOPY N/A 06/18/2020    Procedure: ESOPHAGOGASTRODUODENOSCOPY (EGD);  Surgeon: Sean Ward MD;  Location: [facility] ENDO ([address] [place] [address]);  Service: Endoscopy;  Laterality: N/A;  BMI 51.77  Re-scheduled per md recommendations-new instructions mailed-tb    GASTRIC BYPASS      ZACK FILTER PLACEMENT      zack filter removal      HYSTERECTOMY      DANNA with BSO;    INJECTION OF ANESTHETIC AGENT AROUND MEDIAL BRANCH NERVES INNERVATING LUMBAR FACET JOINT Bilateral 5/8/2025    Procedure: Block-nerve-medial branch-lumbar l4/5 and l5/s1 MBB ( Iv sed);  Surgeon: Aaliyah Rodriguez MD;  Location: [facility] OR;  Service: Anesthesiology;  Laterality: Bilateral;    INJECTION OF ANESTHETIC AGENT AROUND MEDIAL BRANCH NERVES INNERVATING LUMBAR FACET JOINT Bilateral 5/29/2025    Procedure: Block-nerve-medial branch-lumbar l4/5 and l5/s1 MBB #2;  Surgeon: Aaliyah Rodriguez MD;  Location: [facility] OR;  Service: Pain Management;  Laterality: Bilateral;    INJECTION, SACROILIAC JOINT Left 3/13/2025    Procedure: INJECTION,SACROILIAC JOINT ( oral sedation);  Surgeon: Aaliyah Rodriguez MD;  Location: [facility] OR;  Service: Pain Management;  Laterality: Left;    lap band removal       LAPAROSCOPIC GASTRIC BANDING      OOPHORECTOMY      w/hysteretomy @ age 41    RADIOFREQUENCY ABLATION OF LUMBAR MEDIAL BRANCH NERVE AT SINGLE LEVEL Bilateral 6/12/2025    Procedure: Radiofrequency Ablation, Nerve, Spinal, Lumbar, Medial Branch, 1 Level l4/5 and l5/s1 RFA ( P asks for first case if possible);  Surgeon: Aaliyah Rodriguez MD;  Location: [facility] OR;  Service: Pain Management;  Laterality: Bilateral;    TONSILLECTOMY      VASCULAR SURGERY       Review of patient's allergies indicates:   Allergen Reactions    Brompheniramin-phenylephrin-dm      Other reaction(s): Other (See  Comments)  Per patient, caused dizziness + blurry vision     Erythromycin Rash    Erythromycin (bulk) Rash     Medications Ordered Prior to Encounter[1]  Social History[2]  Family History   Problem Relation Name Age of Onset    Aortic aneurysm Mother Pat     Diabetes Mother Pat     Lung cancer Father Srinivas 70 - 79        quit tobacco earlier    Hyperlipidemia Sister Anuja     Hypertension Sister Anuja     BAYLEE disease Sister Anuja     Rheum arthritis Sister Anuja     Aortic aneurysm Sister Anuja     Melanoma Brothemmy Pizarro     Stomach cancer Brothemmy Pizarro 69        stomach, liver, esophageal    COPD Brother Juarez         emphysema; quit tobacco;    Drug abuse Brothemmy Pizarro     Aortic aneurysm Brothemmy Pizarro     Dementia Brothemmy Pizarro         hx of hypoxia s/p aspiration PNA    Aortic aneurysm Brother Harshad         had repaired    Drug abuse Brother Harshad     Pancreatic cancer Maternal Grandfather  60 - 69    Stomach cancer Paternal Uncle  30 - 39    Stomach cancer Maternal Aunt  69       Review of Systems   Constitutional:  Negative for chills, fever and malaise/fatigue.   HENT:  Negative for congestion and sore throat.    Eyes:  Negative for blurred vision and double vision.   Respiratory:  Negative for cough and shortness of breath.    Cardiovascular:  Negative for chest pain, palpitations and leg swelling.   Gastrointestinal:  Negative for abdominal pain, constipation and diarrhea.   Genitourinary:  Negative for dysuria and frequency.   Musculoskeletal:  Positive for back pain. Negative for joint pain.   Neurological:  Negative for headaches.   Psychiatric/Behavioral:  Negative for depression. The patient is not nervous/anxious.            Objective:      Vitals:    07/17/25 0741   BP: 128/71   Pulse: 68   Temp: 97.5 °F (36.4 °C)     Body mass index is 50.12 kg/m².     Physical Exam  Constitutional:       General: She is not in acute distress.     Appearance: Normal appearance. She is well-developed. She  is obese.   HENT:      Head: Normocephalic and atraumatic.   Eyes:      Conjunctiva/sclera: Conjunctivae normal.   Cardiovascular:      Rate and Rhythm: Normal rate and regular rhythm.      Pulses: Normal pulses.      Heart sounds: Normal heart sounds. No murmur heard.  Pulmonary:      Effort: Pulmonary effort is normal. No respiratory distress.      Breath sounds: Normal breath sounds.   Abdominal:      General: Bowel sounds are normal. There is no distension.      Palpations: Abdomen is soft.      Tenderness: There is no abdominal tenderness.   Musculoskeletal:         General: Normal range of motion.      Cervical back: Normal range of motion and neck supple.   Skin:     General: Skin is warm and dry.      Findings: No rash.   Neurological:      General: No focal deficit present.      Mental Status: She is alert and oriented to person, place, and time.   Psychiatric:         Mood and Affect: Mood normal.         Behavior: Behavior normal.             All labs, imaging and procedures performed since last visit have been personally reviewed.    This note was generated with the assistance of ambient listening technology. Verbal consent was obtained by the patient and accompanying visitor(s) for the recording of patient appointment to facilitate this note. I attest to having reviewed and edited the generated note for accuracy, though some syntax or spelling errors may persist. Please contact the author of this note for any clarification.         [1]   Current Outpatient Medications on File Prior to Visit   Medication Sig Dispense Refill    acetaminophen (TYLENOL) 500 MG tablet Take 1 tablet (500 mg total) by mouth every 6 (six) hours as needed for Pain.  0    cholecalciferol, vitamin D3, 1,250 mcg (50,000 unit) capsule Take 1 capsule (50,000 Units total) by mouth every 7 days. 12 capsule 3    cyanocobalamin, vitamin B-12, (VITAMIN B-12) 2,500 mcg Subl Place 1 tablet under the tongue once a week. 90 each 3    ferrous  sulfate 325 (65 FE) MG EC tablet Take 1 tablet (325 mg total) by mouth once daily. 90 tablet 3    mometasone (ELOCON) 0.1 % ointment APPLY TOPICALLY TO AFFECTED AREA EVERY DAY 45 g 0    multivitamin capsule Take 3 capsules by mouth 3 (three) times daily.      nystatin (NYSTOP) powder Apply topically 3 (three) times daily. Apply to affected area 1 Bottle 3    omeprazole (PRILOSEC) 40 MG capsule TAKE 1 CAPSULE (40 MG TOTAL) BY MOUTH EVERY MORNING. 90 capsule 3    ondansetron (ZOFRAN-ODT) 4 MG TbDL Take 2 tablets (8 mg total) by mouth every 8 (eight) hours as needed (nausea). 30 tablet 0    semaglutide, weight loss, (WEGOVY) 1.7 mg/0.75 mL PnIj INJECT 1.7 MG INTO THE SKIN EVERY 7 DAYS. 3 mL 2    tacrolimus (PROTOPIC) 0.1 % ointment APPLY TOPICALLY 2 (TWO) TIMES DAILY 60 g 1    traMADoL (ULTRAM) 50 mg tablet Take 1 tablet (50 mg total) by mouth every 6 (six) hours as needed for Pain. 30 tablet 0    tretinoin (RETIN-A) 0.025 % cream APPLY TOPICALLY EVERY EVENING. 45 g 3    triamcinolone acetonide 0.1% (KENALOG) 0.1 % cream APPLY TOPICALLY TWICE A DAY 30 g 0    [DISCONTINUED] pregabalin (LYRICA) 50 MG capsule Take 1 capsule (50 mg total) by mouth 2 (two) times daily. (Patient not taking: Reported on 2025) 60 capsule 0    [DISCONTINUED] terbinafine HCL (LAMISIL) 1 % cream Apply topically nightly. (Patient not taking: Reported on 2025) 15 g 0     No current facility-administered medications on file prior to visit.   [2]   Social History  Socioeconomic History    Marital status:     Number of children: 2   Tobacco Use    Smoking status: Former     Current packs/day: 0.00     Types: Cigarettes     Start date: 1995     Quit date:      Years since quittin.0    Smokeless tobacco: Never   Substance and Sexual Activity    Alcohol use: Yes     Alcohol/week: 1.0 standard drink of alcohol     Types: 1 Glasses of wine per week     Comment: socially    Drug use: No    Sexual activity: Yes     Partners:  Male     Birth control/protection: Other-see comments     Comment: Hysterectomy     Social Drivers of Health     Financial Resource Strain: Low Risk  (2/8/2024)    Overall Financial Resource Strain (CARDIA)     Difficulty of Paying Living Expenses: Not very hard   Food Insecurity: No Food Insecurity (2/8/2024)    Hunger Vital Sign     Worried About Running Out of Food in the Last Year: Never true     Ran Out of Food in the Last Year: Never true   Transportation Needs: No Transportation Needs (2/8/2024)    PRAPARE - Transportation     Lack of Transportation (Medical): No     Lack of Transportation (Non-Medical): No   Physical Activity: Insufficiently Active (2/8/2024)    Exercise Vital Sign     Days of Exercise per Week: 2 days     Minutes of Exercise per Session: 30 min   Stress: Stress Concern Present (2/8/2024)    [employer] - Occupational Stress Questionnaire     Feeling of Stress : To some extent   Housing Stability: High Risk (2/8/2024)    Housing Stability Vital Sign     Unable to Pay for Housing in the Last Year: Yes     Number of Places Lived in the Last Year: 1     Unstable Housing in the Last Year: No

## 2025-07-23 ENCOUNTER — OFFICE VISIT (OUTPATIENT)
Dept: DERMATOLOGY | Facility: CLINIC | Age: 55
End: 2025-07-23
Payer: COMMERCIAL

## 2025-07-23 DIAGNOSIS — L30.9 DERMATITIS: Primary | ICD-10-CM

## 2025-07-23 PROCEDURE — 99999 PR PBB SHADOW E&M-EST. PATIENT-LVL II: CPT | Mod: PBBFAC,,, | Performed by: STUDENT IN AN ORGANIZED HEALTH CARE EDUCATION/TRAINING PROGRAM

## 2025-07-23 PROCEDURE — 99214 OFFICE O/P EST MOD 30 MIN: CPT | Mod: S$GLB,,, | Performed by: STUDENT IN AN ORGANIZED HEALTH CARE EDUCATION/TRAINING PROGRAM

## 2025-07-23 PROCEDURE — G2211 COMPLEX E/M VISIT ADD ON: HCPCS | Mod: S$GLB,,, | Performed by: STUDENT IN AN ORGANIZED HEALTH CARE EDUCATION/TRAINING PROGRAM

## 2025-07-23 RX ORDER — AMMONIUM LACTATE 12 G/100G
CREAM TOPICAL
Qty: 140 G | Refills: 2 | Status: SHIPPED | OUTPATIENT
Start: 2025-07-23

## 2025-07-23 NOTE — PROGRESS NOTES
Subjective:       Patient ID:  Felicia Drew is a 55 y.o. female who presents for   Chief Complaint   Patient presents with    Rash     On right breast for about a month and a half.     History of Present Illness: The patient presents for follow up of a rash on the back, last seen on 2/13/25, started on mometasone and reports that the rash on the back is doing better. Still with occasional flares, but overall well controlled. Main concern today is a new red patch over the right breast that developed several weeks ago. Reports a pink and reddish circular patch on the skin. Has been treated with several topical antifungals with minimal improvement. Sometimes gets redder and darker, but denies any flaking, itching or pain with the area.       Rash        Review of Systems   Constitutional:  Negative for fever and chills.   Skin:  Positive for rash.        Objective:    Physical Exam   Constitutional: She appears well-developed and well-nourished. No distress.   Neurological: She is alert and oriented to person, place, and time. She is not disoriented.   Psychiatric: She has a normal mood and affect.   Skin:   Areas Examined (abnormalities noted in diagram):   Head / Face Inspection Performed  Neck Inspection Performed  Chest / Axilla Inspection Performed             Diagram Legend     Erythematous scaling macule/papule c/w actinic keratosis       Vascular papule c/w angioma      Pigmented verrucoid papule/plaque c/w seborrheic keratosis      Yellow umbilicated papule c/w sebaceous hyperplasia      Irregularly shaped tan macule c/w lentigo     1-2 mm smooth white papules consistent with Milia      Movable subcutaneous cyst with punctum c/w epidermal inclusion cyst      Subcutaneous movable cyst c/w pilar cyst      Firm pink to brown papule c/w dermatofibroma      Pedunculated fleshy papule(s) c/w skin tag(s)      Evenly pigmented macule c/w junctional nevus     Mildly variegated pigmented, slightly  irregular-bordered macule c/w mildly atypical nevus      Flesh colored to evenly pigmented papule c/w intradermal nevus       Pink pearly papule/plaque c/w basal cell carcinoma      Erythematous hyperkeratotic cursted plaque c/w SCC      Surgical scar with no sign of skin cancer recurrence      Open and closed comedones      Inflammatory papules and pustules      Verrucoid papule consistent consistent with wart     Erythematous eczematous patches and plaques     Dystrophic onycholytic nail with subungual debris c/w onychomycosis     Umbilicated papule    Erythematous-base heme-crusted tan verrucoid plaque consistent with inflamed seborrheic keratosis     Erythematous Silvery Scaling Plaque c/w Psoriasis     See annotation      Assessment / Plan:        Dermatitis - lesion on the right breast. Will try a course of ammonium lacate; if no improvement at follow-up visit, will biopsy.   -     ammonium lactate 12 % Crea; Apply topically to the affected areas 2 times daily.  Dispense: 140 g; Refill: 2         Follow up in about 8 weeks (around 9/17/2025).

## 2025-07-24 ENCOUNTER — HOSPITAL ENCOUNTER (OUTPATIENT)
Facility: HOSPITAL | Age: 55
Discharge: HOME | End: 2025-07-24
Attending: STUDENT IN AN ORGANIZED HEALTH CARE EDUCATION/TRAINING PROGRAM | Admitting: STUDENT IN AN ORGANIZED HEALTH CARE EDUCATION/TRAINING PROGRAM
Payer: COMMERCIAL

## 2025-07-24 ENCOUNTER — HOSPITAL ENCOUNTER (OUTPATIENT)
Dept: RADIOLOGY | Facility: HOSPITAL | Age: 55
Discharge: HOME | End: 2025-07-24
Attending: STUDENT IN AN ORGANIZED HEALTH CARE EDUCATION/TRAINING PROGRAM | Admitting: STUDENT IN AN ORGANIZED HEALTH CARE EDUCATION/TRAINING PROGRAM
Payer: COMMERCIAL

## 2025-07-24 DIAGNOSIS — M46.1 SACROILIITIS: ICD-10-CM

## 2025-07-24 DIAGNOSIS — M54.50 LOWER BACK PAIN: ICD-10-CM

## 2025-07-24 PROCEDURE — 25000003 PHARM REV CODE 250: Mod: PO | Performed by: STUDENT IN AN ORGANIZED HEALTH CARE EDUCATION/TRAINING PROGRAM

## 2025-07-24 PROCEDURE — 27096 INJECT SACROILIAC JOINT: CPT | Mod: LT,,, | Performed by: STUDENT IN AN ORGANIZED HEALTH CARE EDUCATION/TRAINING PROGRAM

## 2025-07-24 PROCEDURE — 27096 INJECT SACROILIAC JOINT: CPT | Mod: PO,LT | Performed by: STUDENT IN AN ORGANIZED HEALTH CARE EDUCATION/TRAINING PROGRAM

## 2025-07-24 PROCEDURE — 63600175 PHARM REV CODE 636 W HCPCS: Mod: PO | Performed by: STUDENT IN AN ORGANIZED HEALTH CARE EDUCATION/TRAINING PROGRAM

## 2025-07-24 PROCEDURE — 25500020 PHARM REV CODE 255: Mod: PO | Performed by: STUDENT IN AN ORGANIZED HEALTH CARE EDUCATION/TRAINING PROGRAM

## 2025-07-24 RX ORDER — SODIUM CHLORIDE 9 MG/ML
INJECTION, SOLUTION INTRAVENOUS CONTINUOUS
Status: DISCONTINUED | OUTPATIENT
Start: 2025-07-24 | End: 2025-07-24 | Stop reason: HOSPADM

## 2025-07-24 RX ORDER — LIDOCAINE HYDROCHLORIDE 10 MG/ML
INJECTION, SOLUTION EPIDURAL; INFILTRATION; INTRACAUDAL; PERINEURAL
Status: DISCONTINUED | OUTPATIENT
Start: 2025-07-24 | End: 2025-07-24 | Stop reason: HOSPADM

## 2025-07-24 RX ORDER — BUPIVACAINE HYDROCHLORIDE 2.5 MG/ML
INJECTION, SOLUTION EPIDURAL; INFILTRATION; INTRACAUDAL; PERINEURAL
Status: DISCONTINUED | OUTPATIENT
Start: 2025-07-24 | End: 2025-07-24 | Stop reason: HOSPADM

## 2025-07-24 RX ORDER — SODIUM CHLORIDE, SODIUM LACTATE, POTASSIUM CHLORIDE, CALCIUM CHLORIDE 600; 310; 30; 20 MG/100ML; MG/100ML; MG/100ML; MG/100ML
INJECTION, SOLUTION INTRAVENOUS CONTINUOUS
Status: DISCONTINUED | OUTPATIENT
Start: 2025-07-24 | End: 2025-07-24 | Stop reason: HOSPADM

## 2025-07-24 RX ORDER — DEXAMETHASONE SODIUM PHOSPHATE 10 MG/ML
INJECTION, SOLUTION INTRA-ARTICULAR; INTRALESIONAL; INTRAMUSCULAR; INTRAVENOUS; SOFT TISSUE
Status: DISCONTINUED | OUTPATIENT
Start: 2025-07-24 | End: 2025-07-24 | Stop reason: HOSPADM

## 2025-07-24 RX ORDER — MIDAZOLAM HYDROCHLORIDE 1 MG/ML
INJECTION INTRAMUSCULAR; INTRAVENOUS
Status: DISCONTINUED | OUTPATIENT
Start: 2025-07-24 | End: 2025-07-24 | Stop reason: HOSPADM

## 2025-07-24 RX ADMIN — SODIUM CHLORIDE: 9 INJECTION, SOLUTION INTRAVENOUS at 08:07

## 2025-07-24 NOTE — DISCHARGE SUMMARY
[facility] - Surgery  Discharge Note  Short Stay    Procedure(s) (LRB):  INJECTION,SACROILIAC JOINT (Left)      OUTCOME: Patient tolerated treatment/procedure well without complication and is now ready for discharge.    DISPOSITION: Home or Self Care    FINAL DIAGNOSIS:  <principal problem not specified>    FOLLOWUP: In clinic    DISCHARGE INSTRUCTIONS:    Discharge Procedure Orders   Notify your health care provider if you experience any of the following:  temperature >100.4     Notify your health care provider if you experience any of the following:  severe uncontrolled pain     Notify your health care provider if you experience any of the following:  redness, tenderness, or signs of infection (pain, swelling, redness, odor or green/yellow discharge around incision site)     Activity as tolerated        TIME SPENT ON DISCHARGE: 20 minutes

## 2025-07-24 NOTE — OP NOTE
Patient: Felicia Drew                                                    MRN: [id number]  : 1970                                              Date of procedure: 2025      Pre Procedure Diagnosis: Sacroilitis     Post Procedure Diagnosis: Same     Procedure: Left Sacroiliac Joint Injection under Fluoroscopy      Attending: Aaliyah Rodriguez MD     Local Anesthetic Injected: Lidocaine 1% 3ml    Sedation Medications: See RN note     Estimated Blood Loss: None     Complication: None           Procedure:  After informed consent was obtained, patient was taken to the fluoroscopy suite and placed in a prone position.  The skin was prepped and draped in the usual sterile fashion using chlorhexidine.  Anatomical landmarks were identified with the aid of fluoroscopy, and the skin and subcutaneous tissue overlying the joint was infiltrated with 3mL of 1% Lidocaine via a 25-gauge 1.5inch needle.  A 22-gauge 3.5inch needle was advanced with the aid of fluoroscopy into the joint.  Needle placement was confirmed with fluoroscopy in PA and Lateral views.  After negative aspiration, 0.5 mL of contrast was injected, delineating the joint. Then, after negative aspiration, 2 mL of 0.25% bupivicaine with 0.5 mL of 10mg/ml of dexamethasone was injected into SI joint space.  Patient tolerated the procedure well and all needles were removed intact.  There were no complications.     Patient was observed in recovery and discharged home with written instruction under supervision in stable condition.

## 2025-07-25 VITALS
HEIGHT: 62 IN | BODY MASS INDEX: 50.42 KG/M2 | OXYGEN SATURATION: 98 % | SYSTOLIC BLOOD PRESSURE: 136 MMHG | TEMPERATURE: 97 F | DIASTOLIC BLOOD PRESSURE: 78 MMHG | WEIGHT: 274 LBS | HEART RATE: 68 BPM | RESPIRATION RATE: 18 BRPM

## 2025-08-06 ENCOUNTER — PATIENT MESSAGE (OUTPATIENT)
Dept: BARIATRICS | Facility: CLINIC | Age: 55
End: 2025-08-06
Payer: COMMERCIAL

## 2025-08-06 ENCOUNTER — TELEPHONE (OUTPATIENT)
Dept: BARIATRICS | Facility: CLINIC | Age: 55
End: 2025-08-06
Payer: COMMERCIAL

## 2025-08-06 NOTE — PROGRESS NOTES
Prior [facility] was [facility] and [facility]. Sent portal message to patient.

## 2025-08-07 ENCOUNTER — OFFICE VISIT (OUTPATIENT)
Dept: PAIN MEDICINE | Facility: CLINIC | Age: 55
End: 2025-08-07
Payer: COMMERCIAL

## 2025-08-07 VITALS — WEIGHT: 274 LBS | HEIGHT: 62 IN | BODY MASS INDEX: 50.42 KG/M2

## 2025-08-07 DIAGNOSIS — M46.1 SACROILIITIS: Primary | ICD-10-CM

## 2025-08-07 PROCEDURE — 99999 PR PBB SHADOW E&M-EST. PATIENT-LVL III: CPT | Mod: PBBFAC,,, | Performed by: STUDENT IN AN ORGANIZED HEALTH CARE EDUCATION/TRAINING PROGRAM

## 2025-08-07 NOTE — PROGRESS NOTES
[facility] - Department    Terrie Hartmann MD      First Office Visit: 2/12/25  Today' Date: 8/7/2025  Last Office Visit: 7/10/25    Chief complaint: back pain (L side)    HPI: Pt is a pleasant 55 y.o., who presents for evaluation. Referred by Dr. Hartmann. Pt seen previously for L sided back/buttock pain for mos. Pt seen today for follow-up from L SIJ injection. Has done well with SIJ injection and states her pain is significantly improved. Previously endorsed L sided back pain that goes down the back to the L buttock. No BB changes. Is doing HEP.         Pain disability index score: 49  Pain score: 7    Relevant Imaging/ Testing:   MR L-spine 1/25 - mild degenerative changes at L4-5  XR L-spine 10/24     Procedures:   B RFA L4-5 and L5-S1 6/12/25  B MBB L4-5 and L5-S1 5/8/25, 5/29/25  L SIJ 3/13/25, 7/24/25 - >70% relief    Date of board of pharmacy review:8/7/2025  Date of opioid risk screening/ pain psych: None  Date of opioid agreement and consent: None  Date of urine drug screen: None  Date of random pill count: None     was reviewed today: reviewed, no concerns     Prescribed medications: None    See EHR for  PMH, PSH, FH, SH, Medications and Allergy    ROS:  Positive for pain  ROS     PE:  There were no vitals filed for this visit.  General: Pleasant, no distress  HEENT: NC/ AT. PERRLA  CV: Radial pulses intact  Pulm: No distress  Ext: No edema    Physical Exam     Neuromusculoskeletal:  Head: NC, AT. PERRLA  Neck: Intact range of motions  Shoulder: Intact range of motion  Lumbar: Intact range of motion. Bilat Facet loading. Min Tenderness. Neg SL. No pain with flexion. Pain with extension.   Hip: Intact range of motion  SI: Level, L SIJ TTP, Pos ANETA's, gaenslens, and thigh thrust   Knee: Intact range of motion. Min Tenderness. No Swelling. Min Crepitis  Reflexes: normal Knee  Strength: 5/5 globally   Sensory: Grossly intact   Skin: No bruising, erythema  Gait: Normal      Impression:  Back pain (L  side)  Relevant History  BMI 48.83  Anxiety  H/o DVT   H/o bariatric surgery   Vit D deficiency     Assessment:  L sacroiliitis  Axial back pain   Discogenic back pain - degenerative changes L4-5     Plan:  Discussed options  Imaging/ relevant records viewed/ reviewed/ discussed  Imaging results viewed and reviewed (noted above)/ reviewed with patient   reviewed  Cont HEP  Continue to monitor relief from L SIJ injection - pt is doing well overall and states she will continue to work on stretching, exercising and weight loss  B RFA L4-5 and L5-S1 prn axial back pain  Consider viadisc L4-5 for discogenic back pain       Prescribed medications:  1. None    The impression and plan were discussed and explained in detail. All the questions were answered. Education was provided accordingly.       Follow-up:  Pt to schedule     Aaliyah Rodriguez MD

## (undated) DEVICE — NDL SPINAL 25GX3.5 SPINOCAN

## (undated) DEVICE — TRAY NERVE BLOCK

## (undated) DEVICE — TOWEL OR DISP STRL BLUE 4/PK

## (undated) DEVICE — GLOVE SENSICARE PI MICRO 6

## (undated) DEVICE — MARKER SKIN RULER STERILE

## (undated) DEVICE — NDL SPINAL SPINOCAN 22GX3.5

## (undated) DEVICE — CANNULA VENOM 20G 10X100MM

## (undated) DEVICE — PAD ELECTROSURGICAL PAT PLATE